# Patient Record
Sex: MALE | Race: BLACK OR AFRICAN AMERICAN | NOT HISPANIC OR LATINO | Employment: UNEMPLOYED | URBAN - METROPOLITAN AREA
[De-identification: names, ages, dates, MRNs, and addresses within clinical notes are randomized per-mention and may not be internally consistent; named-entity substitution may affect disease eponyms.]

---

## 2024-10-19 ENCOUNTER — HOSPITAL ENCOUNTER (INPATIENT)
Facility: HOSPITAL | Age: 24
LOS: 6 days | Discharge: HOME | DRG: 698 | End: 2024-10-25
Attending: EMERGENCY MEDICINE | Admitting: INTERNAL MEDICINE
Payer: MEDICAID

## 2024-10-19 DIAGNOSIS — M32.14 SLE GLOMERULONEPHRITIS SYNDROME, WHO CLASS III: ICD-10-CM

## 2024-10-19 DIAGNOSIS — D64.9 SYMPTOMATIC ANEMIA: ICD-10-CM

## 2024-10-19 DIAGNOSIS — R06.02 SHORTNESS OF BREATH: ICD-10-CM

## 2024-10-19 DIAGNOSIS — N17.9 ACUTE RENAL FAILURE, UNSPECIFIED ACUTE RENAL FAILURE TYPE: ICD-10-CM

## 2024-10-19 DIAGNOSIS — D64.9 ANEMIA, UNSPECIFIED TYPE: Primary | ICD-10-CM

## 2024-10-19 DIAGNOSIS — E87.29 HYPERCHLOREMIC METABOLIC ACIDOSIS: ICD-10-CM

## 2024-10-19 DIAGNOSIS — I31.39 PERICARDIAL EFFUSION: ICD-10-CM

## 2024-10-19 DIAGNOSIS — R60.0 PEDAL EDEMA: ICD-10-CM

## 2024-10-19 DIAGNOSIS — E83.51 HYPOCALCEMIA: ICD-10-CM

## 2024-10-19 DIAGNOSIS — R07.9 CHEST PAIN: ICD-10-CM

## 2024-10-19 DIAGNOSIS — N04.9 NEPHROTIC SYNDROME: ICD-10-CM

## 2024-10-19 PROBLEM — E88.09 HYPOALBUMINEMIA: Status: ACTIVE | Noted: 2024-10-19

## 2024-10-19 PROBLEM — J18.9 PNEUMONIA: Status: ACTIVE | Noted: 2024-10-19

## 2024-10-19 PROBLEM — I10 HYPERTENSION: Status: ACTIVE | Noted: 2024-10-19

## 2024-10-19 PROBLEM — J90 PLEURAL EFFUSION: Status: ACTIVE | Noted: 2024-10-19

## 2024-10-19 LAB
ABO + RH BLD: NORMAL
ALBUMIN SERPL BCP-MCNC: 0.7 G/DL (ref 3.5–5.2)
ALP SERPL-CCNC: 77 U/L (ref 40–150)
ALT SERPL W/O P-5'-P-CCNC: 5 U/L (ref 10–44)
AMPHET+METHAMPHET UR QL: NEGATIVE
ANION GAP SERPL CALC-SCNC: 6 MMOL/L (ref 8–16)
ANISOCYTOSIS BLD QL SMEAR: SLIGHT
APTT PPP: 27.6 SEC (ref 21–32)
AST SERPL-CCNC: 13 U/L (ref 10–40)
BACTERIA #/AREA URNS HPF: ABNORMAL /HPF
BARBITURATES UR QL SCN>200 NG/ML: NEGATIVE
BASOPHILS # BLD AUTO: 0 K/UL (ref 0–0.2)
BASOPHILS NFR BLD: 0 % (ref 0–1.9)
BENZODIAZ UR QL SCN>200 NG/ML: NEGATIVE
BILIRUB SERPL-MCNC: 0.1 MG/DL (ref 0.1–1)
BILIRUB UR QL STRIP: NEGATIVE
BLD GP AB SCN CELLS X3 SERPL QL: NORMAL
BNP SERPL-MCNC: 14 PG/ML (ref 0–99)
BUN SERPL-MCNC: 26 MG/DL (ref 6–20)
BZE UR QL SCN: NEGATIVE
CALCIUM SERPL-MCNC: 6.9 MG/DL (ref 8.7–10.5)
CANNABINOIDS UR QL SCN: NEGATIVE
CHLORIDE SERPL-SCNC: 113 MMOL/L (ref 95–110)
CK SERPL-CCNC: 130 U/L (ref 20–200)
CLARITY UR: CLEAR
CO2 SERPL-SCNC: 17 MMOL/L (ref 23–29)
COLOR UR: YELLOW
CREAT SERPL-MCNC: 2.7 MG/DL (ref 0.5–1.4)
CREAT UR-MCNC: 128.2 MG/DL (ref 23–375)
CREAT UR-MCNC: 128.2 MG/DL (ref 23–375)
CRP SERPL-MCNC: 1.8 MG/L (ref 0–8.2)
DACRYOCYTES BLD QL SMEAR: ABNORMAL
DIFFERENTIAL METHOD BLD: ABNORMAL
EOSINOPHIL # BLD AUTO: 0.1 K/UL (ref 0–0.5)
EOSINOPHIL NFR BLD: 1.2 % (ref 0–8)
ERYTHROCYTE [DISTWIDTH] IN BLOOD BY AUTOMATED COUNT: 14.3 % (ref 11.5–14.5)
ERYTHROCYTE [SEDIMENTATION RATE] IN BLOOD BY WESTERGREN METHOD: 142 MM/HR (ref 0–10)
EST. GFR  (NO RACE VARIABLE): 32 ML/MIN/1.73 M^2
FERRITIN SERPL-MCNC: 1054 NG/ML (ref 20–300)
GLUCOSE SERPL-MCNC: 74 MG/DL (ref 70–110)
GLUCOSE UR QL STRIP: NEGATIVE
HCT VFR BLD AUTO: 12.1 % (ref 40–54)
HCV AB SERPL QL IA: NEGATIVE
HEP C VIRUS HOLD SPECIMEN: NORMAL
HGB BLD-MCNC: 4 G/DL (ref 14–18)
HGB UR QL STRIP: ABNORMAL
HIV 1+2 AB+HIV1 P24 AG SERPL QL IA: NEGATIVE
HYALINE CASTS #/AREA URNS LPF: 30 /LPF
IMM GRANULOCYTES # BLD AUTO: 0.06 K/UL (ref 0–0.04)
IMM GRANULOCYTES NFR BLD AUTO: 1 % (ref 0–0.5)
INR PPP: 0.9 (ref 0.8–1.2)
KETONES UR QL STRIP: NEGATIVE
LDH SERPL L TO P-CCNC: 260 U/L (ref 110–260)
LEUKOCYTE ESTERASE UR QL STRIP: NEGATIVE
LYMPHOCYTES # BLD AUTO: 1.5 K/UL (ref 1–4.8)
LYMPHOCYTES NFR BLD: 25.5 % (ref 18–48)
MCH RBC QN AUTO: 26.8 PG (ref 27–31)
MCHC RBC AUTO-ENTMCNC: 33.1 G/DL (ref 32–36)
MCV RBC AUTO: 81 FL (ref 82–98)
METHADONE UR QL SCN>300 NG/ML: NEGATIVE
MICROSCOPIC COMMENT: ABNORMAL
MONOCYTES # BLD AUTO: 0.4 K/UL (ref 0.3–1)
MONOCYTES NFR BLD: 6.1 % (ref 4–15)
NEUTROPHILS # BLD AUTO: 4 K/UL (ref 1.8–7.7)
NEUTROPHILS NFR BLD: 66.2 % (ref 38–73)
NITRITE UR QL STRIP: NEGATIVE
NRBC BLD-RTO: 0 /100 WBC
OB PNL STL: NEGATIVE
OPIATES UR QL SCN: NEGATIVE
PCP UR QL SCN>25 NG/ML: NEGATIVE
PH UR STRIP: 6 [PH] (ref 5–8)
PLATELET # BLD AUTO: 212 K/UL (ref 150–450)
PLATELET BLD QL SMEAR: ABNORMAL
PMV BLD AUTO: 9 FL (ref 9.2–12.9)
POTASSIUM SERPL-SCNC: 5.2 MMOL/L (ref 3.5–5.1)
PROT SERPL-MCNC: 5.2 G/DL (ref 6–8.4)
PROT UR QL STRIP: ABNORMAL
PROT UR-MCNC: 757 MG/DL (ref 0–15)
PROT/CREAT UR: 5.9 MG/G{CREAT} (ref 0–0.2)
PROTHROMBIN TIME: 10.9 SEC (ref 9–12.5)
RBC # BLD AUTO: 1.49 M/UL (ref 4.6–6.2)
RBC #/AREA URNS HPF: 16 /HPF (ref 0–4)
RETICS/RBC NFR AUTO: 1.6 % (ref 0.4–2)
SODIUM SERPL-SCNC: 136 MMOL/L (ref 136–145)
SP GR UR STRIP: 1.01 (ref 1–1.03)
SPECIMEN OUTDATE: NORMAL
STOMATOCYTES BLD QL SMEAR: PRESENT
T4 FREE SERPL-MCNC: 0.71 NG/DL (ref 0.71–1.51)
TARGETS BLD QL SMEAR: ABNORMAL
TOXICOLOGY INFORMATION: NORMAL
TROPONIN I SERPL DL<=0.01 NG/ML-MCNC: <0.006 NG/ML (ref 0–0.03)
TSH SERPL DL<=0.005 MIU/L-ACNC: 3.1 UIU/ML (ref 0.4–4)
URN SPEC COLLECT METH UR: ABNORMAL
UROBILINOGEN UR STRIP-ACNC: NEGATIVE EU/DL
WBC # BLD AUTO: 6.03 K/UL (ref 3.9–12.7)
WBC #/AREA URNS HPF: 16 /HPF (ref 0–5)

## 2024-10-19 PROCEDURE — 87389 HIV-1 AG W/HIV-1&-2 AB AG IA: CPT | Performed by: EMERGENCY MEDICINE

## 2024-10-19 PROCEDURE — 82550 ASSAY OF CK (CPK): CPT | Performed by: EMERGENCY MEDICINE

## 2024-10-19 PROCEDURE — 83036 HEMOGLOBIN GLYCOSYLATED A1C: CPT | Performed by: EMERGENCY MEDICINE

## 2024-10-19 PROCEDURE — 83615 LACTATE (LD) (LDH) ENZYME: CPT | Performed by: EMERGENCY MEDICINE

## 2024-10-19 PROCEDURE — 93010 ELECTROCARDIOGRAM REPORT: CPT | Mod: ,,, | Performed by: INTERNAL MEDICINE

## 2024-10-19 PROCEDURE — 86038 ANTINUCLEAR ANTIBODIES: CPT | Performed by: EMERGENCY MEDICINE

## 2024-10-19 PROCEDURE — 21400001 HC TELEMETRY ROOM

## 2024-10-19 PROCEDURE — P9016 RBC LEUKOCYTES REDUCED: HCPCS | Performed by: EMERGENCY MEDICINE

## 2024-10-19 PROCEDURE — 85730 THROMBOPLASTIN TIME PARTIAL: CPT | Performed by: EMERGENCY MEDICINE

## 2024-10-19 PROCEDURE — 80307 DRUG TEST PRSMV CHEM ANLYZR: CPT | Performed by: NURSE PRACTITIONER

## 2024-10-19 PROCEDURE — 99223 1ST HOSP IP/OBS HIGH 75: CPT | Mod: ,,, | Performed by: INTERNAL MEDICINE

## 2024-10-19 PROCEDURE — 80074 ACUTE HEPATITIS PANEL: CPT | Performed by: EMERGENCY MEDICINE

## 2024-10-19 PROCEDURE — 86803 HEPATITIS C AB TEST: CPT | Performed by: EMERGENCY MEDICINE

## 2024-10-19 PROCEDURE — 80053 COMPREHEN METABOLIC PANEL: CPT | Performed by: EMERGENCY MEDICINE

## 2024-10-19 PROCEDURE — 93005 ELECTROCARDIOGRAM TRACING: CPT

## 2024-10-19 PROCEDURE — 96375 TX/PRO/DX INJ NEW DRUG ADDON: CPT

## 2024-10-19 PROCEDURE — 87040 BLOOD CULTURE FOR BACTERIA: CPT | Performed by: EMERGENCY MEDICINE

## 2024-10-19 PROCEDURE — 36415 COLL VENOUS BLD VENIPUNCTURE: CPT | Performed by: EMERGENCY MEDICINE

## 2024-10-19 PROCEDURE — 86901 BLOOD TYPING SEROLOGIC RH(D): CPT | Performed by: EMERGENCY MEDICINE

## 2024-10-19 PROCEDURE — 82728 ASSAY OF FERRITIN: CPT | Performed by: EMERGENCY MEDICINE

## 2024-10-19 PROCEDURE — 84466 ASSAY OF TRANSFERRIN: CPT | Performed by: EMERGENCY MEDICINE

## 2024-10-19 PROCEDURE — 84156 ASSAY OF PROTEIN URINE: CPT | Performed by: EMERGENCY MEDICINE

## 2024-10-19 PROCEDURE — 25000003 PHARM REV CODE 250: Performed by: NURSE PRACTITIONER

## 2024-10-19 PROCEDURE — 82607 VITAMIN B-12: CPT | Performed by: EMERGENCY MEDICINE

## 2024-10-19 PROCEDURE — 83540 ASSAY OF IRON: CPT | Performed by: EMERGENCY MEDICINE

## 2024-10-19 PROCEDURE — 96365 THER/PROPH/DIAG IV INF INIT: CPT

## 2024-10-19 PROCEDURE — 86900 BLOOD TYPING SEROLOGIC ABO: CPT | Performed by: EMERGENCY MEDICINE

## 2024-10-19 PROCEDURE — 99285 EMERGENCY DEPT VISIT HI MDM: CPT | Mod: 25

## 2024-10-19 PROCEDURE — 82272 OCCULT BLD FECES 1-3 TESTS: CPT | Performed by: EMERGENCY MEDICINE

## 2024-10-19 PROCEDURE — 85025 COMPLETE CBC W/AUTO DIFF WBC: CPT | Performed by: EMERGENCY MEDICINE

## 2024-10-19 PROCEDURE — 85651 RBC SED RATE NONAUTOMATED: CPT | Performed by: EMERGENCY MEDICINE

## 2024-10-19 PROCEDURE — 93010 ELECTROCARDIOGRAM REPORT: CPT | Mod: 59,,, | Performed by: INTERNAL MEDICINE

## 2024-10-19 PROCEDURE — 25000003 PHARM REV CODE 250: Mod: JZ,JG | Performed by: EMERGENCY MEDICINE

## 2024-10-19 PROCEDURE — 86225 DNA ANTIBODY NATIVE: CPT | Performed by: EMERGENCY MEDICINE

## 2024-10-19 PROCEDURE — 84484 ASSAY OF TROPONIN QUANT: CPT | Performed by: EMERGENCY MEDICINE

## 2024-10-19 PROCEDURE — 85610 PROTHROMBIN TIME: CPT | Performed by: EMERGENCY MEDICINE

## 2024-10-19 PROCEDURE — 86140 C-REACTIVE PROTEIN: CPT | Performed by: EMERGENCY MEDICINE

## 2024-10-19 PROCEDURE — 36430 TRANSFUSION BLD/BLD COMPNT: CPT

## 2024-10-19 PROCEDURE — 84443 ASSAY THYROID STIM HORMONE: CPT | Performed by: EMERGENCY MEDICINE

## 2024-10-19 PROCEDURE — 86920 COMPATIBILITY TEST SPIN: CPT | Performed by: EMERGENCY MEDICINE

## 2024-10-19 PROCEDURE — 84439 ASSAY OF FREE THYROXINE: CPT | Performed by: EMERGENCY MEDICINE

## 2024-10-19 PROCEDURE — 63600175 PHARM REV CODE 636 W HCPCS: Performed by: NURSE PRACTITIONER

## 2024-10-19 PROCEDURE — 63600175 PHARM REV CODE 636 W HCPCS: Performed by: EMERGENCY MEDICINE

## 2024-10-19 PROCEDURE — 86850 RBC ANTIBODY SCREEN: CPT | Performed by: EMERGENCY MEDICINE

## 2024-10-19 PROCEDURE — 81000 URINALYSIS NONAUTO W/SCOPE: CPT | Mod: 59 | Performed by: INTERNAL MEDICINE

## 2024-10-19 PROCEDURE — 85045 AUTOMATED RETICULOCYTE COUNT: CPT | Performed by: EMERGENCY MEDICINE

## 2024-10-19 PROCEDURE — 82746 ASSAY OF FOLIC ACID SERUM: CPT | Performed by: EMERGENCY MEDICINE

## 2024-10-19 PROCEDURE — 86039 ANTINUCLEAR ANTIBODIES (ANA): CPT | Performed by: EMERGENCY MEDICINE

## 2024-10-19 PROCEDURE — 86235 NUCLEAR ANTIGEN ANTIBODY: CPT | Mod: 59 | Performed by: EMERGENCY MEDICINE

## 2024-10-19 PROCEDURE — 83880 ASSAY OF NATRIURETIC PEPTIDE: CPT | Performed by: EMERGENCY MEDICINE

## 2024-10-19 RX ORDER — IBUPROFEN 200 MG
16 TABLET ORAL
Status: DISCONTINUED | OUTPATIENT
Start: 2024-10-19 | End: 2024-10-25 | Stop reason: HOSPADM

## 2024-10-19 RX ORDER — AMOXICILLIN 250 MG
1 CAPSULE ORAL 2 TIMES DAILY
Status: DISCONTINUED | OUTPATIENT
Start: 2024-10-19 | End: 2024-10-25 | Stop reason: HOSPADM

## 2024-10-19 RX ORDER — SODIUM BICARBONATE 650 MG/1
650 TABLET ORAL 3 TIMES DAILY
Status: DISCONTINUED | OUTPATIENT
Start: 2024-10-19 | End: 2024-10-21

## 2024-10-19 RX ORDER — FUROSEMIDE 10 MG/ML
60 INJECTION INTRAMUSCULAR; INTRAVENOUS
Status: COMPLETED | OUTPATIENT
Start: 2024-10-19 | End: 2024-10-19

## 2024-10-19 RX ORDER — POLYETHYLENE GLYCOL 3350 17 G/17G
17 POWDER, FOR SOLUTION ORAL 2 TIMES DAILY PRN
Status: DISCONTINUED | OUTPATIENT
Start: 2024-10-19 | End: 2024-10-25 | Stop reason: HOSPADM

## 2024-10-19 RX ORDER — SODIUM CHLORIDE 0.9 % (FLUSH) 0.9 %
10 SYRINGE (ML) INJECTION EVERY 8 HOURS PRN
Status: DISCONTINUED | OUTPATIENT
Start: 2024-10-19 | End: 2024-10-23

## 2024-10-19 RX ORDER — TALC
6 POWDER (GRAM) TOPICAL NIGHTLY PRN
Status: DISCONTINUED | OUTPATIENT
Start: 2024-10-19 | End: 2024-10-25 | Stop reason: HOSPADM

## 2024-10-19 RX ORDER — ONDANSETRON HYDROCHLORIDE 2 MG/ML
4 INJECTION, SOLUTION INTRAVENOUS EVERY 6 HOURS PRN
Status: DISCONTINUED | OUTPATIENT
Start: 2024-10-19 | End: 2024-10-25 | Stop reason: HOSPADM

## 2024-10-19 RX ORDER — ACETAMINOPHEN 325 MG/1
650 TABLET ORAL EVERY 4 HOURS PRN
Status: DISCONTINUED | OUTPATIENT
Start: 2024-10-19 | End: 2024-10-25 | Stop reason: HOSPADM

## 2024-10-19 RX ORDER — HYDROCODONE BITARTRATE AND ACETAMINOPHEN 500; 5 MG/1; MG/1
TABLET ORAL
Status: DISCONTINUED | OUTPATIENT
Start: 2024-10-19 | End: 2024-10-25 | Stop reason: HOSPADM

## 2024-10-19 RX ORDER — CEFTRIAXONE 1 G/1
1 INJECTION, POWDER, FOR SOLUTION INTRAMUSCULAR; INTRAVENOUS
Status: COMPLETED | OUTPATIENT
Start: 2024-10-19 | End: 2024-10-19

## 2024-10-19 RX ORDER — CEFTRIAXONE 2 G/1
2 INJECTION, POWDER, FOR SOLUTION INTRAMUSCULAR; INTRAVENOUS
Status: DISCONTINUED | OUTPATIENT
Start: 2024-10-20 | End: 2024-10-23

## 2024-10-19 RX ORDER — ALUMINUM HYDROXIDE, MAGNESIUM HYDROXIDE, AND SIMETHICONE 1200; 120; 1200 MG/30ML; MG/30ML; MG/30ML
30 SUSPENSION ORAL 4 TIMES DAILY PRN
Status: DISCONTINUED | OUTPATIENT
Start: 2024-10-19 | End: 2024-10-20

## 2024-10-19 RX ORDER — IBUPROFEN 200 MG
24 TABLET ORAL
Status: DISCONTINUED | OUTPATIENT
Start: 2024-10-19 | End: 2024-10-25 | Stop reason: HOSPADM

## 2024-10-19 RX ORDER — SIMETHICONE 80 MG
1 TABLET,CHEWABLE ORAL 4 TIMES DAILY PRN
Status: DISCONTINUED | OUTPATIENT
Start: 2024-10-19 | End: 2024-10-25 | Stop reason: HOSPADM

## 2024-10-19 RX ORDER — GLUCAGON 1 MG
1 KIT INJECTION
Status: DISCONTINUED | OUTPATIENT
Start: 2024-10-19 | End: 2024-10-25 | Stop reason: HOSPADM

## 2024-10-19 RX ORDER — HYDRALAZINE HYDROCHLORIDE 20 MG/ML
10 INJECTION INTRAMUSCULAR; INTRAVENOUS EVERY 4 HOURS PRN
Status: DISCONTINUED | OUTPATIENT
Start: 2024-10-19 | End: 2024-10-25 | Stop reason: HOSPADM

## 2024-10-19 RX ORDER — PROCHLORPERAZINE EDISYLATE 5 MG/ML
5 INJECTION INTRAMUSCULAR; INTRAVENOUS EVERY 6 HOURS PRN
Status: DISCONTINUED | OUTPATIENT
Start: 2024-10-19 | End: 2024-10-23

## 2024-10-19 RX ORDER — HEPARIN SODIUM 5000 [USP'U]/ML
5000 INJECTION, SOLUTION INTRAVENOUS; SUBCUTANEOUS EVERY 8 HOURS
Status: COMPLETED | OUTPATIENT
Start: 2024-10-19 | End: 2024-10-20

## 2024-10-19 RX ORDER — NALOXONE HCL 0.4 MG/ML
0.02 VIAL (ML) INJECTION
Status: DISCONTINUED | OUTPATIENT
Start: 2024-10-19 | End: 2024-10-25 | Stop reason: HOSPADM

## 2024-10-19 RX ORDER — CALCIUM GLUCONATE 20 MG/ML
1 INJECTION, SOLUTION INTRAVENOUS
Status: COMPLETED | OUTPATIENT
Start: 2024-10-19 | End: 2024-10-19

## 2024-10-19 RX ORDER — FUROSEMIDE 10 MG/ML
40 INJECTION INTRAMUSCULAR; INTRAVENOUS EVERY 12 HOURS
Status: DISCONTINUED | OUTPATIENT
Start: 2024-10-19 | End: 2024-10-23

## 2024-10-19 RX ORDER — IPRATROPIUM BROMIDE AND ALBUTEROL SULFATE 2.5; .5 MG/3ML; MG/3ML
3 SOLUTION RESPIRATORY (INHALATION) EVERY 6 HOURS PRN
Status: DISCONTINUED | OUTPATIENT
Start: 2024-10-19 | End: 2024-10-25 | Stop reason: HOSPADM

## 2024-10-19 RX ORDER — HYDROCODONE BITARTRATE AND ACETAMINOPHEN 5; 325 MG/1; MG/1
1 TABLET ORAL EVERY 6 HOURS PRN
Status: DISCONTINUED | OUTPATIENT
Start: 2024-10-19 | End: 2024-10-25 | Stop reason: HOSPADM

## 2024-10-19 RX ADMIN — NITROGLYCERIN 1 INCH: 20 OINTMENT TOPICAL at 01:10

## 2024-10-19 RX ADMIN — FUROSEMIDE 40 MG: 10 INJECTION, SOLUTION INTRAMUSCULAR; INTRAVENOUS at 09:10

## 2024-10-19 RX ADMIN — CEFTRIAXONE 1 G: 1 INJECTION, POWDER, FOR SOLUTION INTRAMUSCULAR; INTRAVENOUS at 02:10

## 2024-10-19 RX ADMIN — CEFTRIAXONE 1 G: 1 INJECTION, POWDER, FOR SOLUTION INTRAMUSCULAR; INTRAVENOUS at 03:10

## 2024-10-19 RX ADMIN — HEPARIN SODIUM 5000 UNITS: 5000 INJECTION INTRAVENOUS; SUBCUTANEOUS at 09:10

## 2024-10-19 RX ADMIN — SODIUM BICARBONATE 650 MG TABLET 650 MG: at 09:10

## 2024-10-19 RX ADMIN — SODIUM BICARBONATE 650 MG TABLET 650 MG: at 05:10

## 2024-10-19 RX ADMIN — FUROSEMIDE 60 MG: 10 INJECTION, SOLUTION INTRAMUSCULAR; INTRAVENOUS at 01:10

## 2024-10-19 RX ADMIN — CALCIUM GLUCONATE 1 G: 20 INJECTION, SOLUTION INTRAVENOUS at 02:10

## 2024-10-19 RX ADMIN — SENNOSIDES AND DOCUSATE SODIUM 1 TABLET: 50; 8.6 TABLET ORAL at 09:10

## 2024-10-20 PROBLEM — E87.5 HYPERKALEMIA: Status: ACTIVE | Noted: 2024-10-20

## 2024-10-20 PROBLEM — N04.9 NEPHROTIC SYNDROME: Status: ACTIVE | Noted: 2024-10-19

## 2024-10-20 PROBLEM — E83.42 HYPOMAGNESEMIA: Status: ACTIVE | Noted: 2024-10-20

## 2024-10-20 PROBLEM — E87.29 HYPERCHLOREMIC METABOLIC ACIDOSIS: Status: ACTIVE | Noted: 2024-10-20

## 2024-10-20 LAB
ALBUMIN SERPL BCP-MCNC: 0.8 G/DL (ref 3.5–5.2)
ANION GAP SERPL CALC-SCNC: 5 MMOL/L (ref 8–16)
ANISOCYTOSIS BLD QL SMEAR: SLIGHT
ASCENDING AORTA: 2.41 CM
AV INDEX (PROSTH): 0.75
AV MEAN GRADIENT: 4.3 MMHG
AV PEAK GRADIENT: 7.8 MMHG
AV VALVE AREA BY VELOCITY RATIO: 2.5 CM²
AV VALVE AREA: 2.6 CM²
AV VELOCITY RATIO: 0.71
BASOPHILS # BLD AUTO: 0.02 K/UL (ref 0–0.2)
BASOPHILS NFR BLD: 0.5 % (ref 0–1.9)
BLD PROD TYP BPU: NORMAL
BLOOD UNIT EXPIRATION DATE: NORMAL
BLOOD UNIT TYPE CODE: 5100
BLOOD UNIT TYPE: NORMAL
BSA FOR ECHO PROCEDURE: 1.97 M2
BUN SERPL-MCNC: 24 MG/DL (ref 6–20)
BURR CELLS BLD QL SMEAR: ABNORMAL
C3 SERPL-MCNC: 33 MG/DL (ref 50–180)
C4 SERPL-MCNC: 6 MG/DL (ref 11–44)
CALCIUM SERPL-MCNC: 7.1 MG/DL (ref 8.7–10.5)
CHLORIDE SERPL-SCNC: 110 MMOL/L (ref 95–110)
CHOLEST SERPL-MCNC: 218 MG/DL (ref 120–199)
CHOLEST/HDLC SERPL: 7 {RATIO} (ref 2–5)
CO2 SERPL-SCNC: 20 MMOL/L (ref 23–29)
CODING SYSTEM: NORMAL
CREAT SERPL-MCNC: 2.4 MG/DL (ref 0.5–1.4)
CROSSMATCH INTERPRETATION: NORMAL
CV ECHO LV RWT: 0.44 CM
DACRYOCYTES BLD QL SMEAR: ABNORMAL
DIFFERENTIAL METHOD BLD: ABNORMAL
DISPENSE STATUS: NORMAL
DOP CALC AO PEAK VEL: 1.4 M/S
DOP CALC AO VTI: 24.8 CM
DOP CALC LVOT AREA: 3.5 CM2
DOP CALC LVOT DIAMETER: 2.1 CM
DOP CALC LVOT PEAK VEL: 1 M/S
DOP CALC LVOT STROKE VOLUME: 64.4 CM3
DOP CALCLVOT PEAK VEL VTI: 18.6 CM
E WAVE DECELERATION TIME: 185.55 MSEC
E/A RATIO: 1.81
E/E' RATIO: 7.57 M/S
ECHO LV POSTERIOR WALL: 1.1 CM (ref 0.6–1.1)
EJECTION FRACTION: 60 %
EOSINOPHIL # BLD AUTO: 0.1 K/UL (ref 0–0.5)
EOSINOPHIL NFR BLD: 1.4 % (ref 0–8)
ERYTHROCYTE [DISTWIDTH] IN BLOOD BY AUTOMATED COUNT: 14.6 % (ref 11.5–14.5)
EST. GFR  (NO RACE VARIABLE): 37 ML/MIN/1.73 M^2
ESTIMATED AVG GLUCOSE: 68 MG/DL (ref 68–131)
FOLATE SERPL-MCNC: 4.1 NG/ML (ref 4–24)
FRACTIONAL SHORTENING: 28 % (ref 28–44)
GLUCOSE SERPL-MCNC: 62 MG/DL (ref 70–110)
HAV IGM SERPL QL IA: NORMAL
HBA1C MFR BLD: 4 % (ref 4–5.6)
HBV CORE IGM SERPL QL IA: NORMAL
HBV SURFACE AG SERPL QL IA: NORMAL
HCT VFR BLD AUTO: 35.9 % (ref 40–54)
HCV AB SERPL QL IA: NORMAL
HDLC SERPL-MCNC: 31 MG/DL (ref 40–75)
HDLC SERPL: 14.2 % (ref 20–50)
HGB BLD-MCNC: 12 G/DL (ref 14–18)
IMM GRANULOCYTES # BLD AUTO: 0.03 K/UL (ref 0–0.04)
IMM GRANULOCYTES NFR BLD AUTO: 0.7 % (ref 0–0.5)
INTERVENTRICULAR SEPTUM: 0.8 CM (ref 0.6–1.1)
IRON SERPL-MCNC: 42 UG/DL (ref 45–160)
IRON SERPL-MCNC: 43 UG/DL (ref 45–160)
IVC DIAMETER: 1.47 CM
IVRT: 64.7 MSEC
LA MAJOR: 5.91 CM
LA MINOR: 5.89 CM
LA WIDTH: 3.4 CM
LDLC SERPL CALC-MCNC: 148.2 MG/DL (ref 63–159)
LEFT ATRIUM AREA SYSTOLIC (APICAL 2 CHAMBER): 16.29 CM2
LEFT ATRIUM AREA SYSTOLIC (APICAL 4 CHAMBER): 16.07 CM2
LEFT ATRIUM SIZE: 3.89 CM
LEFT ATRIUM VOLUME INDEX MOD: 19.7 ML/M2
LEFT ATRIUM VOLUME INDEX: 34 ML/M2
LEFT ATRIUM VOLUME MOD: 38.44 ML
LEFT ATRIUM VOLUME: 66.33 CM3
LEFT INTERNAL DIMENSION IN SYSTOLE: 3.6 CM (ref 2.1–4)
LEFT VENTRICLE DIASTOLIC VOLUME INDEX: 61.73 ML/M2
LEFT VENTRICLE DIASTOLIC VOLUME: 120.38 ML
LEFT VENTRICLE END SYSTOLIC VOLUME APICAL 2 CHAMBER: 37.76 ML
LEFT VENTRICLE END SYSTOLIC VOLUME APICAL 4 CHAMBER: 37.92 ML
LEFT VENTRICLE MASS INDEX: 87.1 G/M2
LEFT VENTRICLE SYSTOLIC VOLUME INDEX: 28.6 ML/M2
LEFT VENTRICLE SYSTOLIC VOLUME: 55.83 ML
LEFT VENTRICULAR INTERNAL DIMENSION IN DIASTOLE: 5 CM (ref 3.5–6)
LEFT VENTRICULAR MASS: 169.9 G
LV LATERAL E/E' RATIO: 6.69 M/S
LV SEPTAL E/E' RATIO: 8.7 M/S
LVED V (TEICH): 120.38 ML
LVES V (TEICH): 55.83 ML
LVOT MG: 2.22 MMHG
LVOT MV: 0.71 CM/S
LYMPHOCYTES # BLD AUTO: 1.3 K/UL (ref 1–4.8)
LYMPHOCYTES NFR BLD: 28.4 % (ref 18–48)
MAGNESIUM SERPL-MCNC: 1.5 MG/DL (ref 1.6–2.6)
MCH RBC QN AUTO: 28.2 PG (ref 27–31)
MCHC RBC AUTO-ENTMCNC: 33.4 G/DL (ref 32–36)
MCV RBC AUTO: 85 FL (ref 82–98)
MONOCYTES # BLD AUTO: 0.3 K/UL (ref 0.3–1)
MONOCYTES NFR BLD: 5.9 % (ref 4–15)
MV PEAK A VEL: 0.48 M/S
MV PEAK E VEL: 0.87 M/S
NEUTROPHILS # BLD AUTO: 2.8 K/UL (ref 1.8–7.7)
NEUTROPHILS NFR BLD: 63.1 % (ref 38–73)
NONHDLC SERPL-MCNC: 187 MG/DL
NRBC BLD-RTO: 0 /100 WBC
NUM UNITS TRANS PACKED RBC: NORMAL
OHS QRS DURATION: 86 MS
OHS QRS DURATION: 92 MS
OHS QTC CALCULATION: 400 MS
OHS QTC CALCULATION: 408 MS
PHOSPHATE SERPL-MCNC: 4.7 MG/DL (ref 2.7–4.5)
PHOSPHATE SERPL-MCNC: 4.7 MG/DL (ref 2.7–4.5)
PISA MRMAX VEL: 5.44 M/S
PISA TR MAX VEL: 2.27 M/S
PLATELET # BLD AUTO: 195 K/UL (ref 150–450)
PLATELET BLD QL SMEAR: ABNORMAL
PMV BLD AUTO: 9.1 FL (ref 9.2–12.9)
POIKILOCYTOSIS BLD QL SMEAR: SLIGHT
POTASSIUM SERPL-SCNC: 5.4 MMOL/L (ref 3.5–5.1)
PTH-INTACT SERPL-MCNC: 216.1 PG/ML (ref 9–77)
PV MV: 0.66 M/S
PV PEAK GRADIENT: 3 MMHG
PV PEAK VELOCITY: 0.88 M/S
RA MAJOR: 5.18 CM
RA PRESSURE ESTIMATED: 3 MMHG
RA WIDTH: 4.5 CM
RBC # BLD AUTO: 4.25 M/UL (ref 4.6–6.2)
RIGHT VENTRICULAR END-DIASTOLIC DIMENSION: 3.8 CM
RV TB RVSP: 5 MMHG
RV TISSUE DOPPLER FREE WALL SYSTOLIC VELOCITY 1 (APICAL 4 CHAMBER VIEW): 13.49 CM/S
SATURATED IRON: 38 % (ref 20–50)
SATURATED IRON: 43 % (ref 20–50)
SODIUM SERPL-SCNC: 135 MMOL/L (ref 136–145)
STJ: 2.14 CM
TARGETS BLD QL SMEAR: ABNORMAL
TDI LATERAL: 0.13 M/S
TDI SEPTAL: 0.1 M/S
TDI: 0.12 M/S
TOTAL IRON BINDING CAPACITY: 112 UG/DL (ref 250–450)
TOTAL IRON BINDING CAPACITY: 98 UG/DL (ref 250–450)
TR MAX PG: 21 MMHG
TRANSFERRIN SERPL-MCNC: 66 MG/DL (ref 200–375)
TRANSFERRIN SERPL-MCNC: 76 MG/DL (ref 200–375)
TRICUSPID ANNULAR PLANE SYSTOLIC EXCURSION: 1.41 CM
TRIGL SERPL-MCNC: 194 MG/DL (ref 30–150)
TV REST PULMONARY ARTERY PRESSURE: 24 MMHG
URATE SERPL-MCNC: 6.8 MG/DL (ref 3.4–7)
VIT B12 SERPL-MCNC: 311 PG/ML (ref 210–950)
WBC # BLD AUTO: 4.44 K/UL (ref 3.9–12.7)
Z-SCORE OF LEFT VENTRICULAR DIMENSION IN END DIASTOLE: -1.06
Z-SCORE OF LEFT VENTRICULAR DIMENSION IN END SYSTOLE: 0.41

## 2024-10-20 PROCEDURE — 63600175 PHARM REV CODE 636 W HCPCS: Performed by: INTERNAL MEDICINE

## 2024-10-20 PROCEDURE — 84466 ASSAY OF TRANSFERRIN: CPT | Performed by: INTERNAL MEDICINE

## 2024-10-20 PROCEDURE — 83516 IMMUNOASSAY NONANTIBODY: CPT | Performed by: INTERNAL MEDICINE

## 2024-10-20 PROCEDURE — 36415 COLL VENOUS BLD VENIPUNCTURE: CPT | Performed by: INTERNAL MEDICINE

## 2024-10-20 PROCEDURE — 86334 IMMUNOFIX E-PHORESIS SERUM: CPT | Mod: 26,,, | Performed by: PATHOLOGY

## 2024-10-20 PROCEDURE — 80061 LIPID PANEL: CPT | Performed by: INTERNAL MEDICINE

## 2024-10-20 PROCEDURE — 86160 COMPLEMENT ANTIGEN: CPT | Performed by: INTERNAL MEDICINE

## 2024-10-20 PROCEDURE — 63600175 PHARM REV CODE 636 W HCPCS: Performed by: NURSE PRACTITIONER

## 2024-10-20 PROCEDURE — 84165 PROTEIN E-PHORESIS SERUM: CPT | Performed by: INTERNAL MEDICINE

## 2024-10-20 PROCEDURE — 21400001 HC TELEMETRY ROOM

## 2024-10-20 PROCEDURE — 99233 SBSQ HOSP IP/OBS HIGH 50: CPT | Mod: ,,, | Performed by: INTERNAL MEDICINE

## 2024-10-20 PROCEDURE — 83735 ASSAY OF MAGNESIUM: CPT | Performed by: NURSE PRACTITIONER

## 2024-10-20 PROCEDURE — 85025 COMPLETE CBC W/AUTO DIFF WBC: CPT | Performed by: NURSE PRACTITIONER

## 2024-10-20 PROCEDURE — 82306 VITAMIN D 25 HYDROXY: CPT | Performed by: INTERNAL MEDICINE

## 2024-10-20 PROCEDURE — 86160 COMPLEMENT ANTIGEN: CPT | Mod: 59 | Performed by: INTERNAL MEDICINE

## 2024-10-20 PROCEDURE — 84550 ASSAY OF BLOOD/URIC ACID: CPT | Performed by: INTERNAL MEDICINE

## 2024-10-20 PROCEDURE — 83540 ASSAY OF IRON: CPT | Performed by: INTERNAL MEDICINE

## 2024-10-20 PROCEDURE — P9016 RBC LEUKOCYTES REDUCED: HCPCS | Performed by: EMERGENCY MEDICINE

## 2024-10-20 PROCEDURE — 83970 ASSAY OF PARATHORMONE: CPT | Performed by: INTERNAL MEDICINE

## 2024-10-20 PROCEDURE — 84165 PROTEIN E-PHORESIS SERUM: CPT | Mod: 26,,, | Performed by: PATHOLOGY

## 2024-10-20 PROCEDURE — 86036 ANCA SCREEN EACH ANTIBODY: CPT | Performed by: INTERNAL MEDICINE

## 2024-10-20 PROCEDURE — 36430 TRANSFUSION BLD/BLD COMPNT: CPT

## 2024-10-20 PROCEDURE — 25000003 PHARM REV CODE 250: Performed by: INTERNAL MEDICINE

## 2024-10-20 PROCEDURE — 80069 RENAL FUNCTION PANEL: CPT | Performed by: NURSE PRACTITIONER

## 2024-10-20 PROCEDURE — 25000003 PHARM REV CODE 250: Performed by: NURSE PRACTITIONER

## 2024-10-20 PROCEDURE — 86334 IMMUNOFIX E-PHORESIS SERUM: CPT | Performed by: INTERNAL MEDICINE

## 2024-10-20 RX ORDER — LANOLIN ALCOHOL/MO/W.PET/CERES
400 CREAM (GRAM) TOPICAL 2 TIMES DAILY
Status: COMPLETED | OUTPATIENT
Start: 2024-10-20 | End: 2024-10-21

## 2024-10-20 RX ORDER — FAMOTIDINE 20 MG/1
20 TABLET, FILM COATED ORAL DAILY
Status: DISCONTINUED | OUTPATIENT
Start: 2024-10-20 | End: 2024-10-22

## 2024-10-20 RX ADMIN — FAMOTIDINE 20 MG: 20 TABLET ORAL at 03:10

## 2024-10-20 RX ADMIN — Medication 400 MG: at 09:10

## 2024-10-20 RX ADMIN — SODIUM ZIRCONIUM CYCLOSILICATE 10 G: 5 POWDER, FOR SUSPENSION ORAL at 02:10

## 2024-10-20 RX ADMIN — SODIUM BICARBONATE 650 MG TABLET 650 MG: at 09:10

## 2024-10-20 RX ADMIN — SENNOSIDES AND DOCUSATE SODIUM 1 TABLET: 50; 8.6 TABLET ORAL at 08:10

## 2024-10-20 RX ADMIN — HEPARIN SODIUM 5000 UNITS: 5000 INJECTION INTRAVENOUS; SUBCUTANEOUS at 05:10

## 2024-10-20 RX ADMIN — SENNOSIDES AND DOCUSATE SODIUM 1 TABLET: 50; 8.6 TABLET ORAL at 09:10

## 2024-10-20 RX ADMIN — FUROSEMIDE 40 MG: 10 INJECTION, SOLUTION INTRAMUSCULAR; INTRAVENOUS at 09:10

## 2024-10-20 RX ADMIN — HEPARIN SODIUM 5000 UNITS: 5000 INJECTION INTRAVENOUS; SUBCUTANEOUS at 02:10

## 2024-10-20 RX ADMIN — SODIUM BICARBONATE 650 MG TABLET 650 MG: at 02:10

## 2024-10-20 RX ADMIN — CEFTRIAXONE 2 G: 2 INJECTION, POWDER, FOR SOLUTION INTRAMUSCULAR; INTRAVENOUS at 03:10

## 2024-10-20 RX ADMIN — SODIUM BICARBONATE 650 MG TABLET 650 MG: at 08:10

## 2024-10-20 RX ADMIN — HEPARIN SODIUM 5000 UNITS: 5000 INJECTION INTRAVENOUS; SUBCUTANEOUS at 09:10

## 2024-10-21 LAB
25(OH)D3+25(OH)D2 SERPL-MCNC: 6 NG/ML (ref 30–96)
ALBUMIN SERPL BCP-MCNC: 0.8 G/DL (ref 3.5–5.2)
ALBUMIN SERPL ELPH-MCNC: 1.31 G/DL (ref 3.35–5.55)
ALP SERPL-CCNC: 77 U/L (ref 40–150)
ALPHA1 GLOB SERPL ELPH-MCNC: 0.23 G/DL (ref 0.17–0.41)
ALPHA2 GLOB SERPL ELPH-MCNC: 0.87 G/DL (ref 0.43–0.99)
ALT SERPL W/O P-5'-P-CCNC: <5 U/L (ref 10–44)
ANION GAP SERPL CALC-SCNC: 6 MMOL/L (ref 8–16)
ANISOCYTOSIS BLD QL SMEAR: SLIGHT
AST SERPL-CCNC: 13 U/L (ref 10–40)
B-GLOBULIN SERPL ELPH-MCNC: 0.63 G/DL (ref 0.5–1.1)
BACTERIA #/AREA URNS HPF: ABNORMAL /HPF
BASOPHILS # BLD AUTO: 0.02 K/UL (ref 0–0.2)
BASOPHILS NFR BLD: 0.5 % (ref 0–1.9)
BILIRUB SERPL-MCNC: 0.1 MG/DL (ref 0.1–1)
BILIRUB UR QL STRIP: NEGATIVE
BUN SERPL-MCNC: 25 MG/DL (ref 6–20)
CALCIUM SERPL-MCNC: 7.2 MG/DL (ref 8.7–10.5)
CHLORIDE SERPL-SCNC: 112 MMOL/L (ref 95–110)
CLARITY UR: CLEAR
CO2 SERPL-SCNC: 17 MMOL/L (ref 23–29)
COLOR UR: YELLOW
CREAT SERPL-MCNC: 2.5 MG/DL (ref 0.5–1.4)
DACRYOCYTES BLD QL SMEAR: ABNORMAL
DIFFERENTIAL METHOD BLD: ABNORMAL
EOSINOPHIL # BLD AUTO: 0.1 K/UL (ref 0–0.5)
EOSINOPHIL NFR BLD: 1.4 % (ref 0–8)
ERYTHROCYTE [DISTWIDTH] IN BLOOD BY AUTOMATED COUNT: 14.6 % (ref 11.5–14.5)
EST. GFR  (NO RACE VARIABLE): 35 ML/MIN/1.73 M^2
GAMMA GLOB SERPL ELPH-MCNC: 2.15 G/DL (ref 0.67–1.58)
GLUCOSE SERPL-MCNC: 67 MG/DL (ref 70–110)
GLUCOSE UR QL STRIP: NEGATIVE
HCT VFR BLD AUTO: 31.5 % (ref 40–54)
HGB BLD-MCNC: 10.9 G/DL (ref 14–18)
HGB UR QL STRIP: ABNORMAL
HYALINE CASTS #/AREA URNS LPF: 14 /LPF
HYPOCHROMIA BLD QL SMEAR: ABNORMAL
IMM GRANULOCYTES # BLD AUTO: 0.04 K/UL (ref 0–0.04)
IMM GRANULOCYTES NFR BLD AUTO: 0.9 % (ref 0–0.5)
INTERPRETATION SERPL IFE-IMP: NORMAL
KETONES UR QL STRIP: NEGATIVE
LEUKOCYTE ESTERASE UR QL STRIP: NEGATIVE
LYMPHOCYTES # BLD AUTO: 1.1 K/UL (ref 1–4.8)
LYMPHOCYTES NFR BLD: 24.4 % (ref 18–48)
MAGNESIUM SERPL-MCNC: 1.4 MG/DL (ref 1.6–2.6)
MCH RBC QN AUTO: 28.7 PG (ref 27–31)
MCHC RBC AUTO-ENTMCNC: 34.6 G/DL (ref 32–36)
MCV RBC AUTO: 83 FL (ref 82–98)
MICROSCOPIC COMMENT: ABNORMAL
MONOCYTES # BLD AUTO: 0.3 K/UL (ref 0.3–1)
MONOCYTES NFR BLD: 6 % (ref 4–15)
NEUTROPHILS # BLD AUTO: 2.9 K/UL (ref 1.8–7.7)
NEUTROPHILS NFR BLD: 66.8 % (ref 38–73)
NITRITE UR QL STRIP: NEGATIVE
NRBC BLD-RTO: 0 /100 WBC
OVALOCYTES BLD QL SMEAR: ABNORMAL
PH UR STRIP: 7 [PH] (ref 5–8)
PLATELET # BLD AUTO: 165 K/UL (ref 150–450)
PLATELET BLD QL SMEAR: ABNORMAL
PMV BLD AUTO: 9.3 FL (ref 9.2–12.9)
POIKILOCYTOSIS BLD QL SMEAR: SLIGHT
POTASSIUM SERPL-SCNC: 5.4 MMOL/L (ref 3.5–5.1)
PROT SERPL-MCNC: 5 G/DL (ref 6–8.4)
PROT SERPL-MCNC: 5.2 G/DL (ref 6–8.4)
PROT UR QL STRIP: ABNORMAL
RBC # BLD AUTO: 3.8 M/UL (ref 4.6–6.2)
RBC #/AREA URNS HPF: 41 /HPF (ref 0–4)
SODIUM SERPL-SCNC: 135 MMOL/L (ref 136–145)
SP GR UR STRIP: 1.02 (ref 1–1.03)
TARGETS BLD QL SMEAR: ABNORMAL
UNIDENT CRYS URNS QL MICRO: ABNORMAL
URN SPEC COLLECT METH UR: ABNORMAL
UROBILINOGEN UR STRIP-ACNC: NEGATIVE EU/DL
WBC # BLD AUTO: 4.35 K/UL (ref 3.9–12.7)
WBC #/AREA URNS HPF: 12 /HPF (ref 0–5)
WBC CLUMPS URNS QL MICRO: ABNORMAL

## 2024-10-21 PROCEDURE — 85025 COMPLETE CBC W/AUTO DIFF WBC: CPT | Performed by: NURSE PRACTITIONER

## 2024-10-21 PROCEDURE — 63600175 PHARM REV CODE 636 W HCPCS: Performed by: NURSE PRACTITIONER

## 2024-10-21 PROCEDURE — 87086 URINE CULTURE/COLONY COUNT: CPT | Performed by: INTERNAL MEDICINE

## 2024-10-21 PROCEDURE — 99233 SBSQ HOSP IP/OBS HIGH 50: CPT | Mod: ,,, | Performed by: INTERNAL MEDICINE

## 2024-10-21 PROCEDURE — 81000 URINALYSIS NONAUTO W/SCOPE: CPT | Performed by: INTERNAL MEDICINE

## 2024-10-21 PROCEDURE — 25000003 PHARM REV CODE 250: Performed by: INTERNAL MEDICINE

## 2024-10-21 PROCEDURE — 21400001 HC TELEMETRY ROOM

## 2024-10-21 PROCEDURE — 80053 COMPREHEN METABOLIC PANEL: CPT | Performed by: NURSE PRACTITIONER

## 2024-10-21 PROCEDURE — 63600175 PHARM REV CODE 636 W HCPCS: Performed by: RADIOLOGY

## 2024-10-21 PROCEDURE — 25000003 PHARM REV CODE 250: Performed by: NURSE PRACTITIONER

## 2024-10-21 PROCEDURE — 63600175 PHARM REV CODE 636 W HCPCS: Performed by: INTERNAL MEDICINE

## 2024-10-21 PROCEDURE — 83735 ASSAY OF MAGNESIUM: CPT | Performed by: NURSE PRACTITIONER

## 2024-10-21 PROCEDURE — 36415 COLL VENOUS BLD VENIPUNCTURE: CPT | Performed by: NURSE PRACTITIONER

## 2024-10-21 PROCEDURE — 0TB03ZX EXCISION OF RIGHT KIDNEY, PERCUTANEOUS APPROACH, DIAGNOSTIC: ICD-10-PCS | Performed by: RADIOLOGY

## 2024-10-21 RX ORDER — MIDAZOLAM HYDROCHLORIDE 1 MG/ML
INJECTION, SOLUTION INTRAMUSCULAR; INTRAVENOUS CODE/TRAUMA/SEDATION MEDICATION
Status: COMPLETED | OUTPATIENT
Start: 2024-10-21 | End: 2024-10-21

## 2024-10-21 RX ORDER — SODIUM BICARBONATE 650 MG/1
1300 TABLET ORAL 3 TIMES DAILY
Status: DISCONTINUED | OUTPATIENT
Start: 2024-10-21 | End: 2024-10-25 | Stop reason: HOSPADM

## 2024-10-21 RX ORDER — MAGNESIUM SULFATE HEPTAHYDRATE 40 MG/ML
4 INJECTION, SOLUTION INTRAVENOUS ONCE
Status: DISCONTINUED | OUTPATIENT
Start: 2024-10-21 | End: 2024-10-21

## 2024-10-21 RX ORDER — ERGOCALCIFEROL 1.25 MG/1
50000 CAPSULE ORAL
Status: DISCONTINUED | OUTPATIENT
Start: 2024-10-21 | End: 2024-10-25 | Stop reason: HOSPADM

## 2024-10-21 RX ORDER — LIDOCAINE HYDROCHLORIDE 10 MG/ML
INJECTION, SOLUTION INFILTRATION; PERINEURAL CODE/TRAUMA/SEDATION MEDICATION
Status: COMPLETED | OUTPATIENT
Start: 2024-10-21 | End: 2024-10-21

## 2024-10-21 RX ORDER — FENTANYL CITRATE 50 UG/ML
INJECTION, SOLUTION INTRAMUSCULAR; INTRAVENOUS CODE/TRAUMA/SEDATION MEDICATION
Status: COMPLETED | OUTPATIENT
Start: 2024-10-21 | End: 2024-10-21

## 2024-10-21 RX ORDER — MAGNESIUM SULFATE HEPTAHYDRATE 40 MG/ML
2 INJECTION, SOLUTION INTRAVENOUS ONCE
Status: COMPLETED | OUTPATIENT
Start: 2024-10-21 | End: 2024-10-21

## 2024-10-21 RX ADMIN — MAGNESIUM SULFATE HEPTAHYDRATE 2 G: 40 INJECTION, SOLUTION INTRAVENOUS at 08:10

## 2024-10-21 RX ADMIN — FENTANYL CITRATE 25 MCG: 0.05 INJECTION, SOLUTION INTRAMUSCULAR; INTRAVENOUS at 10:10

## 2024-10-21 RX ADMIN — SODIUM ZIRCONIUM CYCLOSILICATE 10 G: 5 POWDER, FOR SUSPENSION ORAL at 04:10

## 2024-10-21 RX ADMIN — FUROSEMIDE 40 MG: 10 INJECTION, SOLUTION INTRAMUSCULAR; INTRAVENOUS at 08:10

## 2024-10-21 RX ADMIN — FAMOTIDINE 20 MG: 20 TABLET ORAL at 08:10

## 2024-10-21 RX ADMIN — SODIUM BICARBONATE 650 MG TABLET 650 MG: at 03:10

## 2024-10-21 RX ADMIN — CEFTRIAXONE 2 G: 2 INJECTION, POWDER, FOR SOLUTION INTRAMUSCULAR; INTRAVENOUS at 01:10

## 2024-10-21 RX ADMIN — SENNOSIDES AND DOCUSATE SODIUM 1 TABLET: 50; 8.6 TABLET ORAL at 09:10

## 2024-10-21 RX ADMIN — SODIUM BICARBONATE 650 MG TABLET 650 MG: at 08:10

## 2024-10-21 RX ADMIN — SODIUM BICARBONATE 650 MG TABLET 1300 MG: at 09:10

## 2024-10-21 RX ADMIN — ERGOCALCIFEROL 50000 UNITS: 1.25 CAPSULE ORAL at 02:10

## 2024-10-21 RX ADMIN — LIDOCAINE HYDROCHLORIDE 5 ML: 10 INJECTION, SOLUTION INFILTRATION; PERINEURAL at 10:10

## 2024-10-21 RX ADMIN — MIDAZOLAM 0.5 MG: 1 INJECTION INTRAMUSCULAR; INTRAVENOUS at 10:10

## 2024-10-21 RX ADMIN — FUROSEMIDE 40 MG: 10 INJECTION, SOLUTION INTRAMUSCULAR; INTRAVENOUS at 09:10

## 2024-10-21 RX ADMIN — Medication 400 MG: at 08:10

## 2024-10-22 LAB
ALBUMIN SERPL BCP-MCNC: 0.8 G/DL (ref 3.5–5.2)
ANA PATTERN 1: NORMAL
ANA SER QL IF: POSITIVE
ANA TITR SER IF: 2561 {TITER}
ANION GAP SERPL CALC-SCNC: 2 MMOL/L (ref 8–16)
ANISOCYTOSIS BLD QL SMEAR: SLIGHT
ANTI SM ANTIBODY: 6.84 RATIO (ref 0–0.99)
ANTI SM/RNP ANTIBODY: 6.21 RATIO (ref 0–0.99)
ANTI-SM INTERPRETATION: POSITIVE
ANTI-SM/RNP INTERPRETATION: POSITIVE
ANTI-SSA ANTIBODY: 5.47 RATIO (ref 0–0.99)
ANTI-SSA INTERPRETATION: POSITIVE
ANTI-SSB ANTIBODY: 0.82 RATIO (ref 0–0.99)
ANTI-SSB INTERPRETATION: NEGATIVE
BACTERIA UR CULT: NO GROWTH
BASOPHILS # BLD AUTO: 0.02 K/UL (ref 0–0.2)
BASOPHILS NFR BLD: 0.4 % (ref 0–1.9)
BM IGG SER-ACNC: <0.2 U
BUN SERPL-MCNC: 27 MG/DL (ref 6–20)
BURR CELLS BLD QL SMEAR: ABNORMAL
CALCIUM SERPL-MCNC: 7.4 MG/DL (ref 8.7–10.5)
CHLORIDE SERPL-SCNC: 112 MMOL/L (ref 95–110)
CO2 SERPL-SCNC: 21 MMOL/L (ref 23–29)
CREAT SERPL-MCNC: 2.4 MG/DL (ref 0.5–1.4)
DACRYOCYTES BLD QL SMEAR: ABNORMAL
DIFFERENTIAL METHOD BLD: ABNORMAL
DSDNA AB SER-ACNC: ABNORMAL [IU]/ML
EOSINOPHIL # BLD AUTO: 0.1 K/UL (ref 0–0.5)
EOSINOPHIL NFR BLD: 1.6 % (ref 0–8)
ERYTHROCYTE [DISTWIDTH] IN BLOOD BY AUTOMATED COUNT: 14.9 % (ref 11.5–14.5)
EST. GFR  (NO RACE VARIABLE): 37 ML/MIN/1.73 M^2
GLUCOSE SERPL-MCNC: 67 MG/DL (ref 70–110)
HAPTOGLOB SERPL-MCNC: 189 MG/DL (ref 30–250)
HCT VFR BLD AUTO: 34.3 % (ref 40–54)
HGB BLD-MCNC: 11.1 G/DL (ref 14–18)
IMM GRANULOCYTES # BLD AUTO: 0.05 K/UL (ref 0–0.04)
IMM GRANULOCYTES NFR BLD AUTO: 1 % (ref 0–0.5)
LYMPHOCYTES # BLD AUTO: 1.1 K/UL (ref 1–4.8)
LYMPHOCYTES NFR BLD: 22.2 % (ref 18–48)
MAGNESIUM SERPL-MCNC: 1.8 MG/DL (ref 1.6–2.6)
MCH RBC QN AUTO: 28 PG (ref 27–31)
MCHC RBC AUTO-ENTMCNC: 32.4 G/DL (ref 32–36)
MCV RBC AUTO: 87 FL (ref 82–98)
MONOCYTES # BLD AUTO: 0.3 K/UL (ref 0.3–1)
MONOCYTES NFR BLD: 6.3 % (ref 4–15)
NEUTROPHILS # BLD AUTO: 3.4 K/UL (ref 1.8–7.7)
NEUTROPHILS NFR BLD: 68.5 % (ref 38–73)
NRBC BLD-RTO: 0 /100 WBC
OVALOCYTES BLD QL SMEAR: ABNORMAL
PHOSPHATE SERPL-MCNC: 4.7 MG/DL (ref 2.7–4.5)
PLATELET # BLD AUTO: 174 K/UL (ref 150–450)
PLATELET BLD QL SMEAR: ABNORMAL
PMV BLD AUTO: 8.9 FL (ref 9.2–12.9)
POIKILOCYTOSIS BLD QL SMEAR: SLIGHT
POTASSIUM SERPL-SCNC: 5.2 MMOL/L (ref 3.5–5.1)
RBC # BLD AUTO: 3.96 M/UL (ref 4.6–6.2)
SODIUM SERPL-SCNC: 135 MMOL/L (ref 136–145)
TARGETS BLD QL SMEAR: ABNORMAL
WBC # BLD AUTO: 4.9 K/UL (ref 3.9–12.7)

## 2024-10-22 PROCEDURE — 21400001 HC TELEMETRY ROOM

## 2024-10-22 PROCEDURE — 85025 COMPLETE CBC W/AUTO DIFF WBC: CPT | Performed by: NURSE PRACTITIONER

## 2024-10-22 PROCEDURE — 63600175 PHARM REV CODE 636 W HCPCS: Performed by: NURSE PRACTITIONER

## 2024-10-22 PROCEDURE — 99233 SBSQ HOSP IP/OBS HIGH 50: CPT | Mod: ,,, | Performed by: INTERNAL MEDICINE

## 2024-10-22 PROCEDURE — 86235 NUCLEAR ANTIGEN ANTIBODY: CPT | Performed by: INTERNAL MEDICINE

## 2024-10-22 PROCEDURE — 83735 ASSAY OF MAGNESIUM: CPT | Performed by: NURSE PRACTITIONER

## 2024-10-22 PROCEDURE — 63600175 PHARM REV CODE 636 W HCPCS: Performed by: INTERNAL MEDICINE

## 2024-10-22 PROCEDURE — 83789 MASS SPECTROMETRY QUAL/QUAN: CPT | Performed by: INTERNAL MEDICINE

## 2024-10-22 PROCEDURE — 25000003 PHARM REV CODE 250: Performed by: INTERNAL MEDICINE

## 2024-10-22 PROCEDURE — 85610 PROTHROMBIN TIME: CPT | Performed by: INTERNAL MEDICINE

## 2024-10-22 PROCEDURE — 85613 RUSSELL VIPER VENOM DILUTED: CPT | Performed by: INTERNAL MEDICINE

## 2024-10-22 PROCEDURE — 83520 IMMUNOASSAY QUANT NOS NONAB: CPT | Performed by: INTERNAL MEDICINE

## 2024-10-22 PROCEDURE — 83020 HEMOGLOBIN ELECTROPHORESIS: CPT | Performed by: INTERNAL MEDICINE

## 2024-10-22 PROCEDURE — 25000003 PHARM REV CODE 250: Performed by: NURSE PRACTITIONER

## 2024-10-22 PROCEDURE — 80069 RENAL FUNCTION PANEL: CPT | Performed by: INTERNAL MEDICINE

## 2024-10-22 PROCEDURE — 36415 COLL VENOUS BLD VENIPUNCTURE: CPT | Performed by: INTERNAL MEDICINE

## 2024-10-22 PROCEDURE — 86146 BETA-2 GLYCOPROTEIN ANTIBODY: CPT | Mod: 59 | Performed by: INTERNAL MEDICINE

## 2024-10-22 PROCEDURE — 83010 ASSAY OF HAPTOGLOBIN QUANT: CPT | Performed by: INTERNAL MEDICINE

## 2024-10-22 PROCEDURE — 86147 CARDIOLIPIN ANTIBODY EA IG: CPT | Mod: 59 | Performed by: INTERNAL MEDICINE

## 2024-10-22 RX ORDER — PANTOPRAZOLE SODIUM 40 MG/1
40 TABLET, DELAYED RELEASE ORAL DAILY
Status: DISCONTINUED | OUTPATIENT
Start: 2024-10-22 | End: 2024-10-23

## 2024-10-22 RX ADMIN — SENNOSIDES AND DOCUSATE SODIUM 1 TABLET: 50; 8.6 TABLET ORAL at 08:10

## 2024-10-22 RX ADMIN — CEFTRIAXONE 2 G: 2 INJECTION, POWDER, FOR SOLUTION INTRAMUSCULAR; INTRAVENOUS at 01:10

## 2024-10-22 RX ADMIN — PANTOPRAZOLE SODIUM 40 MG: 40 TABLET, DELAYED RELEASE ORAL at 01:10

## 2024-10-22 RX ADMIN — SENNOSIDES AND DOCUSATE SODIUM 1 TABLET: 50; 8.6 TABLET ORAL at 09:10

## 2024-10-22 RX ADMIN — METHYLPREDNISOLONE SODIUM SUCCINATE 1000 MG: 1 INJECTION, POWDER, LYOPHILIZED, FOR SOLUTION INTRAMUSCULAR; INTRAVENOUS at 09:10

## 2024-10-22 RX ADMIN — SODIUM BICARBONATE 650 MG TABLET 1300 MG: at 09:10

## 2024-10-22 RX ADMIN — FAMOTIDINE 20 MG: 20 TABLET ORAL at 08:10

## 2024-10-22 RX ADMIN — SODIUM BICARBONATE 650 MG TABLET 1300 MG: at 08:10

## 2024-10-22 RX ADMIN — FUROSEMIDE 40 MG: 10 INJECTION, SOLUTION INTRAMUSCULAR; INTRAVENOUS at 09:10

## 2024-10-22 RX ADMIN — FUROSEMIDE 40 MG: 10 INJECTION, SOLUTION INTRAMUSCULAR; INTRAVENOUS at 08:10

## 2024-10-22 RX ADMIN — SODIUM BICARBONATE 650 MG TABLET 1300 MG: at 01:10

## 2024-10-23 LAB
ALBUMIN SERPL BCP-MCNC: 0.9 G/DL (ref 3.5–5.2)
ANCA AB TITR SER IF: NORMAL TITER
ANCA AB TITR SER IF: NORMAL TITER
ANION GAP SERPL CALC-SCNC: 7 MMOL/L (ref 8–16)
BASOPHILS # BLD AUTO: 0.01 K/UL (ref 0–0.2)
BASOPHILS NFR BLD: 0.1 % (ref 0–1.9)
BUN SERPL-MCNC: 40 MG/DL (ref 6–20)
CALCIUM SERPL-MCNC: 7.4 MG/DL (ref 8.7–10.5)
CHLORIDE SERPL-SCNC: 109 MMOL/L (ref 95–110)
CO2 SERPL-SCNC: 19 MMOL/L (ref 23–29)
CREAT SERPL-MCNC: 3.1 MG/DL (ref 0.5–1.4)
DIFFERENTIAL METHOD BLD: ABNORMAL
EOSINOPHIL # BLD AUTO: 0 K/UL (ref 0–0.5)
EOSINOPHIL NFR BLD: 0 % (ref 0–8)
ERYTHROCYTE [DISTWIDTH] IN BLOOD BY AUTOMATED COUNT: 14.6 % (ref 11.5–14.5)
EST. GFR  (NO RACE VARIABLE): 27 ML/MIN/1.73 M^2
GLUCOSE SERPL-MCNC: 144 MG/DL (ref 70–110)
HCT VFR BLD AUTO: 32.2 % (ref 40–54)
HGB BLD-MCNC: 11.3 G/DL (ref 14–18)
IMM GRANULOCYTES # BLD AUTO: 0.06 K/UL (ref 0–0.04)
IMM GRANULOCYTES NFR BLD AUTO: 0.6 % (ref 0–0.5)
LYMPHOCYTES # BLD AUTO: 1 K/UL (ref 1–4.8)
LYMPHOCYTES NFR BLD: 11.1 % (ref 18–48)
MCH RBC QN AUTO: 29 PG (ref 27–31)
MCHC RBC AUTO-ENTMCNC: 35.1 G/DL (ref 32–36)
MCV RBC AUTO: 83 FL (ref 82–98)
MONOCYTES # BLD AUTO: 0.4 K/UL (ref 0.3–1)
MONOCYTES NFR BLD: 4.4 % (ref 4–15)
NEUTROPHILS # BLD AUTO: 7.8 K/UL (ref 1.8–7.7)
NEUTROPHILS NFR BLD: 83.8 % (ref 38–73)
NRBC BLD-RTO: 0 /100 WBC
P-ANCA TITR SER IF: NORMAL TITER
P-ANCA TITR SER IF: NORMAL TITER
PATHOLOGIST INTERPRETATION IFE: NORMAL
PATHOLOGIST INTERPRETATION SPE: NORMAL
PHOSPHATE SERPL-MCNC: 6 MG/DL (ref 2.7–4.5)
PLATELET # BLD AUTO: 203 K/UL (ref 150–450)
PMV BLD AUTO: 9.6 FL (ref 9.2–12.9)
POTASSIUM SERPL-SCNC: 5.3 MMOL/L (ref 3.5–5.1)
RBC # BLD AUTO: 3.89 M/UL (ref 4.6–6.2)
SODIUM SERPL-SCNC: 135 MMOL/L (ref 136–145)
WBC # BLD AUTO: 9.28 K/UL (ref 3.9–12.7)

## 2024-10-23 PROCEDURE — 25000003 PHARM REV CODE 250: Performed by: NURSE PRACTITIONER

## 2024-10-23 PROCEDURE — 25000003 PHARM REV CODE 250: Performed by: INTERNAL MEDICINE

## 2024-10-23 PROCEDURE — 85025 COMPLETE CBC W/AUTO DIFF WBC: CPT | Performed by: INTERNAL MEDICINE

## 2024-10-23 PROCEDURE — 63600175 PHARM REV CODE 636 W HCPCS: Performed by: INTERNAL MEDICINE

## 2024-10-23 PROCEDURE — 36410 VNPNXR 3YR/> PHY/QHP DX/THER: CPT

## 2024-10-23 PROCEDURE — 21400001 HC TELEMETRY ROOM

## 2024-10-23 PROCEDURE — 80069 RENAL FUNCTION PANEL: CPT | Performed by: INTERNAL MEDICINE

## 2024-10-23 PROCEDURE — C1751 CATH, INF, PER/CENT/MIDLINE: HCPCS

## 2024-10-23 PROCEDURE — 51798 US URINE CAPACITY MEASURE: CPT

## 2024-10-23 PROCEDURE — 63600175 PHARM REV CODE 636 W HCPCS: Performed by: NURSE PRACTITIONER

## 2024-10-23 PROCEDURE — 99233 SBSQ HOSP IP/OBS HIGH 50: CPT | Mod: ,,, | Performed by: INTERNAL MEDICINE

## 2024-10-23 RX ORDER — SODIUM CHLORIDE 0.9 % (FLUSH) 0.9 %
10 SYRINGE (ML) INJECTION EVERY 12 HOURS PRN
Status: DISCONTINUED | OUTPATIENT
Start: 2024-10-23 | End: 2024-10-25 | Stop reason: HOSPADM

## 2024-10-23 RX ORDER — PANTOPRAZOLE SODIUM 40 MG/1
40 TABLET, DELAYED RELEASE ORAL 2 TIMES DAILY
Status: DISCONTINUED | OUTPATIENT
Start: 2024-10-23 | End: 2024-10-25 | Stop reason: HOSPADM

## 2024-10-23 RX ORDER — SUCRALFATE 1 G/10ML
1 SUSPENSION ORAL 2 TIMES DAILY
Status: DISCONTINUED | OUTPATIENT
Start: 2024-10-23 | End: 2024-10-25 | Stop reason: HOSPADM

## 2024-10-23 RX ORDER — HYDROXYCHLOROQUINE SULFATE 200 MG/1
200 TABLET, FILM COATED ORAL DAILY
Status: DISCONTINUED | OUTPATIENT
Start: 2024-10-23 | End: 2024-10-23

## 2024-10-23 RX ORDER — MYCOPHENOLATE MOFETIL 500 MG/1
500 TABLET ORAL 2 TIMES DAILY
Status: DISCONTINUED | OUTPATIENT
Start: 2024-10-23 | End: 2024-10-25 | Stop reason: HOSPADM

## 2024-10-23 RX ADMIN — SODIUM BICARBONATE 650 MG TABLET 1300 MG: at 02:10

## 2024-10-23 RX ADMIN — MYCOPHENOLATE MOFETIL 500 MG: 500 TABLET, FILM COATED ORAL at 08:10

## 2024-10-23 RX ADMIN — PANTOPRAZOLE SODIUM 40 MG: 40 TABLET, DELAYED RELEASE ORAL at 08:10

## 2024-10-23 RX ADMIN — SENNOSIDES AND DOCUSATE SODIUM 1 TABLET: 50; 8.6 TABLET ORAL at 08:10

## 2024-10-23 RX ADMIN — SUCRALFATE 1 G: 1 SUSPENSION ORAL at 08:10

## 2024-10-23 RX ADMIN — SODIUM BICARBONATE 650 MG TABLET 1300 MG: at 08:10

## 2024-10-23 RX ADMIN — FUROSEMIDE 40 MG: 10 INJECTION, SOLUTION INTRAMUSCULAR; INTRAVENOUS at 08:10

## 2024-10-23 RX ADMIN — METHYLPREDNISOLONE SODIUM SUCCINATE 1000 MG: 1 INJECTION, POWDER, LYOPHILIZED, FOR SOLUTION INTRAMUSCULAR; INTRAVENOUS at 04:10

## 2024-10-24 PROBLEM — J18.9 PNEUMONIA: Status: RESOLVED | Noted: 2024-10-19 | Resolved: 2024-10-24

## 2024-10-24 PROBLEM — E87.29 HYPERCHLOREMIC METABOLIC ACIDOSIS: Status: RESOLVED | Noted: 2024-10-20 | Resolved: 2024-10-24

## 2024-10-24 PROBLEM — D64.9 SYMPTOMATIC ANEMIA: Status: RESOLVED | Noted: 2024-10-19 | Resolved: 2024-10-24

## 2024-10-24 PROBLEM — E83.42 HYPOMAGNESEMIA: Status: RESOLVED | Noted: 2024-10-20 | Resolved: 2024-10-24

## 2024-10-24 LAB
ALBUMIN SERPL BCP-MCNC: 1 G/DL (ref 3.5–5.2)
ALP SERPL-CCNC: 75 U/L (ref 40–150)
ALT SERPL W/O P-5'-P-CCNC: 10 U/L (ref 10–44)
ANION GAP SERPL CALC-SCNC: 8 MMOL/L (ref 8–16)
AST SERPL-CCNC: 17 U/L (ref 10–40)
BASOPHILS # BLD AUTO: 0.02 K/UL (ref 0–0.2)
BASOPHILS NFR BLD: 0.1 % (ref 0–1.9)
BILIRUB SERPL-MCNC: 0.1 MG/DL (ref 0.1–1)
BUN SERPL-MCNC: 47 MG/DL (ref 6–20)
CALCIUM SERPL-MCNC: 7.3 MG/DL (ref 8.7–10.5)
CHLORIDE SERPL-SCNC: 112 MMOL/L (ref 95–110)
CO2 SERPL-SCNC: 18 MMOL/L (ref 23–29)
CONFIRM DRVVT STA-STACLOT: NORMAL S
CREAT SERPL-MCNC: 3.2 MG/DL (ref 0.5–1.4)
DIFFERENTIAL METHOD BLD: ABNORMAL
DRVVT SCREEN TO CONFIRM RATIO: NORMAL {RATIO}
EOSINOPHIL # BLD AUTO: 0 K/UL (ref 0–0.5)
EOSINOPHIL NFR BLD: 0 % (ref 0–8)
ERYTHROCYTE [DISTWIDTH] IN BLOOD BY AUTOMATED COUNT: 14.5 % (ref 11.5–14.5)
EST. GFR  (NO RACE VARIABLE): 26 ML/MIN/1.73 M^2
GLUCOSE SERPL-MCNC: 153 MG/DL (ref 70–110)
HCT VFR BLD AUTO: 33.1 % (ref 40–54)
HEPARIN NT PPP QL: NORMAL
HGB BLD-MCNC: 11.5 G/DL (ref 14–18)
IMM GRANULOCYTES # BLD AUTO: 0.17 K/UL (ref 0–0.04)
IMM GRANULOCYTES NFR BLD AUTO: 1.2 % (ref 0–0.5)
LA 3 SCREEN W REFLEX-IMP: NORMAL
LMW HEPARIN IND PLT AB SER QL: NORMAL
LYMPHOCYTES # BLD AUTO: 1.1 K/UL (ref 1–4.8)
LYMPHOCYTES NFR BLD: 7.8 % (ref 18–48)
MCH RBC QN AUTO: 28.6 PG (ref 27–31)
MCHC RBC AUTO-ENTMCNC: 34.7 G/DL (ref 32–36)
MCV RBC AUTO: 82 FL (ref 82–98)
MIXING DRVVT/NORMAL: NORMAL %
MONOCYTES # BLD AUTO: 0.2 K/UL (ref 0.3–1)
MONOCYTES NFR BLD: 1.6 % (ref 4–15)
NEUTRALIZED DRVVT SCREEN RATIO: NORMAL
NEUTROPHILS # BLD AUTO: 12.9 K/UL (ref 1.8–7.7)
NEUTROPHILS NFR BLD: 89.3 % (ref 38–73)
NRBC BLD-RTO: 0 /100 WBC
PLATELET # BLD AUTO: 219 K/UL (ref 150–450)
PMV BLD AUTO: 9.3 FL (ref 9.2–12.9)
POTASSIUM SERPL-SCNC: 5.1 MMOL/L (ref 3.5–5.1)
PROT SERPL-MCNC: 5.5 G/DL (ref 6–8.4)
PROTHROMBIN TIME: 14 S (ref 12–15.5)
RBC # BLD AUTO: 4.02 M/UL (ref 4.6–6.2)
SCREEN APTT/NORMAL: 1.07
SCREEN APTT/NORMAL: NORMAL
SCREEN DRVVT/NORMAL: 0.92 %
SODIUM SERPL-SCNC: 138 MMOL/L (ref 136–145)
THROMBIN TIME: NORMAL S
WBC # BLD AUTO: 14.39 K/UL (ref 3.9–12.7)

## 2024-10-24 PROCEDURE — 80053 COMPREHEN METABOLIC PANEL: CPT | Performed by: INTERNAL MEDICINE

## 2024-10-24 PROCEDURE — 85025 COMPLETE CBC W/AUTO DIFF WBC: CPT | Performed by: INTERNAL MEDICINE

## 2024-10-24 PROCEDURE — 99232 SBSQ HOSP IP/OBS MODERATE 35: CPT | Mod: ,,, | Performed by: INTERNAL MEDICINE

## 2024-10-24 PROCEDURE — 63600175 PHARM REV CODE 636 W HCPCS: Performed by: INTERNAL MEDICINE

## 2024-10-24 PROCEDURE — 25000003 PHARM REV CODE 250: Performed by: INTERNAL MEDICINE

## 2024-10-24 PROCEDURE — 21400001 HC TELEMETRY ROOM

## 2024-10-24 PROCEDURE — 63600175 PHARM REV CODE 636 W HCPCS: Performed by: PHYSICIAN ASSISTANT

## 2024-10-24 PROCEDURE — 36415 COLL VENOUS BLD VENIPUNCTURE: CPT | Performed by: INTERNAL MEDICINE

## 2024-10-24 PROCEDURE — 25000003 PHARM REV CODE 250: Performed by: NURSE PRACTITIONER

## 2024-10-24 RX ORDER — HEPARIN SODIUM 5000 [USP'U]/ML
5000 INJECTION, SOLUTION INTRAVENOUS; SUBCUTANEOUS EVERY 8 HOURS
Status: DISCONTINUED | OUTPATIENT
Start: 2024-10-24 | End: 2024-10-25 | Stop reason: HOSPADM

## 2024-10-24 RX ORDER — FUROSEMIDE 40 MG/1
40 TABLET ORAL DAILY
Qty: 30 TABLET | Refills: 0 | Status: SHIPPED | OUTPATIENT
Start: 2024-10-24 | End: 2024-11-24

## 2024-10-24 RX ORDER — PREDNISONE 20 MG/1
60 TABLET ORAL DAILY
Qty: 90 TABLET | Refills: 0 | Status: SHIPPED | OUTPATIENT
Start: 2024-10-24 | End: 2024-11-24

## 2024-10-24 RX ORDER — MYCOPHENOLATE MOFETIL 500 MG/1
500 TABLET ORAL 2 TIMES DAILY
Qty: 60 TABLET | Refills: 0 | Status: SHIPPED | OUTPATIENT
Start: 2024-10-24 | End: 2024-11-24

## 2024-10-24 RX ORDER — PANTOPRAZOLE SODIUM 40 MG/1
40 TABLET, DELAYED RELEASE ORAL DAILY
Qty: 30 TABLET | Refills: 0 | Status: SHIPPED | OUTPATIENT
Start: 2024-10-24 | End: 2024-11-24

## 2024-10-24 RX ORDER — SODIUM BICARBONATE 650 MG/1
1300 TABLET ORAL 3 TIMES DAILY
Qty: 180 TABLET | Refills: 0 | Status: SHIPPED | OUTPATIENT
Start: 2024-10-24 | End: 2024-11-24

## 2024-10-24 RX ORDER — HYDROXYCHLOROQUINE SULFATE 200 MG/1
200 TABLET, FILM COATED ORAL DAILY
Status: DISCONTINUED | OUTPATIENT
Start: 2024-10-24 | End: 2024-10-25 | Stop reason: HOSPADM

## 2024-10-24 RX ORDER — HYDROXYCHLOROQUINE SULFATE 200 MG/1
200 TABLET, FILM COATED ORAL DAILY
Qty: 30 TABLET | Refills: 0 | Status: SHIPPED | OUTPATIENT
Start: 2024-10-25 | End: 2024-11-24

## 2024-10-24 RX ORDER — ERGOCALCIFEROL 1.25 MG/1
50000 CAPSULE ORAL
Qty: 12 CAPSULE | Refills: 0 | Status: SHIPPED | OUTPATIENT
Start: 2024-10-28 | End: 2025-01-14

## 2024-10-24 RX ADMIN — MYCOPHENOLATE MOFETIL 500 MG: 500 TABLET, FILM COATED ORAL at 08:10

## 2024-10-24 RX ADMIN — SENNOSIDES AND DOCUSATE SODIUM 1 TABLET: 50; 8.6 TABLET ORAL at 09:10

## 2024-10-24 RX ADMIN — SUCRALFATE 1 G: 1 SUSPENSION ORAL at 08:10

## 2024-10-24 RX ADMIN — SUCRALFATE 1 G: 1 SUSPENSION ORAL at 09:10

## 2024-10-24 RX ADMIN — SODIUM BICARBONATE 650 MG TABLET 1300 MG: at 09:10

## 2024-10-24 RX ADMIN — PANTOPRAZOLE SODIUM 40 MG: 40 TABLET, DELAYED RELEASE ORAL at 09:10

## 2024-10-24 RX ADMIN — HYDROXYCHLOROQUINE SULFATE 200 MG: 200 TABLET, FILM COATED ORAL at 04:10

## 2024-10-24 RX ADMIN — SENNOSIDES AND DOCUSATE SODIUM 1 TABLET: 50; 8.6 TABLET ORAL at 08:10

## 2024-10-24 RX ADMIN — SODIUM BICARBONATE 650 MG TABLET 1300 MG: at 04:10

## 2024-10-24 RX ADMIN — HEPARIN SODIUM 5000 UNITS: 5000 INJECTION INTRAVENOUS; SUBCUTANEOUS at 04:10

## 2024-10-24 RX ADMIN — METHYLPREDNISOLONE SODIUM SUCCINATE 1000 MG: 1 INJECTION, POWDER, LYOPHILIZED, FOR SOLUTION INTRAMUSCULAR; INTRAVENOUS at 05:10

## 2024-10-24 RX ADMIN — MYCOPHENOLATE MOFETIL 500 MG: 500 TABLET, FILM COATED ORAL at 09:10

## 2024-10-24 RX ADMIN — SODIUM BICARBONATE 650 MG TABLET 1300 MG: at 08:10

## 2024-10-24 RX ADMIN — PANTOPRAZOLE SODIUM 40 MG: 40 TABLET, DELAYED RELEASE ORAL at 08:10

## 2024-10-24 RX ADMIN — HEPARIN SODIUM 5000 UNITS: 5000 INJECTION INTRAVENOUS; SUBCUTANEOUS at 09:10

## 2024-10-25 VITALS
HEIGHT: 69 IN | DIASTOLIC BLOOD PRESSURE: 97 MMHG | BODY MASS INDEX: 28.44 KG/M2 | TEMPERATURE: 98 F | RESPIRATION RATE: 16 BRPM | WEIGHT: 192 LBS | OXYGEN SATURATION: 98 % | HEART RATE: 59 BPM | SYSTOLIC BLOOD PRESSURE: 165 MMHG

## 2024-10-25 LAB
B2 GLYCOPROT1 IGA SER QL: 3.8 U/ML
B2 GLYCOPROT1 IGG SER QL: 4.1 U/ML
B2 GLYCOPROT1 IGM SER QL: <2.4 U/ML
BACTERIA BLD CULT: NORMAL
CARDIOLIPIN IGG SER IA-ACNC: <9.4 GPL (ref 0–14.99)
CARDIOLIPIN IGM SER IA-ACNC: <9.4 MPL (ref 0–12.49)
ENA SCL70 AB SER-ACNC: 0.7 U/ML
HB ELECTROPHORESIS INTERP CANCEL: ABNORMAL
HB ELECTROPHORESIS INTERPRETATION: ABNORMAL
HB ELECTROPHORESIS SUMMARY INTERPRETATION: NORMAL
HEMOGLOBIN VARIANT BY MASS SPECTROMETRY: NORMAL
HGB A MFR BLD ELPH: 84.8 % (ref 95.8–98)
HGB A2 MFR BLD: 2.8 % (ref 2–3.3)
HGB A2+XXX MFR BLD ELPH: ABNORMAL %
HGB F MFR BLD: 0 % (ref 0–0.9)
HGB XXX MFR BLD ELPH: ABNORMAL %
HPLC HB VARIANT: ABNORMAL
PROVIDER SIGNING NAME: NORMAL

## 2024-10-25 PROCEDURE — 25000003 PHARM REV CODE 250: Performed by: INTERNAL MEDICINE

## 2024-10-25 PROCEDURE — 63600175 PHARM REV CODE 636 W HCPCS: Performed by: PHYSICIAN ASSISTANT

## 2024-10-25 PROCEDURE — 25000003 PHARM REV CODE 250: Performed by: NURSE PRACTITIONER

## 2024-10-25 PROCEDURE — 94761 N-INVAS EAR/PLS OXIMETRY MLT: CPT

## 2024-10-25 PROCEDURE — 63600175 PHARM REV CODE 636 W HCPCS: Performed by: NURSE PRACTITIONER

## 2024-10-25 PROCEDURE — 63600175 PHARM REV CODE 636 W HCPCS: Performed by: INTERNAL MEDICINE

## 2024-10-25 RX ADMIN — HYDRALAZINE HYDROCHLORIDE 10 MG: 20 INJECTION INTRAMUSCULAR; INTRAVENOUS at 01:10

## 2024-10-25 RX ADMIN — SODIUM BICARBONATE 650 MG TABLET 1300 MG: at 09:10

## 2024-10-25 RX ADMIN — PANTOPRAZOLE SODIUM 40 MG: 40 TABLET, DELAYED RELEASE ORAL at 09:10

## 2024-10-25 RX ADMIN — HEPARIN SODIUM 5000 UNITS: 5000 INJECTION INTRAVENOUS; SUBCUTANEOUS at 03:10

## 2024-10-25 RX ADMIN — MYCOPHENOLATE MOFETIL 500 MG: 500 TABLET, FILM COATED ORAL at 09:10

## 2024-10-25 RX ADMIN — SENNOSIDES AND DOCUSATE SODIUM 1 TABLET: 50; 8.6 TABLET ORAL at 09:10

## 2024-10-25 RX ADMIN — SODIUM BICARBONATE 650 MG TABLET 1300 MG: at 03:10

## 2024-10-25 RX ADMIN — SUCRALFATE 1 G: 1 SUSPENSION ORAL at 09:10

## 2024-10-25 RX ADMIN — HYDROXYCHLOROQUINE SULFATE 200 MG: 200 TABLET, FILM COATED ORAL at 09:10

## 2024-10-25 RX ADMIN — HEPARIN SODIUM 5000 UNITS: 5000 INJECTION INTRAVENOUS; SUBCUTANEOUS at 05:10

## 2024-10-29 LAB — RNAP III AB SER-ACNC: <20 UNITS

## 2024-10-30 LAB
FINAL PATHOLOGIC DIAGNOSIS: NORMAL
Lab: NORMAL

## 2024-11-13 ENCOUNTER — PATIENT MESSAGE (OUTPATIENT)
Dept: RESEARCH | Facility: HOSPITAL | Age: 24
End: 2024-11-13
Payer: MEDICAID

## 2024-12-10 ENCOUNTER — TELEPHONE (OUTPATIENT)
Dept: RHEUMATOLOGY | Facility: CLINIC | Age: 24
End: 2024-12-10
Payer: MEDICAID

## 2024-12-10 NOTE — TELEPHONE ENCOUNTER
----- Message from Med Assistant Stevenson sent at 12/10/2024 10:11 AM CST -----  Contact: Raymundo ([facility])    ----- Message -----  From: Brooke Elder  Sent: 12/10/2024   9:12 AM CST  To: Mannie Burnett Staff    Mrs. Fonseca from [facility] was calling to see if they received a referral that was sent over. A good call back number is [phone]    Thanks

## 2024-12-10 NOTE — TELEPHONE ENCOUNTER
Spoke with nurse . We have received referral . We are not accepting any new Medicaid patient at this time . Please call [facility] or [facility] .

## 2025-06-12 ENCOUNTER — HOSPITAL ENCOUNTER (INPATIENT)
Facility: HOSPITAL | Age: 25
LOS: 5 days | Discharge: ANOTHER HOSPITAL | DRG: 683 | End: 2025-06-17
Attending: EMERGENCY MEDICINE | Admitting: HOSPITALIST
Payer: MEDICAID

## 2025-06-12 DIAGNOSIS — N18.9 ACUTE RENAL FAILURE SUPERIMPOSED ON CHRONIC KIDNEY DISEASE, UNSPECIFIED ACUTE RENAL FAILURE TYPE, UNSPECIFIED CKD STAGE: ICD-10-CM

## 2025-06-12 DIAGNOSIS — R07.9 CHEST PAIN: ICD-10-CM

## 2025-06-12 DIAGNOSIS — R06.02 SHORTNESS OF BREATH: ICD-10-CM

## 2025-06-12 DIAGNOSIS — N17.9 ACUTE RENAL FAILURE SUPERIMPOSED ON CHRONIC KIDNEY DISEASE, UNSPECIFIED ACUTE RENAL FAILURE TYPE, UNSPECIFIED CKD STAGE: ICD-10-CM

## 2025-06-12 DIAGNOSIS — D50.9 IRON DEFICIENCY ANEMIA, UNSPECIFIED IRON DEFICIENCY ANEMIA TYPE: Primary | ICD-10-CM

## 2025-06-12 LAB
ABSOLUTE EOSINOPHIL (OHS): 0.02 K/UL
ABSOLUTE MONOCYTE (OHS): 0.53 K/UL (ref 0.3–1)
ABSOLUTE NEUTROPHIL COUNT (OHS): 4.51 K/UL (ref 1.8–7.7)
ALBUMIN SERPL BCP-MCNC: 1.1 G/DL (ref 3.5–5.2)
ALP SERPL-CCNC: 78 UNIT/L (ref 40–150)
ALT SERPL W/O P-5'-P-CCNC: 6 UNIT/L (ref 10–44)
ANION GAP (OHS): 9 MMOL/L (ref 8–16)
AST SERPL-CCNC: 21 UNIT/L (ref 11–45)
BASOPHILS # BLD AUTO: 0 K/UL
BASOPHILS NFR BLD AUTO: 0 %
BILIRUB SERPL-MCNC: 0.6 MG/DL (ref 0.1–1)
BNP SERPL-MCNC: 11 PG/ML (ref 0–99)
BUN SERPL-MCNC: 62 MG/DL (ref 6–20)
CALCIUM SERPL-MCNC: 7.4 MG/DL (ref 8.7–10.5)
CHLORIDE SERPL-SCNC: 113 MMOL/L (ref 95–110)
CO2 SERPL-SCNC: 14 MMOL/L (ref 23–29)
CREAT SERPL-MCNC: 4.4 MG/DL (ref 0.5–1.4)
ERYTHROCYTE [DISTWIDTH] IN BLOOD BY AUTOMATED COUNT: 15.9 % (ref 11.5–14.5)
GFR SERPLBLD CREATININE-BSD FMLA CKD-EPI: 18 ML/MIN/1.73/M2
GLUCOSE SERPL-MCNC: 82 MG/DL (ref 70–110)
HCT VFR BLD AUTO: 15.8 % (ref 40–54)
HGB BLD-MCNC: 5.2 GM/DL (ref 14–18)
IMM GRANULOCYTES # BLD AUTO: 0.04 K/UL (ref 0–0.04)
IMM GRANULOCYTES NFR BLD AUTO: 0.6 % (ref 0–0.5)
LYMPHOCYTES # BLD AUTO: 1.29 K/UL (ref 1–4.8)
MCH RBC QN AUTO: 27.1 PG (ref 27–31)
MCHC RBC AUTO-ENTMCNC: 32.9 G/DL (ref 32–36)
MCV RBC AUTO: 82 FL (ref 82–98)
NUCLEATED RBC (/100WBC) (OHS): 0 /100 WBC
PLATELET # BLD AUTO: 209 K/UL (ref 150–450)
PMV BLD AUTO: 9.6 FL (ref 9.2–12.9)
POTASSIUM SERPL-SCNC: 5.7 MMOL/L (ref 3.5–5.1)
PROT SERPL-MCNC: 5.2 GM/DL (ref 6–8.4)
RBC # BLD AUTO: 1.92 M/UL (ref 4.6–6.2)
RELATIVE EOSINOPHIL (OHS): 0.3 %
RELATIVE LYMPHOCYTE (OHS): 20.2 % (ref 18–48)
RELATIVE MONOCYTE (OHS): 8.3 % (ref 4–15)
RELATIVE NEUTROPHIL (OHS): 70.6 % (ref 38–73)
SODIUM SERPL-SCNC: 136 MMOL/L (ref 136–145)
TROPONIN I SERPL DL<=0.01 NG/ML-MCNC: 0.01 NG/ML
WBC # BLD AUTO: 6.39 K/UL (ref 3.9–12.7)

## 2025-06-12 PROCEDURE — 86850 RBC ANTIBODY SCREEN: CPT | Performed by: EMERGENCY MEDICINE

## 2025-06-12 PROCEDURE — 80053 COMPREHEN METABOLIC PANEL: CPT | Performed by: EMERGENCY MEDICINE

## 2025-06-12 PROCEDURE — 93010 ELECTROCARDIOGRAM REPORT: CPT | Mod: ,,, | Performed by: INTERNAL MEDICINE

## 2025-06-12 PROCEDURE — 99285 EMERGENCY DEPT VISIT HI MDM: CPT | Mod: 25

## 2025-06-12 PROCEDURE — 86901 BLOOD TYPING SEROLOGIC RH(D): CPT | Performed by: EMERGENCY MEDICINE

## 2025-06-12 PROCEDURE — 11000001 HC ACUTE MED/SURG PRIVATE ROOM

## 2025-06-12 PROCEDURE — 93005 ELECTROCARDIOGRAM TRACING: CPT

## 2025-06-12 PROCEDURE — 84484 ASSAY OF TROPONIN QUANT: CPT

## 2025-06-12 PROCEDURE — 83880 ASSAY OF NATRIURETIC PEPTIDE: CPT

## 2025-06-12 PROCEDURE — 85025 COMPLETE CBC W/AUTO DIFF WBC: CPT | Performed by: EMERGENCY MEDICINE

## 2025-06-12 RX ORDER — ONDANSETRON 4 MG/1
4 TABLET, FILM COATED ORAL 2 TIMES DAILY PRN
COMMUNITY
Start: 2025-06-12

## 2025-06-12 RX ORDER — PREDNISONE 5 MG/1
15 TABLET ORAL DAILY
Status: ON HOLD | COMMUNITY
Start: 2024-11-22 | End: 2025-06-20 | Stop reason: HOSPADM

## 2025-06-12 RX ORDER — LISINOPRIL 5 MG/1
5 TABLET ORAL DAILY
COMMUNITY
Start: 2025-05-12 | End: 2026-05-12

## 2025-06-12 RX ORDER — HYDROXYCHLOROQUINE SULFATE 200 MG/1
300 TABLET, FILM COATED ORAL DAILY
Status: ON HOLD | COMMUNITY
Start: 2024-11-22 | End: 2025-06-20 | Stop reason: HOSPADM

## 2025-06-12 RX ORDER — ERGOCALCIFEROL 1.25 MG/1
50000 CAPSULE ORAL
COMMUNITY
Start: 2024-11-22

## 2025-06-12 RX ORDER — DICLOFENAC SODIUM 10 MG/G
2 GEL TOPICAL 4 TIMES DAILY PRN
COMMUNITY
Start: 2025-06-12

## 2025-06-12 RX ORDER — FERROUS SULFATE 325(65) MG
325 TABLET ORAL DAILY
COMMUNITY
Start: 2024-11-30

## 2025-06-12 RX ORDER — HYDROCODONE BITARTRATE AND ACETAMINOPHEN 500; 5 MG/1; MG/1
TABLET ORAL
Status: DISCONTINUED | OUTPATIENT
Start: 2025-06-12 | End: 2025-06-17 | Stop reason: HOSPADM

## 2025-06-12 RX ORDER — CALCIUM CARBONATE 200(500)MG
500 TABLET,CHEWABLE ORAL
Status: ON HOLD | COMMUNITY
Start: 2024-12-06 | End: 2025-06-19

## 2025-06-13 PROBLEM — N18.9 ANEMIA DUE TO CHRONIC KIDNEY DISEASE: Status: ACTIVE | Noted: 2025-06-13

## 2025-06-13 PROBLEM — M32.14 LUPUS NEPHRITIS: Status: ACTIVE | Noted: 2025-06-13

## 2025-06-13 PROBLEM — M32.14 LUPUS NEPHRITIS: Chronic | Status: ACTIVE | Noted: 2025-06-13

## 2025-06-13 PROBLEM — N17.9 ACUTE KIDNEY INJURY SUPERIMPOSED ON CHRONIC KIDNEY DISEASE: Status: ACTIVE | Noted: 2025-06-13

## 2025-06-13 PROBLEM — N18.9 ACUTE KIDNEY INJURY SUPERIMPOSED ON CHRONIC KIDNEY DISEASE: Status: ACTIVE | Noted: 2025-06-13

## 2025-06-13 PROBLEM — I10 HYPERTENSION: Chronic | Status: ACTIVE | Noted: 2024-10-19

## 2025-06-13 PROBLEM — D63.1 ANEMIA DUE TO CHRONIC KIDNEY DISEASE: Chronic | Status: ACTIVE | Noted: 2025-06-13

## 2025-06-13 PROBLEM — D63.1 ANEMIA DUE TO CHRONIC KIDNEY DISEASE: Status: ACTIVE | Noted: 2025-06-13

## 2025-06-13 PROBLEM — N18.9 ANEMIA DUE TO CHRONIC KIDNEY DISEASE: Chronic | Status: ACTIVE | Noted: 2025-06-13

## 2025-06-13 LAB
ABO + RH BLD: NORMAL
ABO + RH BLD: NORMAL
ABSOLUTE EOSINOPHIL (OHS): 0.04 K/UL
ABSOLUTE MONOCYTE (OHS): 0.38 K/UL (ref 0.3–1)
ABSOLUTE NEUTROPHIL COUNT (OHS): 2.81 K/UL (ref 1.8–7.7)
ABSOLUTE NEUTROPHIL MANUAL (OHS): 4.1 K/UL
ALBUMIN SERPL BCP-MCNC: 0.9 G/DL (ref 3.5–5.2)
ALP SERPL-CCNC: 60 UNIT/L (ref 40–150)
ALT SERPL W/O P-5'-P-CCNC: 7 UNIT/L (ref 10–44)
ANION GAP (OHS): 5 MMOL/L (ref 8–16)
ANION GAP (OHS): 5 MMOL/L (ref 8–16)
ANION GAP (OHS): 6 MMOL/L (ref 8–16)
ANION GAP (OHS): 7 MMOL/L (ref 8–16)
AST SERPL-CCNC: 19 UNIT/L (ref 11–45)
BACTERIA #/AREA URNS AUTO: ABNORMAL /HPF
BASOPHILS # BLD AUTO: 0 K/UL
BASOPHILS NFR BLD AUTO: 0 %
BILIRUB SERPL-MCNC: 0.7 MG/DL (ref 0.1–1)
BILIRUB UR QL STRIP.AUTO: NEGATIVE
BLD GP AB SCN CELLS X3 SERPL QL: NORMAL
BLD PROD TYP BPU: NORMAL
BLD PROD TYP BPU: NORMAL
BLOOD UNIT EXPIRATION DATE: NORMAL
BLOOD UNIT EXPIRATION DATE: NORMAL
BLOOD UNIT TYPE CODE: 5100
BLOOD UNIT TYPE CODE: 5100
BUN SERPL-MCNC: 62 MG/DL (ref 6–20)
BUN SERPL-MCNC: 63 MG/DL (ref 6–20)
BUN SERPL-MCNC: 63 MG/DL (ref 6–20)
BUN SERPL-MCNC: 65 MG/DL (ref 6–20)
CALCIUM SERPL-MCNC: 7 MG/DL (ref 8.7–10.5)
CALCIUM SERPL-MCNC: 7.1 MG/DL (ref 8.7–10.5)
CALCIUM SERPL-MCNC: 7.4 MG/DL (ref 8.7–10.5)
CALCIUM SERPL-MCNC: 7.6 MG/DL (ref 8.7–10.5)
CHLORIDE SERPL-SCNC: 111 MMOL/L (ref 95–110)
CHLORIDE SERPL-SCNC: 112 MMOL/L (ref 95–110)
CHLORIDE SERPL-SCNC: 112 MMOL/L (ref 95–110)
CHLORIDE SERPL-SCNC: 115 MMOL/L (ref 95–110)
CK SERPL-CCNC: 170 U/L (ref 20–200)
CLARITY UR: ABNORMAL
CO2 SERPL-SCNC: 15 MMOL/L (ref 23–29)
CO2 SERPL-SCNC: 16 MMOL/L (ref 23–29)
CO2 SERPL-SCNC: 17 MMOL/L (ref 23–29)
CO2 SERPL-SCNC: 18 MMOL/L (ref 23–29)
COLOR UR AUTO: YELLOW
CREAT SERPL-MCNC: 4 MG/DL (ref 0.5–1.4)
CREAT SERPL-MCNC: 4.1 MG/DL (ref 0.5–1.4)
CREAT SERPL-MCNC: 4.2 MG/DL (ref 0.5–1.4)
CREAT SERPL-MCNC: 4.3 MG/DL (ref 0.5–1.4)
CREAT UR-MCNC: 266.1 MG/DL (ref 23–375)
CROSSMATCH INTERPRETATION: NORMAL
CROSSMATCH INTERPRETATION: NORMAL
DISPENSE STATUS: NORMAL
DISPENSE STATUS: NORMAL
ERYTHROCYTE [DISTWIDTH] IN BLOOD BY AUTOMATED COUNT: 14.3 % (ref 11.5–14.5)
ERYTHROCYTE [DISTWIDTH] IN BLOOD BY AUTOMATED COUNT: 15.2 % (ref 11.5–14.5)
GFR SERPLBLD CREATININE-BSD FMLA CKD-EPI: 18 ML/MIN/1.73/M2
GFR SERPLBLD CREATININE-BSD FMLA CKD-EPI: 19 ML/MIN/1.73/M2
GFR SERPLBLD CREATININE-BSD FMLA CKD-EPI: 19 ML/MIN/1.73/M2
GFR SERPLBLD CREATININE-BSD FMLA CKD-EPI: 20 ML/MIN/1.73/M2
GLUCOSE SERPL-MCNC: 103 MG/DL (ref 70–110)
GLUCOSE SERPL-MCNC: 72 MG/DL (ref 70–110)
GLUCOSE SERPL-MCNC: 77 MG/DL (ref 70–110)
GLUCOSE SERPL-MCNC: 83 MG/DL (ref 70–110)
GLUCOSE UR QL STRIP: NEGATIVE
HCT VFR BLD AUTO: 15 % (ref 40–54)
HCT VFR BLD AUTO: 25.1 % (ref 40–54)
HGB BLD-MCNC: 5.1 GM/DL (ref 14–18)
HGB BLD-MCNC: 8.5 GM/DL (ref 14–18)
HGB UR QL STRIP: ABNORMAL
HOLD SPECIMEN: NORMAL
HYALINE CASTS UR QL AUTO: 72 /LPF (ref 0–1)
IMM GRANULOCYTES # BLD AUTO: 0.03 K/UL (ref 0–0.04)
IMM GRANULOCYTES NFR BLD AUTO: 0.7 % (ref 0–0.5)
KETONES UR QL STRIP: NEGATIVE
LEUKOCYTE ESTERASE UR QL STRIP: NEGATIVE
LYMPHOCYTES # BLD AUTO: 0.92 K/UL (ref 1–4.8)
LYMPHOCYTES NFR BLD MANUAL: 23 % (ref 18–48)
MCH RBC QN AUTO: 28.1 PG (ref 27–31)
MCH RBC QN AUTO: 28.3 PG (ref 27–31)
MCHC RBC AUTO-ENTMCNC: 33.9 G/DL (ref 32–36)
MCHC RBC AUTO-ENTMCNC: 34 G/DL (ref 32–36)
MCV RBC AUTO: 83 FL (ref 82–98)
MCV RBC AUTO: 83 FL (ref 82–98)
MICROSCOPIC COMMENT: ABNORMAL
MONOCYTES NFR BLD MANUAL: 10 % (ref 4–15)
NEUTROPHILS NFR BLD MANUAL: 67 % (ref 38–73)
NITRITE UR QL STRIP: NEGATIVE
NUCLEATED RBC (/100WBC) (OHS): 0 /100 WBC
NUCLEATED RBC (/100WBC) (OHS): 0 /100 WBC
PH UR STRIP: 7 [PH]
PLATELET # BLD AUTO: 143 K/UL (ref 150–450)
PLATELET # BLD AUTO: 175 K/UL (ref 150–450)
PMV BLD AUTO: 9.6 FL (ref 9.2–12.9)
PMV BLD AUTO: 9.8 FL (ref 9.2–12.9)
POCT GLUCOSE: 101 MG/DL (ref 70–110)
POCT GLUCOSE: 106 MG/DL (ref 70–110)
POCT GLUCOSE: 106 MG/DL (ref 70–110)
POCT GLUCOSE: 107 MG/DL (ref 70–110)
POCT GLUCOSE: 206 MG/DL (ref 70–110)
POCT GLUCOSE: 235 MG/DL (ref 70–110)
POCT GLUCOSE: 84 MG/DL (ref 70–110)
POTASSIUM SERPL-SCNC: 5.9 MMOL/L (ref 3.5–5.1)
POTASSIUM SERPL-SCNC: 6 MMOL/L (ref 3.5–5.1)
POTASSIUM SERPL-SCNC: 6.1 MMOL/L (ref 3.5–5.1)
POTASSIUM SERPL-SCNC: 6.1 MMOL/L (ref 3.5–5.1)
PROT SERPL-MCNC: 4.1 GM/DL (ref 6–8.4)
PROT UR QL STRIP: ABNORMAL
RBC # BLD AUTO: 1.8 M/UL (ref 4.6–6.2)
RBC # BLD AUTO: 3.03 M/UL (ref 4.6–6.2)
RBC #/AREA URNS AUTO: 16 /HPF (ref 0–4)
RELATIVE EOSINOPHIL (OHS): 1 %
RELATIVE LYMPHOCYTE (OHS): 22 % (ref 18–48)
RELATIVE MONOCYTE (OHS): 9.1 % (ref 4–15)
RELATIVE NEUTROPHIL (OHS): 67.2 % (ref 38–73)
RH BLD: NORMAL
SODIUM SERPL-SCNC: 134 MMOL/L (ref 136–145)
SODIUM SERPL-SCNC: 135 MMOL/L (ref 136–145)
SODIUM UR-SCNC: <20 MMOL/L (ref 20–250)
SP GR UR STRIP: 1.03
SPECIMEN OUTDATE: NORMAL
SQUAMOUS #/AREA URNS AUTO: 1 /HPF
UNIT NUMBER: NORMAL
UNIT NUMBER: NORMAL
UROBILINOGEN UR STRIP-ACNC: NEGATIVE EU/DL
WBC # BLD AUTO: 4.18 K/UL (ref 3.9–12.7)
WBC # BLD AUTO: 6.14 K/UL (ref 3.9–12.7)
WBC #/AREA URNS AUTO: 6 /HPF (ref 0–5)
WBC CLUMPS UR QL AUTO: ABNORMAL

## 2025-06-13 PROCEDURE — P9016 RBC LEUKOCYTES REDUCED: HCPCS | Performed by: EMERGENCY MEDICINE

## 2025-06-13 PROCEDURE — 80053 COMPREHEN METABOLIC PANEL: CPT | Performed by: HOSPITALIST

## 2025-06-13 PROCEDURE — 25000003 PHARM REV CODE 250: Performed by: HOSPITALIST

## 2025-06-13 PROCEDURE — 36415 COLL VENOUS BLD VENIPUNCTURE: CPT | Performed by: NURSE PRACTITIONER

## 2025-06-13 PROCEDURE — 36430 TRANSFUSION BLD/BLD COMPNT: CPT

## 2025-06-13 PROCEDURE — 82570 ASSAY OF URINE CREATININE: CPT | Performed by: NURSE PRACTITIONER

## 2025-06-13 PROCEDURE — 25000003 PHARM REV CODE 250: Performed by: NURSE PRACTITIONER

## 2025-06-13 PROCEDURE — 63600175 PHARM REV CODE 636 W HCPCS: Performed by: STUDENT IN AN ORGANIZED HEALTH CARE EDUCATION/TRAINING PROGRAM

## 2025-06-13 PROCEDURE — 25000003 PHARM REV CODE 250: Performed by: INTERNAL MEDICINE

## 2025-06-13 PROCEDURE — 86920 COMPATIBILITY TEST SPIN: CPT | Performed by: EMERGENCY MEDICINE

## 2025-06-13 PROCEDURE — 21400001 HC TELEMETRY ROOM

## 2025-06-13 PROCEDURE — 99223 1ST HOSP IP/OBS HIGH 75: CPT | Mod: ,,, | Performed by: NURSE PRACTITIONER

## 2025-06-13 PROCEDURE — 84300 ASSAY OF URINE SODIUM: CPT | Performed by: NURSE PRACTITIONER

## 2025-06-13 PROCEDURE — 82310 ASSAY OF CALCIUM: CPT | Performed by: STUDENT IN AN ORGANIZED HEALTH CARE EDUCATION/TRAINING PROGRAM

## 2025-06-13 PROCEDURE — 85025 COMPLETE CBC W/AUTO DIFF WBC: CPT | Performed by: STUDENT IN AN ORGANIZED HEALTH CARE EDUCATION/TRAINING PROGRAM

## 2025-06-13 PROCEDURE — 63600175 PHARM REV CODE 636 W HCPCS: Performed by: HOSPITALIST

## 2025-06-13 PROCEDURE — 63600175 PHARM REV CODE 636 W HCPCS: Performed by: INTERNAL MEDICINE

## 2025-06-13 PROCEDURE — 30233N1 TRANSFUSION OF NONAUTOLOGOUS RED BLOOD CELLS INTO PERIPHERAL VEIN, PERCUTANEOUS APPROACH: ICD-10-PCS | Performed by: STUDENT IN AN ORGANIZED HEALTH CARE EDUCATION/TRAINING PROGRAM

## 2025-06-13 PROCEDURE — 25000003 PHARM REV CODE 250: Performed by: STUDENT IN AN ORGANIZED HEALTH CARE EDUCATION/TRAINING PROGRAM

## 2025-06-13 PROCEDURE — 36415 COLL VENOUS BLD VENIPUNCTURE: CPT | Performed by: STUDENT IN AN ORGANIZED HEALTH CARE EDUCATION/TRAINING PROGRAM

## 2025-06-13 PROCEDURE — 82550 ASSAY OF CK (CPK): CPT | Performed by: NURSE PRACTITIONER

## 2025-06-13 PROCEDURE — 85025 COMPLETE CBC W/AUTO DIFF WBC: CPT | Performed by: HOSPITALIST

## 2025-06-13 PROCEDURE — 81003 URINALYSIS AUTO W/O SCOPE: CPT | Performed by: NURSE PRACTITIONER

## 2025-06-13 RX ORDER — AMOXICILLIN 250 MG
1 CAPSULE ORAL 2 TIMES DAILY PRN
Status: DISCONTINUED | OUTPATIENT
Start: 2025-06-13 | End: 2025-06-17 | Stop reason: HOSPADM

## 2025-06-13 RX ORDER — ALUMINUM HYDROXIDE, MAGNESIUM HYDROXIDE, AND SIMETHICONE 1200; 120; 1200 MG/30ML; MG/30ML; MG/30ML
30 SUSPENSION ORAL 4 TIMES DAILY PRN
Status: DISCONTINUED | OUTPATIENT
Start: 2025-06-13 | End: 2025-06-15

## 2025-06-13 RX ORDER — HYDROCODONE BITARTRATE AND ACETAMINOPHEN 5; 325 MG/1; MG/1
1 TABLET ORAL EVERY 6 HOURS PRN
Refills: 0 | Status: DISCONTINUED | OUTPATIENT
Start: 2025-06-13 | End: 2025-06-17 | Stop reason: HOSPADM

## 2025-06-13 RX ORDER — MORPHINE SULFATE 4 MG/ML
2 INJECTION, SOLUTION INTRAMUSCULAR; INTRAVENOUS EVERY 4 HOURS PRN
Refills: 0 | Status: DISCONTINUED | OUTPATIENT
Start: 2025-06-13 | End: 2025-06-17 | Stop reason: HOSPADM

## 2025-06-13 RX ORDER — ONDANSETRON HYDROCHLORIDE 2 MG/ML
4 INJECTION, SOLUTION INTRAVENOUS EVERY 8 HOURS PRN
Status: DISCONTINUED | OUTPATIENT
Start: 2025-06-13 | End: 2025-06-17 | Stop reason: HOSPADM

## 2025-06-13 RX ORDER — IBUPROFEN 200 MG
24 TABLET ORAL
Status: DISCONTINUED | OUTPATIENT
Start: 2025-06-13 | End: 2025-06-17 | Stop reason: HOSPADM

## 2025-06-13 RX ORDER — ACETAMINOPHEN 650 MG/1
650 SUPPOSITORY RECTAL EVERY 6 HOURS PRN
Status: DISCONTINUED | OUTPATIENT
Start: 2025-06-13 | End: 2025-06-17 | Stop reason: HOSPADM

## 2025-06-13 RX ORDER — SODIUM BICARBONATE 650 MG/1
1300 TABLET ORAL 3 TIMES DAILY
Status: DISCONTINUED | OUTPATIENT
Start: 2025-06-13 | End: 2025-06-17 | Stop reason: HOSPADM

## 2025-06-13 RX ORDER — PANTOPRAZOLE SODIUM 40 MG/1
40 TABLET, DELAYED RELEASE ORAL DAILY
Status: DISCONTINUED | OUTPATIENT
Start: 2025-06-13 | End: 2025-06-17 | Stop reason: HOSPADM

## 2025-06-13 RX ORDER — CALCIUM CARBONATE 200(500)MG
500 TABLET,CHEWABLE ORAL DAILY
Status: DISCONTINUED | OUTPATIENT
Start: 2025-06-13 | End: 2025-06-17 | Stop reason: HOSPADM

## 2025-06-13 RX ORDER — ACETAMINOPHEN 325 MG/1
650 TABLET ORAL EVERY 8 HOURS PRN
Status: DISCONTINUED | OUTPATIENT
Start: 2025-06-13 | End: 2025-06-17 | Stop reason: HOSPADM

## 2025-06-13 RX ORDER — IPRATROPIUM BROMIDE AND ALBUTEROL SULFATE 2.5; .5 MG/3ML; MG/3ML
3 SOLUTION RESPIRATORY (INHALATION) EVERY 6 HOURS PRN
Status: DISCONTINUED | OUTPATIENT
Start: 2025-06-13 | End: 2025-06-17 | Stop reason: HOSPADM

## 2025-06-13 RX ORDER — PROMETHAZINE HYDROCHLORIDE 25 MG/1
25 TABLET ORAL EVERY 6 HOURS PRN
Status: DISCONTINUED | OUTPATIENT
Start: 2025-06-13 | End: 2025-06-17 | Stop reason: HOSPADM

## 2025-06-13 RX ORDER — MYCOPHENOLATE MOFETIL 500 MG/1
1500 TABLET ORAL 2 TIMES DAILY
Status: DISCONTINUED | OUTPATIENT
Start: 2025-06-13 | End: 2025-06-17 | Stop reason: HOSPADM

## 2025-06-13 RX ORDER — TALC
6 POWDER (GRAM) TOPICAL NIGHTLY PRN
Status: DISCONTINUED | OUTPATIENT
Start: 2025-06-13 | End: 2025-06-17 | Stop reason: HOSPADM

## 2025-06-13 RX ORDER — GLUCAGON 1 MG
1 KIT INJECTION
Status: DISCONTINUED | OUTPATIENT
Start: 2025-06-13 | End: 2025-06-17 | Stop reason: HOSPADM

## 2025-06-13 RX ORDER — IBUPROFEN 200 MG
16 TABLET ORAL
Status: DISCONTINUED | OUTPATIENT
Start: 2025-06-13 | End: 2025-06-17 | Stop reason: HOSPADM

## 2025-06-13 RX ORDER — HYDROXYCHLOROQUINE SULFATE 200 MG/1
400 TABLET, FILM COATED ORAL DAILY
Status: DISCONTINUED | OUTPATIENT
Start: 2025-06-13 | End: 2025-06-17 | Stop reason: HOSPADM

## 2025-06-13 RX ORDER — FUROSEMIDE 10 MG/ML
60 INJECTION INTRAMUSCULAR; INTRAVENOUS ONCE
Status: COMPLETED | OUTPATIENT
Start: 2025-06-13 | End: 2025-06-13

## 2025-06-13 RX ORDER — SODIUM CHLORIDE 0.9 % (FLUSH) 0.9 %
10 SYRINGE (ML) INJECTION EVERY 12 HOURS PRN
Status: DISCONTINUED | OUTPATIENT
Start: 2025-06-13 | End: 2025-06-17 | Stop reason: HOSPADM

## 2025-06-13 RX ORDER — SODIUM CHLORIDE 9 MG/ML
INJECTION, SOLUTION INTRAVENOUS CONTINUOUS
Status: ACTIVE | OUTPATIENT
Start: 2025-06-13 | End: 2025-06-13

## 2025-06-13 RX ORDER — NALOXONE HCL 0.4 MG/ML
0.02 VIAL (ML) INJECTION
Status: DISCONTINUED | OUTPATIENT
Start: 2025-06-13 | End: 2025-06-17 | Stop reason: HOSPADM

## 2025-06-13 RX ORDER — LANOLIN ALCOHOL/MO/W.PET/CERES
1 CREAM (GRAM) TOPICAL DAILY
Status: DISCONTINUED | OUTPATIENT
Start: 2025-06-13 | End: 2025-06-17 | Stop reason: HOSPADM

## 2025-06-13 RX ADMIN — CALCIUM GLUCONATE 1 G: 98 INJECTION, SOLUTION INTRAVENOUS at 11:06

## 2025-06-13 RX ADMIN — FERROUS SULFATE TAB 325 MG (65 MG ELEMENTAL FE) 1 EACH: 325 (65 FE) TAB at 07:06

## 2025-06-13 RX ADMIN — FUROSEMIDE 60 MG: 10 INJECTION, SOLUTION INTRAMUSCULAR; INTRAVENOUS at 08:06

## 2025-06-13 RX ADMIN — SODIUM ZIRCONIUM CYCLOSILICATE 10 G: 5 POWDER, FOR SUSPENSION ORAL at 04:06

## 2025-06-13 RX ADMIN — HYDROXYCHLOROQUINE SULFATE 400 MG: 200 TABLET, FILM COATED ORAL at 07:06

## 2025-06-13 RX ADMIN — SODIUM BICARBONATE 1300 MG: 650 TABLET ORAL at 04:06

## 2025-06-13 RX ADMIN — SODIUM BICARBONATE 1300 MG: 650 TABLET ORAL at 08:06

## 2025-06-13 RX ADMIN — SODIUM ZIRCONIUM CYCLOSILICATE 10 G: 5 POWDER, FOR SUSPENSION ORAL at 08:06

## 2025-06-13 RX ADMIN — PANTOPRAZOLE SODIUM 40 MG: 40 TABLET, DELAYED RELEASE ORAL at 07:06

## 2025-06-13 RX ADMIN — HUMAN INSULIN 7.12 UNITS: 100 INJECTION, SOLUTION SUBCUTANEOUS at 11:06

## 2025-06-13 RX ADMIN — SODIUM BICARBONATE 1300 MG: 650 TABLET ORAL at 12:06

## 2025-06-13 RX ADMIN — DEXTROSE MONOHYDRATE 50 G: 25 INJECTION, SOLUTION INTRAVENOUS at 12:06

## 2025-06-13 RX ADMIN — DEXTROSE MONOHYDRATE 50 G: 25 INJECTION, SOLUTION INTRAVENOUS at 11:06

## 2025-06-13 RX ADMIN — MYCOPHENOLATE MOFETIL 1500 MG: 500 TABLET, FILM COATED ORAL at 08:06

## 2025-06-13 RX ADMIN — MYCOPHENOLATE MOFETIL 1500 MG: 500 TABLET, FILM COATED ORAL at 10:06

## 2025-06-13 RX ADMIN — SODIUM ZIRCONIUM CYCLOSILICATE 10 G: 5 POWDER, FOR SUSPENSION ORAL at 07:06

## 2025-06-13 RX ADMIN — PREDNISONE 15 MG: 5 TABLET ORAL at 07:06

## 2025-06-13 RX ADMIN — HUMAN INSULIN 7.12 UNITS: 100 INJECTION, SOLUTION SUBCUTANEOUS at 12:06

## 2025-06-13 RX ADMIN — SODIUM ZIRCONIUM CYCLOSILICATE 10 G: 5 POWDER, FOR SUSPENSION ORAL at 11:06

## 2025-06-13 RX ADMIN — SODIUM CHLORIDE: 9 INJECTION, SOLUTION INTRAVENOUS at 04:06

## 2025-06-13 RX ADMIN — CALCIUM GLUCONATE 1 G: 98 INJECTION, SOLUTION INTRAVENOUS at 12:06

## 2025-06-13 RX ADMIN — CALCIUM CARBONATE (ANTACID) CHEW TAB 500 MG 500 MG: 500 CHEW TAB at 07:06

## 2025-06-13 NOTE — ASSESSMENT & PLAN NOTE
Hyperkalemia is likely due to AMBER.The patients most recent potassium results are listed below.  Recent Labs     06/12/25  1610 06/12/25  2107   K 5.7* 5.7*     Plan  -Monitor for arrhythmias with EKG and/or continuous telemetry.   -Treat the hyperkalemia with fluids and hyperkalemia as needed  -Monitor potassium: Every 12 hours  -The patient's hyperkalemia is currently undergoing medical management

## 2025-06-13 NOTE — ASSESSMENT & PLAN NOTE
Patient recently diagnosed with lupus nephritis back in October 2024.  Has been followed both by Nephrology and Rheumatology outpatient last seen by Dr. Giron from Nephrology on 5/12/25 and Dr. Correia from Rheumatology yesterday with Assessment/Plan noted below.      Dr. 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 He was on MMF, plaquenil and prednisone and recently, started on voclosporin by rheum, but he had stopped all medications for about 6 weeks or so. He is back on everything now for about a week. He feels ok- no evidence of systemic lupus symptoms but UA still with proteinuria and some microscopic hematuria. Also his creatinine is a bit up since last time but still 1.38 mg/dL  Plan:  - DsDNA now negative. Complements normal.   - uric acid is elevated to 8.6  - cont cellcept 1500 mg PO BID, plaquenil 200 mg daily and prednisone 15 mg daily for now  - started on voclosporin with rheumatology in march but has only been on it for about a week  - will see rheum next month- if proteinuria/renal function stable, can consider tapering prednisone to 10 mg daily  - has PCP locally- should follow up in [place] with them too  - will start lisinopril 5 mg daily to help with proteinuria.   - he appears to be progressinly nicely towards remission but I discussed with him the importance of staying on his regimen to completion in order to get him into remission, otherwise likelihood of flare up is high  - no edema noted- stopped furosemide    Dr. Correia from Rheumatology 6/12/2025    Lupus Nephritis Class III/V  Timeline as above in regards to patient's lupus nephritis diagnosis.  Unfortunately, patient had been off therapy for 6 weeks due to nausea/vomiting that he initially thought secondary to his medications. Notes that he will intermittently have nausea even off the medications and was encouraged to restart therapy with Neurology.  Protein/Cr ratio significant with 10g of proteinuria and lower extremity edema. I have counseled and admonished the patient at stopping his medications and informed him to please continue them regardless and inform me if there is any issues.   Plan:  -Check labs to r/o medication side effects CBC, CMP, CRP and Sed Rate.  -JOSEPH, C3/C4 and UA UPCr.  -C/w PDN 15 mg every day, HCQ 400mg every day, MMF 1500mg BID and full dose Voclosporin.  -Discussed adherence with meds and will rx Zofran   -PPI, Ca/ Vit D   -If UPCr still in nephrotic range will start Eliquis  -start Zofran as needed and PPI daily  -referral to GI for nausea and vomiting as this was present before starting therapy and could be secondary to obstruction     Therapeutic drug monitoring   Long-term Plaquenil use  Long-term systemic steroid use  Plan:  -discussed continuing vitamin-D supplementation  -goal to continue to taper steroids as possible, continue PPI  -f/u with Ophthalmology for eye monitoring

## 2025-06-13 NOTE — SUBJECTIVE & OBJECTIVE
Past Medical History:   Diagnosis Date    Marijuana use     Systemic lupus erythematosus, organ or system involvement unspecified        Past Surgical History:   Procedure Laterality Date    NO PAST SURGERIES         Review of patient's allergies indicates:   Allergen Reactions    Sulfa (sulfonamide antibiotics)     Sulfamethoxazole-trimethoprim Other (See Comments)       No current facility-administered medications on file prior to encounter.     Current Outpatient Medications on File Prior to Encounter   Medication Sig    diclofenac sodium (VOLTAREN) 1 % Gel Apply 2 g topically 4 (four) times daily as needed.    ergocalciferol (ERGOCALCIFEROL) 50,000 unit Cap Take 50,000 Units by mouth every 7 days.    ferrous sulfate (FEOSOL) 325 mg (65 mg iron) Tab tablet Take 1 tablet every day by oral route for 90 days.    furosemide (LASIX) 40 MG tablet Take 1 tablet (40 mg total) by mouth once daily.    hydroxychloroquine (PLAQUENIL) 200 mg tablet Take 200 mg by mouth. for 90 days    lisinopriL (PRINIVIL,ZESTRIL) 5 MG tablet Take 5 mg by mouth.    mycophenolate (CELLCEPT) 500 mg Tab Take 1 tablet (500 mg total) by mouth 2 (two) times daily.    ondansetron (ZOFRAN) 4 MG tablet Take 4 mg by mouth.    pantoprazole (PROTONIX) 40 MG tablet Take 1 tablet (40 mg total) by mouth once daily.    predniSONE (DELTASONE) 20 MG tablet Take 1 tablet every day by oral route for 90 days.    voclosporin 7.9 mg Cap Take 23.7 mg by mouth.    calcium carbonate (TUMS) 200 mg calcium (500 mg) chewable tablet Take 500 mg by mouth.    sodium bicarbonate 650 MG tablet Take 2 tablets (1,300 mg total) by mouth 3 (three) times daily.     Family History       Problem Relation (Age of Onset)    Diabetes Brother    Heart failure Brother    Hypertension Father    No Known Problems Mother    Sickle cell trait Sister          Tobacco Use    Smoking status: Former     Types: Cigarettes    Smokeless tobacco: Former   Vaping Use    Vaping status: Never Used    Substance and Sexual Activity    Alcohol use: Not Currently    Drug use: Yes     Types: Marijuana    Sexual activity: Not on file     Review of Systems   All other systems reviewed and are negative.    Objective:     Vital Signs (Most Recent):  Temp: 98.5 °F (36.9 °C) (06/12/25 1957)  Pulse: 84 (06/12/25 2347)  Resp: 18 (06/12/25 1957)  BP: 125/70 (06/12/25 2347)  SpO2: 100 % (06/12/25 2347) Vital Signs (24h Range):  Temp:  [98.5 °F (36.9 °C)] 98.5 °F (36.9 °C)  Pulse:  [77-95] 84  Resp:  [18] 18  SpO2:  [100 %] 100 %  BP: (114-135)/(56-72) 125/70     Weight: 71.2 kg (157 lb)  Body mass index is 23.18 kg/m².     Physical Exam  Vitals reviewed.   Constitutional:       General: He is not in acute distress.     Appearance: Normal appearance. He is normal weight. He is not ill-appearing, toxic-appearing or diaphoretic.   HENT:      Head: Normocephalic and atraumatic.      Right Ear: External ear normal.      Left Ear: External ear normal.      Nose: Nose normal. No congestion or rhinorrhea.      Mouth/Throat:      Mouth: Mucous membranes are moist.      Pharynx: Oropharynx is clear. No oropharyngeal exudate or posterior oropharyngeal erythema.   Eyes:      General: No scleral icterus.     Extraocular Movements: Extraocular movements intact.      Conjunctiva/sclera: Conjunctivae normal.      Pupils: Pupils are equal, round, and reactive to light.   Neck:      Vascular: No carotid bruit.   Cardiovascular:      Rate and Rhythm: Normal rate and regular rhythm.      Pulses: Normal pulses.      Heart sounds: Normal heart sounds. No murmur heard.     No friction rub. No gallop.   Pulmonary:      Effort: Pulmonary effort is normal. No respiratory distress.      Breath sounds: Normal breath sounds. No stridor. No wheezing, rhonchi or rales.   Chest:      Chest wall: No tenderness.   Abdominal:      General: Abdomen is flat. Bowel sounds are normal. There is no distension.      Palpations: Abdomen is soft. There is no mass.       Tenderness: There is no abdominal tenderness. There is no right CVA tenderness, left CVA tenderness, guarding or rebound.      Hernia: No hernia is present.   Musculoskeletal:         General: Swelling present. No tenderness, deformity or signs of injury. Normal range of motion.      Cervical back: Normal range of motion and neck supple. No rigidity or tenderness.      Right lower leg: Edema present.      Left lower leg: Edema present.   Lymphadenopathy:      Cervical: No cervical adenopathy.   Skin:     General: Skin is warm and dry.      Capillary Refill: Capillary refill takes less than 2 seconds.      Coloration: Skin is not jaundiced or pale.      Findings: No bruising, erythema, lesion or rash.   Neurological:      General: No focal deficit present.      Mental Status: He is alert and oriented to person, place, and time. Mental status is at baseline.      Cranial Nerves: No cranial nerve deficit.      Sensory: No sensory deficit.      Motor: No weakness.      Coordination: Coordination normal.   Psychiatric:         Mood and Affect: Mood normal.         Behavior: Behavior normal.         Thought Content: Thought content normal.         Judgment: Judgment normal.              CRANIAL NERVES     CN III, IV, VI   Pupils are equal, round, and reactive to light.       Significant Labs: All pertinent labs within the past 24 hours have been reviewed.    Significant Imaging: I have reviewed all pertinent imaging results/findings within the past 24 hours.    LABS:  Recent Results (from the past 24 hours)   PHOSPHORUS    Collection Time: 06/12/25  4:10 PM   Result Value Ref Range    Phosphorus Level 6.1 (H) 2.5 - 4.7 mg/dL   CBC WITH DIFFERENTIAL    Collection Time: 06/12/25  4:10 PM   Result Value Ref Range    WBC 7.4 4.5 - 11.0 103/uL    RBC 1.99 (L) 4.50 - 5.90 106/uL    Hemoglobin 5.4 (LL) 13.5 - 17.5 gm/dL    Hematocrit 16.7 (L) 40.0 - 51.0 %    MCV 83.8 80.0 - 100.0 fL    MCH 27.3 26.0 - 34.0 pg    MCHC 32.6  31.0 - 37.0 g/dL    RDW 15.7 (H) 11.5 - 14.5 %    Platelet Count 229 130 - 400 103/uL    MPV 7.7 7.4 - 10.4 fL   C-REACTIVE PROTEIN    Collection Time: 06/12/25  4:10 PM   Result Value Ref Range    C-Reactive Protein 0.7 <0.9 mg/dL   COMPREHENSIVE METABOLIC PANEL    Collection Time: 06/12/25  4:10 PM   Result Value Ref Range    Sodium 138 135 - 146 mmol/L    Potassium 5.7 (H) 3.6 - 5.2 mmol/L    Chloride 113 (H) 96 - 110 mmol/L    Carbon Dioxide 17 (L) 24 - 32 mmol/L    Glucose 82 65 - 105 mg/dL    Calcium 7.4 (L) 8.4 - 10.3 mg/dL    BUN 60.0 (H) 7.0 - 25.0 mg/dL    Creatinine 4.27 (H) 0.70 - 1.40 mg/dL    Protein Total 5.0 (L) 6.0 - 8.0 g/dL    Albumin 1.8 (L) 3.4 - 5.0 g/dL    AST 18 <45 U/L    ALT 8 <46 U/L    Alkaline Phosphatase 70 20 - 120 U/L    Total Bilirubin 0.5 <1.3 mg/dL    eGFR 19 (L) >=90 mL/min/1.73m2    Anion Gap 8 8 - 16   Comprehensive metabolic panel    Collection Time: 06/12/25  9:07 PM   Result Value Ref Range    Sodium 136 136 - 145 mmol/L    Potassium 5.7 (H) 3.5 - 5.1 mmol/L    Chloride 113 (H) 95 - 110 mmol/L    CO2 14 (L) 23 - 29 mmol/L    Glucose 82 70 - 110 mg/dL    BUN 62 (H) 6 - 20 mg/dL    Creatinine 4.4 (H) 0.5 - 1.4 mg/dL    Calcium 7.4 (L) 8.7 - 10.5 mg/dL    Protein Total 5.2 (L) 6.0 - 8.4 gm/dL    Albumin 1.1 (L) 3.5 - 5.2 g/dL    Bilirubin Total 0.6 0.1 - 1.0 mg/dL    ALP 78 40 - 150 unit/L    AST 21 11 - 45 unit/L    ALT 6 (L) 10 - 44 unit/L    Anion Gap 9 8 - 16 mmol/L    eGFR 18 (L) >60 mL/min/1.73/m2   CBC with Differential    Collection Time: 06/12/25  9:07 PM   Result Value Ref Range    WBC 6.39 3.90 - 12.70 K/uL    RBC 1.92 (L) 4.60 - 6.20 M/uL    HGB 5.2 (LL) 14.0 - 18.0 gm/dL    HCT 15.8 (LL) 40.0 - 54.0 %    MCV 82 82 - 98 fL    MCH 27.1 27.0 - 31.0 pg    MCHC 32.9 32.0 - 36.0 g/dL    RDW 15.9 (H) 11.5 - 14.5 %    Platelet Count 209 150 - 450 K/uL    MPV 9.6 9.2 - 12.9 fL    Nucleated RBC 0 <=0 /100 WBC    Neut % 70.6 38 - 73 %    Lymph % 20.2 18 - 48 %    Mono % 8.3 4  - 15 %    Eos % 0.3 <=8 %    Basophil % 0.0 <=1.9 %    Imm Grans % 0.6 (H) 0.0 - 0.5 %    Neut # 4.51 1.8 - 7.7 K/uL    Lymph # 1.29 1 - 4.8 K/uL    Mono # 0.53 0.3 - 1 K/uL    Eos # 0.02 <=0.5 K/uL    Baso # 0.00 <=0.2 K/uL    Imm Grans # 0.04 0.00 - 0.04 K/uL   Troponin I    Collection Time: 06/12/25  9:07 PM   Result Value Ref Range    Troponin-I 0.012 <=0.026 ng/mL   Brain natriuretic peptide    Collection Time: 06/12/25  9:07 PM   Result Value Ref Range    BNP 11 0 - 99 pg/mL       RADIOLOGY  X-Ray Chest AP Portable  Result Date: 6/12/2025  EXAM:  XR CHEST AP PORTABLE CLINICAL HISTORY:  [R06.02]-Shortness of breath. TECHNIQUE: AP chest x-ray one view performed FINDINGS: The cardiomediastinal silhouette is within normal limits for AP technique. The lungs appear clear of active disease. No acute infiltrates identified. No pneumothorax or pleural effusion identified.      No acute findings. Finalized on: 6/12/2025 9:10 PM By:  Santo Sanchez MD [facility]# [id number]      2025-06-12 21:12:[id number]     [facility]      EKG    MICROBIOLOGY    MDM

## 2025-06-13 NOTE — FIRST PROVIDER EVALUATION
"Medical screening examination initiated.  I have conducted a focused provider triage encounter, findings are as follows:    Brief history of present illness:  26 yo male with PMHx significant of SLE presenting to the ED with complaints for low H&H. Associated sx include SOB and chest pain. Seen by rheumatologist today; hemoglobin 5.4. Set to ED for transfusion. Denies fever, chills, melena, BRRPR, lightheadedness and syncope.     Vitals:    06/12/25 1957   BP: 126/72   BP Location: Right arm   Pulse: 95   Resp: 18   Temp: 98.5 °F (36.9 °C)   TempSrc: Oral   SpO2: 100%   Weight: 71.2 kg (157 lb)   Height: 5' 9" (1.753 m)       Pertinent physical exam:  VSS. NAD.     Brief workup plan:  Preliminary workup initiated; this workup will be continued and followed by the physician or advanced practice provider that is assigned to the patient when roomed.  "

## 2025-06-13 NOTE — ED PROVIDER NOTES
SCRIBE #1 NOTE: I, Negro Schaeffer, am scribing for, and in the presence of, René Lewis Jr., MD. I have scribed the entire note.       History     Chief Complaint   Patient presents with    Abnormal Lab     Seen by rheumatology today, told abnormal hgb of 5, sent for transfusion.      Review of patient's allergies indicates:   Allergen Reactions    Sulfa (sulfonamide antibiotics)     Sulfamethoxazole-trimethoprim Other (See Comments)         History of Present Illness     HPI    6/12/2025, 9:14 PM  History obtained from the patient      History of Present Illness: Maikel Deleon is a 25 y.o. male patient with a PMHx of lupus who presents to the Emergency Department for evaluation of low hgb (5.4) from his lab draw earlier today. He reports he saw his Rheumatologist today for labs and was advised to come to the ED for transfusion. Patient denies any blood in his stool, hematemesis, N/V, CP, SOB, any blood thinner use, and all other sxs at this time. He notes he was started on iron supplements. No further complaints or concerns at this time.       Arrival mode: Personal vehicle    PCP: No, Primary Doctor        Past Medical History:  Past Medical History:   Diagnosis Date    Marijuana use     Systemic lupus erythematosus, organ or system involvement unspecified        Past Surgical History:  Past Surgical History:   Procedure Laterality Date    NO PAST SURGERIES           Family History:  Family History   Problem Relation Name Age of Onset    No Known Problems Mother      Hypertension Father      Sickle cell trait Sister      Heart failure Brother      Diabetes Brother         Social History:  Social History     Tobacco Use    Smoking status: Former     Types: Cigarettes    Smokeless tobacco: Former   Vaping Use    Vaping status: Never Used   Substance and Sexual Activity    Alcohol use: Not Currently    Drug use: Yes     Types: Marijuana    Sexual activity: Not on file        Review of Systems     Review of  Systems   Constitutional:  Negative for fever.   HENT:  Negative for sore throat.    Respiratory:  Negative for shortness of breath.    Cardiovascular:  Negative for chest pain.   Gastrointestinal:  Negative for nausea.   Genitourinary:  Negative for dysuria.   Musculoskeletal:  Negative for back pain.   Skin:  Negative for rash.   Neurological:  Negative for weakness.   Hematological:  Does not bruise/bleed easily.   All other systems reviewed and are negative.       Physical Exam     Initial Vitals [06/12/25 1957]   BP Pulse Resp Temp SpO2   126/72 95 18 98.5 °F (36.9 °C) 100 %      MAP       --          Physical Exam  Nursing Notes and Vital Signs Reviewed.  Constitutional: Patient is in no acute distress. Well-developed and well-nourished.  Head: Atraumatic. Normocephalic.  Eyes:  EOM intact.  No scleral icterus.  ENT: Mucous membranes are moist.  Nares clear   Neck:  Full ROM. No JVD.  Cardiovascular: Regular rate. Regular rhythm No murmurs, rubs, or gallops. Distal pulses are 2+ and symmetric  Pulmonary/Chest: No respiratory distress. Clear to auscultation bilaterally. No wheezing or rales.  Equal chest wall rise bilaterally  Abdominal: Soft and non-distended.  There is no tenderness.  No rebound, guarding, or rigidity. Good bowel sounds.  Rectal exam shows normal tone.  Brown stool.  Genitourinary: No CVA tenderness.  No suprapubic tenderness  Musculoskeletal: Moves all extremities. No obvious deformities.  5 x 5 strength in all extremities   Skin: Warm and dry.  Neurological:  Alert, awake, and appropriate.  Normal speech.  No acute focal neurological deficits are appreciated.  Two through 12 intact bilaterally.  Psychiatric: Normal affect. Good eye contact. Appropriate in content.     ED Course   Critical Care    Date/Time: 6/13/2025 12:09 AM    Performed by: René Lewis Jr., MD  Authorized by: René Lewis Jr., MD  Direct patient critical care time: 30 minutes  Additional history critical care  "time: 10 minutes  Ordering / reviewing critical care time: 5 minutes  Documentation critical care time: 5 minutes  Consulting other physicians critical care time: 5 minutes  Total critical care time (exclusive of procedural time) : 55 minutes  Critical care time was exclusive of separately billable procedures and treating other patients and teaching time.  Critical care was necessary to treat or prevent imminent or life-threatening deterioration of the following conditions: symptomatic anemia requiring transfusion.  Critical care was time spent personally by me on the following activities: blood draw for specimens, development of treatment plan with patient or surrogate, interpretation of cardiac output measurements, evaluation of patient's response to treatment, examination of patient, obtaining history from patient or surrogate, ordering and performing treatments and interventions, ordering and review of radiographic studies, ordering and review of laboratory studies, pulse oximetry, re-evaluation of patient's condition, review of old charts and discussions with consultants.        ED Vital Signs:  Vitals:    06/12/25 1957 06/12/25 2122 06/12/25 2132 06/12/25 2147   BP: 126/72 135/65 132/68 133/71   Pulse: 95 84 80 77   Resp: 18      Temp: 98.5 °F (36.9 °C)      TempSrc: Oral      SpO2: 100% 100% 100% 100%   Weight: 71.2 kg (157 lb)      Height: 5' 9" (1.753 m)       06/12/25 2202 06/12/25 2217 06/12/25 2232 06/12/25 2247   BP: (!) 118/56 125/65 116/63 116/60   Pulse: 83 80 80 80   Resp:       Temp:       TempSrc:       SpO2: 100% 100% 100% 100%   Weight:       Height:        06/12/25 2302 06/12/25 2317 06/12/25 2333 06/12/25 2347   BP: (!) 116/56 (!) 114/57 (!) 117/59 125/70   Pulse: 79 80 82 84   Resp:       Temp:       TempSrc:       SpO2: 100% 100% 100% 100%   Weight:       Height:        06/13/25 0007   BP: 129/70   Pulse: 91   Resp:    Temp:    TempSrc:    SpO2: 100%   Weight:    Height:        Abnormal Lab " Results:  Labs Reviewed   COMPREHENSIVE METABOLIC PANEL - Abnormal       Result Value    Sodium 136      Potassium 5.7 (*)     Chloride 113 (*)     CO2 14 (*)     Glucose 82      BUN 62 (*)     Creatinine 4.4 (*)     Calcium 7.4 (*)     Protein Total 5.2 (*)     Albumin 1.1 (*)     Bilirubin Total 0.6      ALP 78      AST 21      ALT 6 (*)     Anion Gap 9      eGFR 18 (*)    CBC WITH DIFFERENTIAL - Abnormal    WBC 6.39      RBC 1.92 (*)     HGB 5.2 (*)     HCT 15.8 (*)     MCV 82      MCH 27.1      MCHC 32.9      RDW 15.9 (*)     Platelet Count 209      MPV 9.6      Nucleated RBC 0      Neut % 70.6      Lymph % 20.2      Mono % 8.3      Eos % 0.3      Basophil % 0.0      Imm Grans % 0.6 (*)     Neut # 4.51      Lymph # 1.29      Mono # 0.53      Eos # 0.02      Baso # 0.00      Imm Grans # 0.04     TROPONIN I - Normal    Troponin-I 0.012     B-TYPE NATRIURETIC PEPTIDE - Normal    BNP 11     CBC W/ AUTO DIFFERENTIAL    Narrative:     The following orders were created for panel order CBC auto differential.  Procedure                               Abnormality         Status                     ---------                               -----------         ------                     CBC with Differential[[id number]]       Abnormal            Final result                 Please view results for these tests on the individual orders.   TYPE & SCREEN   PREPARE RBC SOFT        All Lab Results:  Results for orders placed or performed during the hospital encounter of 06/12/25   Comprehensive metabolic panel    Collection Time: 06/12/25  9:07 PM   Result Value Ref Range    Sodium 136 136 - 145 mmol/L    Potassium 5.7 (H) 3.5 - 5.1 mmol/L    Chloride 113 (H) 95 - 110 mmol/L    CO2 14 (L) 23 - 29 mmol/L    Glucose 82 70 - 110 mg/dL    BUN 62 (H) 6 - 20 mg/dL    Creatinine 4.4 (H) 0.5 - 1.4 mg/dL    Calcium 7.4 (L) 8.7 - 10.5 mg/dL    Protein Total 5.2 (L) 6.0 - 8.4 gm/dL    Albumin 1.1 (L) 3.5 - 5.2 g/dL    Bilirubin Total 0.6 0.1 -  1.0 mg/dL    ALP 78 40 - 150 unit/L    AST 21 11 - 45 unit/L    ALT 6 (L) 10 - 44 unit/L    Anion Gap 9 8 - 16 mmol/L    eGFR 18 (L) >60 mL/min/1.73/m2   CBC with Differential    Collection Time: 06/12/25  9:07 PM   Result Value Ref Range    WBC 6.39 3.90 - 12.70 K/uL    RBC 1.92 (L) 4.60 - 6.20 M/uL    HGB 5.2 (LL) 14.0 - 18.0 gm/dL    HCT 15.8 (LL) 40.0 - 54.0 %    MCV 82 82 - 98 fL    MCH 27.1 27.0 - 31.0 pg    MCHC 32.9 32.0 - 36.0 g/dL    RDW 15.9 (H) 11.5 - 14.5 %    Platelet Count 209 150 - 450 K/uL    MPV 9.6 9.2 - 12.9 fL    Nucleated RBC 0 <=0 /100 WBC    Neut % 70.6 38 - 73 %    Lymph % 20.2 18 - 48 %    Mono % 8.3 4 - 15 %    Eos % 0.3 <=8 %    Basophil % 0.0 <=1.9 %    Imm Grans % 0.6 (H) 0.0 - 0.5 %    Neut # 4.51 1.8 - 7.7 K/uL    Lymph # 1.29 1 - 4.8 K/uL    Mono # 0.53 0.3 - 1 K/uL    Eos # 0.02 <=0.5 K/uL    Baso # 0.00 <=0.2 K/uL    Imm Grans # 0.04 0.00 - 0.04 K/uL   Troponin I    Collection Time: 06/12/25  9:07 PM   Result Value Ref Range    Troponin-I 0.012 <=0.026 ng/mL   Brain natriuretic peptide    Collection Time: 06/12/25  9:07 PM   Result Value Ref Range    BNP 11 0 - 99 pg/mL         Imaging Results:  Imaging Results              X-Ray Chest AP Portable (Final result)  Result time 06/12/25 21:10:04      Final result by Santo Sanchez III, MD (06/12/25 21:10:04)                   Impression:     No acute findings.    Finalized on: 6/12/2025 9:10 PM By:  Santo Sanchez MD  [facility]# [id number]      2025-06-12 21:12:[id number]     [facility]               Narrative:      EXAM:  XR CHEST AP PORTABLE    CLINICAL HISTORY:  [R06.02]-Shortness of breath.    TECHNIQUE: AP chest x-ray one view performed    FINDINGS: The cardiomediastinal silhouette is within normal limits for AP technique. The lungs appear clear of active disease. No acute infiltrates identified. No pneumothorax or pleural effusion identified.                                         The EKG was ordered, reviewed, and independently interpreted  by the ED provider.  Interpretation time: 20:53  Rate: 88 BPM  Rhythm: normal sinus rhythm  Interpretation: No acute ST changes. No STEMI.    The Emergency Provider reviewed the vital signs and test results, which are outlined above.     ED Discussion            Medical Decision Making  Differential diagnosis: Anemia, symptomatic anemia, iron-deficiency anemia, GI bleeding    The patient is evaluated with the history physical examination along with chart review.  That has referred for a hemoglobin of 5.2.  The patient has denies any blood in his stool.  The patient has was found to have significantly worsened creatinine at 4.4 as well.  Blood has been ordered that has received fluids is being admitted to Hospital Medicine further evaluation and treatment    Amount and/or Complexity of Data Reviewed  Independent Historian: parent  External Data Reviewed: labs and notes.  Labs: ordered. Decision-making details documented in ED Course.     Details: The patient is hemoglobin in his 5.2.  MCV is 82.  Patient has a BUN is 62 and creatinine of 4.4.  Troponin BNP are normal.  Radiology: ordered. Decision-making details documented in ED Course.     Details: Chest x-ray shows no acute findings  ECG/medicine tests: ordered and independent interpretation performed. Decision-making details documented in ED Course.     Details: No STEMI  Discussion of management or test interpretation with external provider(s):     12:09 AM: Discussed case with Joni Vo MD ([facility]). Dr. Vo agrees with current care and management of pt and accepts admission.   Admitting Service: [facility]  Admitting Physician: Dr. Vo  Admit to: Inpatient med tele    12:09 AM: Re-evaluated pt. I have discussed test results, shared treatment plan, and the need for admission with patient and family at bedside. Pt and family express understanding at this time and agree with all information. All questions answered. Pt and family  have no further questions or concerns at this time. Pt is ready for admit.    Risk  OTC drugs.  Prescription drug management.  Drug therapy requiring intensive monitoring for toxicity.  Decision regarding hospitalization.    Critical Care  Total time providing critical care: 55 minutes                 ED Medication(s):  Medications   0.9%  NaCl infusion (for blood administration) (has no administration in time range)       New Prescriptions    No medications on file        Follow-up Information       PCP.                                 Scribe Attestation:   Scribe #1: I performed the above scribed service and the documentation accurately describes the services I performed. I attest to the accuracy of the note.     Attending:   Physician Attestation Statement for Scribe #1: I, René Lewis Jr., MD, personally performed the services described in this documentation, as scribed by Negro Schaeffer, in my presence, and it is both accurate and complete.           Clinical Impression       ICD-10-CM ICD-9-CM   1. Iron deficiency anemia, unspecified iron deficiency anemia type  D50.9 280.9   2. Shortness of breath  R06.02 786.05   3. Acute renal failure superimposed on chronic kidney disease, unspecified acute renal failure type, unspecified CKD stage  N17.9 584.9    N18.9 585.9       Disposition:   Disposition: Admitted  Condition: Stable       René Lewis Jr., MD  06/13/25 0016

## 2025-06-13 NOTE — DISCHARGE INSTRUCTIONS
Continue all your home iron pills.  Follow up with her doctor in 1-2 days for re-evaluation.  If you experience any rectal bleeding or blood in his stool return immediately for re-evaluation

## 2025-06-13 NOTE — ASSESSMENT & PLAN NOTE
Chronic, controlled.  Latest blood pressure and vitals reviewed-   Temp:  [98.3 °F (36.8 °C)-98.6 °F (37 °C)]   Pulse:  [77-95]   Resp:  [18]   BP: (107-137)/(52-73)   SpO2:  [100 %] .   Home meds for hypertension were reviewed and noted below.   Hypertension Medications              furosemide (LASIX) 40 MG tablet Take 1 tablet (40 mg total) by mouth once daily.    lisinopriL (PRINIVIL,ZESTRIL) 5 MG tablet Take 5 mg by mouth.     While in the hospital, will manage blood pressure as follows; Continue home antihypertensive regimen    Will utilize p.r.n. blood pressure medication only if patient's blood pressure greater than  180/110 and he develops symptoms such as worsening chest pain or shortness of breath.

## 2025-06-13 NOTE — ASSESSMENT & PLAN NOTE
Baseline creatinine is 1.38. Most recent creatinine and eGFR are listed below.  Recent Labs     06/12/25  1610 06/12/25  2107   CREATININE 4.27* 4.4*   EGFRNORACEVR 19* 18*     Plan  -AMBER is currently undergoing medical management  -Avoid nephrotoxins and renally dose meds for GFR listed above  -Monitor urine output, serial BMP, and adjust therapy as needed  -f/u nephrology

## 2025-06-13 NOTE — H&P
[facility] - Emergency Dept.  [facility] Medicine  History & Physical    Patient Name: Maikel Deleon  MRN: [id number]  Patient Class: IP- Inpatient  Admission Date: 6/12/2025  Attending Physician: René Lewis Jr., MD   Primary Care Provider: Elaina Primary Doctor         Patient information was obtained from patient, past medical records, and ER records.     Subjective:     Principal Problem:Acute kidney injury superimposed on chronic kidney disease    Chief Complaint:   Chief Complaint   Patient presents with    Abnormal Lab     Seen by rheumatology today, told abnormal hgb of 5, sent for transfusion.         HPI: Maikel Deleon is a 25 y.o. male with a PMH  has a past medical history of Marijuana use and Systemic lupus erythematosus, organ or system involvement unspecified. who presented to the ED directed by his rheumatologist after outpatient labs revealed significant anemia and AMBER.  Patient with recently diagnosed with lupus nephritis back in October 2024 in his been noncompliant with home medications.  Patient was last seen by Dr. Giron from Nephrology on 5/12/25 and was reported to be progressing nicely towards remission and stressed importance of adherence and Dr. Correia from Rheumatology yesterday who documented patient was more adherent to his medications however, patient reported to me he has not taken any of his medications since June 5 secondary to potential side affects.  Patient main complaint includes increased tiredness/fatigue as well as lower extremity swelling but reported all other review of systems negative except as noted above including negative epistaxis, hemoptysis, hematemesis, hematuria, melena, or hematochezia.  Initial workup in the ED revealed patient to be afebrile without leukocytosis, hemodynamically stable, H/H 5.2/15.8, potassium 5.7, BUN 60, creatinine/GFR 4.4/18, calcium 7.4, phosphorus 6.1, and chest x-ray negative for acute findings.  Patient admitted to  Hospital Medicine inpatient for continued medical management.  Nephrology consulted and awaiting further evaluation/recommendations.    PCP: No, Primary Doctor      Past Medical History:   Diagnosis Date    Marijuana use     Systemic lupus erythematosus, organ or system involvement unspecified        Past Surgical History:   Procedure Laterality Date    NO PAST SURGERIES         Review of patient's allergies indicates:   Allergen Reactions    Sulfa (sulfonamide antibiotics)     Sulfamethoxazole-trimethoprim Other (See Comments)       No current facility-administered medications on file prior to encounter.     Current Outpatient Medications on File Prior to Encounter   Medication Sig    diclofenac sodium (VOLTAREN) 1 % Gel Apply 2 g topically 4 (four) times daily as needed.    ergocalciferol (ERGOCALCIFEROL) 50,000 unit Cap Take 50,000 Units by mouth every 7 days.    ferrous sulfate (FEOSOL) 325 mg (65 mg iron) Tab tablet Take 1 tablet every day by oral route for 90 days.    furosemide (LASIX) 40 MG tablet Take 1 tablet (40 mg total) by mouth once daily.    hydroxychloroquine (PLAQUENIL) 200 mg tablet Take 200 mg by mouth. for 90 days    lisinopriL (PRINIVIL,ZESTRIL) 5 MG tablet Take 5 mg by mouth.    mycophenolate (CELLCEPT) 500 mg Tab Take 1 tablet (500 mg total) by mouth 2 (two) times daily.    ondansetron (ZOFRAN) 4 MG tablet Take 4 mg by mouth.    pantoprazole (PROTONIX) 40 MG tablet Take 1 tablet (40 mg total) by mouth once daily.    predniSONE (DELTASONE) 20 MG tablet Take 1 tablet every day by oral route for 90 days.    voclosporin 7.9 mg Cap Take 23.7 mg by mouth.    calcium carbonate (TUMS) 200 mg calcium (500 mg) chewable tablet Take 500 mg by mouth.    sodium bicarbonate 650 MG tablet Take 2 tablets (1,300 mg total) by mouth 3 (three) times daily.     Family History       Problem Relation (Age of Onset)    Diabetes Brother    Heart failure Brother    Hypertension Father    No Known Problems Mother     Sickle cell trait Sister          Tobacco Use    Smoking status: Former     Types: Cigarettes    Smokeless tobacco: Former   Vaping Use    Vaping status: Never Used   Substance and Sexual Activity    Alcohol use: Not Currently    Drug use: Yes     Types: Marijuana    Sexual activity: Not on file     Review of Systems   All other systems reviewed and are negative.    Objective:     Vital Signs (Most Recent):  Temp: 98.5 °F (36.9 °C) (06/12/25 1957)  Pulse: 84 (06/12/25 2347)  Resp: 18 (06/12/25 1957)  BP: 125/70 (06/12/25 2347)  SpO2: 100 % (06/12/25 2347) Vital Signs (24h Range):  Temp:  [98.5 °F (36.9 °C)] 98.5 °F (36.9 °C)  Pulse:  [77-95] 84  Resp:  [18] 18  SpO2:  [100 %] 100 %  BP: (114-135)/(56-72) 125/70     Weight: 71.2 kg (157 lb)  Body mass index is 23.18 kg/m².     Physical Exam  Vitals reviewed.   Constitutional:       General: He is not in acute distress.     Appearance: Normal appearance. He is normal weight. He is not ill-appearing, toxic-appearing or diaphoretic.   HENT:      Head: Normocephalic and atraumatic.      Right Ear: External ear normal.      Left Ear: External ear normal.      Nose: Nose normal. No congestion or rhinorrhea.      Mouth/Throat:      Mouth: Mucous membranes are moist.      Pharynx: Oropharynx is clear. No oropharyngeal exudate or posterior oropharyngeal erythema.   Eyes:      General: No scleral icterus.     Extraocular Movements: Extraocular movements intact.      Conjunctiva/sclera: Conjunctivae normal.      Pupils: Pupils are equal, round, and reactive to light.   Neck:      Vascular: No carotid bruit.   Cardiovascular:      Rate and Rhythm: Normal rate and regular rhythm.      Pulses: Normal pulses.      Heart sounds: Normal heart sounds. No murmur heard.     No friction rub. No gallop.   Pulmonary:      Effort: Pulmonary effort is normal. No respiratory distress.      Breath sounds: Normal breath sounds. No stridor. No wheezing, rhonchi or rales.   Chest:      Chest  wall: No tenderness.   Abdominal:      General: Abdomen is flat. Bowel sounds are normal. There is no distension.      Palpations: Abdomen is soft. There is no mass.      Tenderness: There is no abdominal tenderness. There is no right CVA tenderness, left CVA tenderness, guarding or rebound.      Hernia: No hernia is present.   Musculoskeletal:         General: Swelling present. No tenderness, deformity or signs of injury. Normal range of motion.      Cervical back: Normal range of motion and neck supple. No rigidity or tenderness.      Right lower leg: Edema present.      Left lower leg: Edema present.   Lymphadenopathy:      Cervical: No cervical adenopathy.   Skin:     General: Skin is warm and dry.      Capillary Refill: Capillary refill takes less than 2 seconds.      Coloration: Skin is not jaundiced or pale.      Findings: No bruising, erythema, lesion or rash.   Neurological:      General: No focal deficit present.      Mental Status: He is alert and oriented to person, place, and time. Mental status is at baseline.      Cranial Nerves: No cranial nerve deficit.      Sensory: No sensory deficit.      Motor: No weakness.      Coordination: Coordination normal.   Psychiatric:         Mood and Affect: Mood normal.         Behavior: Behavior normal.         Thought Content: Thought content normal.         Judgment: Judgment normal.              CRANIAL NERVES     CN III, IV, VI   Pupils are equal, round, and reactive to light.       Significant Labs: All pertinent labs within the past 24 hours have been reviewed.    Significant Imaging: I have reviewed all pertinent imaging results/findings within the past 24 hours.    LABS:  Recent Results (from the past 24 hours)   PHOSPHORUS    Collection Time: 06/12/25  4:10 PM   Result Value Ref Range    Phosphorus Level 6.1 (H) 2.5 - 4.7 mg/dL   CBC WITH DIFFERENTIAL    Collection Time: 06/12/25  4:10 PM   Result Value Ref Range    WBC 7.4 4.5 - 11.0 103/uL    RBC 1.99  (L) 4.50 - 5.90 106/uL    Hemoglobin 5.4 (LL) 13.5 - 17.5 gm/dL    Hematocrit 16.7 (L) 40.0 - 51.0 %    MCV 83.8 80.0 - 100.0 fL    MCH 27.3 26.0 - 34.0 pg    MCHC 32.6 31.0 - 37.0 g/dL    RDW 15.7 (H) 11.5 - 14.5 %    Platelet Count 229 130 - 400 103/uL    MPV 7.7 7.4 - 10.4 fL   C-REACTIVE PROTEIN    Collection Time: 06/12/25  4:10 PM   Result Value Ref Range    C-Reactive Protein 0.7 <0.9 mg/dL   COMPREHENSIVE METABOLIC PANEL    Collection Time: 06/12/25  4:10 PM   Result Value Ref Range    Sodium 138 135 - 146 mmol/L    Potassium 5.7 (H) 3.6 - 5.2 mmol/L    Chloride 113 (H) 96 - 110 mmol/L    Carbon Dioxide 17 (L) 24 - 32 mmol/L    Glucose 82 65 - 105 mg/dL    Calcium 7.4 (L) 8.4 - 10.3 mg/dL    BUN 60.0 (H) 7.0 - 25.0 mg/dL    Creatinine 4.27 (H) 0.70 - 1.40 mg/dL    Protein Total 5.0 (L) 6.0 - 8.0 g/dL    Albumin 1.8 (L) 3.4 - 5.0 g/dL    AST 18 <45 U/L    ALT 8 <46 U/L    Alkaline Phosphatase 70 20 - 120 U/L    Total Bilirubin 0.5 <1.3 mg/dL    eGFR 19 (L) >=90 mL/min/1.73m2    Anion Gap 8 8 - 16   Comprehensive metabolic panel    Collection Time: 06/12/25  9:07 PM   Result Value Ref Range    Sodium 136 136 - 145 mmol/L    Potassium 5.7 (H) 3.5 - 5.1 mmol/L    Chloride 113 (H) 95 - 110 mmol/L    CO2 14 (L) 23 - 29 mmol/L    Glucose 82 70 - 110 mg/dL    BUN 62 (H) 6 - 20 mg/dL    Creatinine 4.4 (H) 0.5 - 1.4 mg/dL    Calcium 7.4 (L) 8.7 - 10.5 mg/dL    Protein Total 5.2 (L) 6.0 - 8.4 gm/dL    Albumin 1.1 (L) 3.5 - 5.2 g/dL    Bilirubin Total 0.6 0.1 - 1.0 mg/dL    ALP 78 40 - 150 unit/L    AST 21 11 - 45 unit/L    ALT 6 (L) 10 - 44 unit/L    Anion Gap 9 8 - 16 mmol/L    eGFR 18 (L) >60 mL/min/1.73/m2   CBC with Differential    Collection Time: 06/12/25  9:07 PM   Result Value Ref Range    WBC 6.39 3.90 - 12.70 K/uL    RBC 1.92 (L) 4.60 - 6.20 M/uL    HGB 5.2 (LL) 14.0 - 18.0 gm/dL    HCT 15.8 (LL) 40.0 - 54.0 %    MCV 82 82 - 98 fL    MCH 27.1 27.0 - 31.0 pg    MCHC 32.9 32.0 - 36.0 g/dL    RDW 15.9 (H) 11.5  - 14.5 %    Platelet Count 209 150 - 450 K/uL    MPV 9.6 9.2 - 12.9 fL    Nucleated RBC 0 <=0 /100 WBC    Neut % 70.6 38 - 73 %    Lymph % 20.2 18 - 48 %    Mono % 8.3 4 - 15 %    Eos % 0.3 <=8 %    Basophil % 0.0 <=1.9 %    Imm Grans % 0.6 (H) 0.0 - 0.5 %    Neut # 4.51 1.8 - 7.7 K/uL    Lymph # 1.29 1 - 4.8 K/uL    Mono # 0.53 0.3 - 1 K/uL    Eos # 0.02 <=0.5 K/uL    Baso # 0.00 <=0.2 K/uL    Imm Grans # 0.04 0.00 - 0.04 K/uL   Troponin I    Collection Time: 06/12/25  9:07 PM   Result Value Ref Range    Troponin-I 0.012 <=0.026 ng/mL   Brain natriuretic peptide    Collection Time: 06/12/25  9:07 PM   Result Value Ref Range    BNP 11 0 - 99 pg/mL       RADIOLOGY  X-Ray Chest AP Portable  Result Date: 6/12/2025  EXAM:  XR CHEST AP PORTABLE CLINICAL HISTORY:  [R06.02]-Shortness of breath. TECHNIQUE: AP chest x-ray one view performed FINDINGS: The cardiomediastinal silhouette is within normal limits for AP technique. The lungs appear clear of active disease. No acute infiltrates identified. No pneumothorax or pleural effusion identified.      No acute findings. Finalized on: 6/12/2025 9:10 PM By:  Santo Sanchez MD [facility]# [id number]      2025-06-12 21:12:[id number]     [facility]      EKG    MICROBIOLOGY    MDM    Assessment/Plan:     Assessment & Plan  Acute kidney injury superimposed on chronic kidney disease  Baseline creatinine is 1.38. Most recent creatinine and eGFR are listed below.  Recent Labs     06/12/25  1610 06/12/25  2107   CREATININE 4.27* 4.4*   EGFRNORACEVR 19* 18*     Plan  -AMBER is currently undergoing medical management  -Avoid nephrotoxins and renally dose meds for GFR listed above  -Monitor urine output, serial BMP, and adjust therapy as needed  -f/u nephrology     Hyperkalemia  Hyperkalemia is likely due to AMBER.The patients most recent potassium results are listed below.  Recent Labs     06/12/25  1610 06/12/25  2107   K 5.7* 5.7*     Plan  -Monitor for arrhythmias with EKG and/or continuous telemetry.    -Treat the hyperkalemia with fluids and hyperkalemia as needed  -Monitor potassium: Every 12 hours  -The patient's hyperkalemia is currently undergoing medical management    Lupus nephritis  Patient recently diagnosed with lupus nephritis back in October 2024.  Has been followed both by Nephrology and Rheumatology outpatient last seen by Dr. Giron from Nephrology on 5/12/25 and Dr. Correia from Rheumatology yesterday with Assessment/Plan noted below.      Dr. 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 He was on MMF, plaquenil and prednisone and recently, started on voclosporin by rheum, but he had stopped all medications for about 6 weeks or so. He is back on everything now for about a week. He feels ok- no evidence of systemic lupus symptoms but UA still with proteinuria and some microscopic hematuria. Also his creatinine is a bit up since last time but still 1.38 mg/dL  Plan:  - DsDNA now negative. Complements normal.   - uric acid is elevated to 8.6  - cont cellcept 1500 mg PO BID, plaquenil 200 mg daily and prednisone 15 mg daily for now  - started on voclosporin with rheumatology in march but has only been on it for about a week  - will see rheum next month- if proteinuria/renal function stable, can consider tapering prednisone to 10 mg daily  - has PCP locally- should follow up in [place] with them too  - will start lisinopril 5 mg daily to help with proteinuria.   - he appears to be progressinly nicely towards remission but I discussed with him the importance of staying on his regimen to completion in order to get him into remission, otherwise likelihood of flare up is high  - no edema noted- stopped furosemide    Dr. Correia from Rheumatology 6/12/2025    Lupus Nephritis Class III/V  Timeline as above in regards to patient's lupus nephritis diagnosis.  Unfortunately, patient had been off therapy for 6 weeks due to  nausea/vomiting that he initially thought secondary to his medications. Notes that he will intermittently have nausea even off the medications and was encouraged to restart therapy with Neurology. Protein/Cr ratio significant with 10g of proteinuria and lower extremity edema. I have counseled and admonished the patient at stopping his medications and informed him to please continue them regardless and inform me if there is any issues.   Plan:  -Check labs to r/o medication side effects CBC, CMP, CRP and Sed Rate.  -JOSEPH, C3/C4 and UA UPCr.  -C/w PDN 15 mg every day, HCQ 400mg every day, MMF 1500mg BID and full dose Voclosporin.  -Discussed adherence with meds and will rx Zofran   -PPI, Ca/ Vit D   -If UPCr still in nephrotic range will start Eliquis  -start Zofran as needed and PPI daily  -referral to GI for nausea and vomiting as this was present before starting therapy and could be secondary to obstruction     Therapeutic drug monitoring   Long-term Plaquenil use  Long-term systemic steroid use  Plan:  -discussed continuing vitamin-D supplementation  -goal to continue to taper steroids as possible, continue PPI  -f/u with Ophthalmology for eye monitoring    Anemia due to chronic kidney disease  Anemia is likely due to chronic disease due to lupus nephritis. Most recent hemoglobin and hematocrit are listed below.  Recent Labs     06/12/25  1610 06/12/25  2107   HGB 5.4* 5.2*   HCT 16.7* 15.8*     Plan  -Monitor serial CBC: Every 12 hours  -Transfuse PRBC if patient becomes hemodynamically unstable, symptomatic or H/H drops below 7/21.  -Patient has not received any PRBC transfusions to date however, pending 2u pRBCs upon admission  -Patient's anemia is currently undergoing medical management  -f/u post-transfusion labs     Hypertension  Chronic, controlled.  Latest blood pressure and vitals reviewed-   Temp:  [98.3 °F (36.8 °C)-98.6 °F (37 °C)]   Pulse:  [77-95]   Resp:  [18]   BP: (107-137)/(52-73)   SpO2:  [100 %]  .   Home meds for hypertension were reviewed and noted below.   Hypertension Medications              furosemide (LASIX) 40 MG tablet Take 1 tablet (40 mg total) by mouth once daily.    lisinopriL (PRINIVIL,ZESTRIL) 5 MG tablet Take 5 mg by mouth.     While in the hospital, will manage blood pressure as follows; Continue home antihypertensive regimen    Will utilize p.r.n. blood pressure medication only if patient's blood pressure greater than  180/110 and he develops symptoms such as worsening chest pain or shortness of breath.      VTE Risk Mitigation (From admission, onward)           Ordered     IP VTE HIGH RISK PATIENT  Once         06/13/25 0018     Place sequential compression device  Until discontinued         06/13/25 0018     Reason for No Pharmacological VTE Prophylaxis  Once        Question:  Reasons:  Answer:  Physician Provided (leave comment)  Comment:  pending blood transfusion    06/13/25 0018                  //Core Measures   -DVT proph: SCDs, withholding anticoagulation in setting of anemia and pending blood transfusion, f/u with nephrology regarding starting Eliquis  -Code status: Full    -Surrogate: none provided       Components of this note were documented using a voice recognition system and are subject to errors not corrected at the time the document was proof read. Please contact the author for any clarifications.        Joni Vo MD  Department of Hospital Medicine  [facility] - Emergency Dept.

## 2025-06-13 NOTE — ASSESSMENT & PLAN NOTE
Anemia is likely due to chronic disease due to lupus nephritis. Most recent hemoglobin and hematocrit are listed below.  Recent Labs     06/12/25  1610 06/12/25  2107   HGB 5.4* 5.2*   HCT 16.7* 15.8*     Plan  -Monitor serial CBC: Every 12 hours  -Transfuse PRBC if patient becomes hemodynamically unstable, symptomatic or H/H drops below 7/21.  -Patient has not received any PRBC transfusions to date however, pending 2u pRBCs upon admission  -Patient's anemia is currently undergoing medical management  -f/u post-transfusion labs

## 2025-06-13 NOTE — CONSULTS
Nephrology Consultation  Consulting Physician:  Dr Vo   Reason for consultation:  AMBER in the setting of severe anemia with a history of lupus nephritis  Informants:  Patient, medical record     History of Present Illness     The patient is a 25 y.o. male with a hx of biopsy-proven lupus nephritis diagnosed October 2024 followed with [facility] nephrology Dr Giron in [place].  Patient has persistent proteinuria with an initial PCR of 5.9 g.  Treated with MMF, Plaquenil, prednisone. Voclosporin added per rheumatology.  Last seen per [facility] 05/12/2025 with a creatinine of 1.3 and a hemoglobin of 7.9.  At that visit he appeared to be close to remission per Nephrology notes.    Patient admitted to [facility] 6/12 with outpatient labs drawn per rheumatology which revealed significant anemia and worsening renal function.  Patient reportedly had stopped taking his medications since 06/05 due to fear of side effects.  Admit labs revealed a hemoglobin of 5.2, potassium 5.7, CO2 14, BUN/creatinine 60/4.2.  Denies NSAIDs. CXR negative.  Patient admitted per hospital medicine.  Renal consulted for AMBER on CKD.    PMHx:    Past Medical History:   Diagnosis Date    Marijuana use     Systemic lupus erythematosus, organ or system involvement unspecified        SocHx:    Social History[1]    FamHx:    Family History   Problem Relation Name Age of Onset    No Known Problems Mother      Hypertension Father      Sickle cell trait Sister      Heart failure Brother      Diabetes Brother         ROS:    CONSTITUTIONAL: negative for - chills, fever, or night sweats  CARDIOVASCULAR: negative for - chest pain, palpitations, tachycardia, or dyspnea on exertion  RESPIRATORY: negative for - cough, hemoptysis, shortness of breath, tachypnea, or wheezing  GASTROINTESTINAL: negative for - nausea, vomiting, constipation, diarrhea, abdominal pain, or change in bowel habits  GENITOURINARY:  negative for - incontinence, nocturia, dysuria,  "or sexual dysfunction   MUSCULOSKELETAL: negative for - joint stiffness, joint swelling, joint pain, or muscle pain   SKIN:  negative for jaundice, pruritus, rash, or lacerations  NEUROLOGIC: +weakness  ENDOCRINE: negative for - polydipsia, polyuria, galactorrhea, gynecomastia, hot flashes, or temperature intolerance  HEME/LYMPH: negative for - bleeding, hemorrhage, bruising, jaundice, or pallor     Health Status   Allergies:    is allergic to sulfa (sulfonamide antibiotics) and sulfamethoxazole-trimethoprim.    Current medications:   Current Medications[2]     Physical Examination   VS/Measurements   /66   Pulse 78   Temp 98.5 °F (36.9 °C) (Oral)   Resp 13   Ht 5' 9" (1.753 m)   Wt 71.2 kg (157 lb)   SpO2 100%   BMI 23.18 kg/m²     Physical Exam  Cardiovascular:      Rate and Rhythm: Normal rate and regular rhythm.      Pulses: Normal pulses.      Heart sounds: Normal heart sounds.   Pulmonary:      Effort: Pulmonary effort is normal.      Breath sounds: Normal breath sounds.   Abdominal:      General: Abdomen is flat. Bowel sounds are normal.      Palpations: Abdomen is soft.   Musculoskeletal:      Cervical back: Neck supple.      Right lower leg: Edema present.      Left lower leg: Edema present.   Skin:     General: Skin is warm and dry.   Neurological:      Mental Status: He is alert and oriented to person, place, and time.   Psychiatric:         Mood and Affect: Mood normal.         Behavior: Behavior normal.            Review / Management   Laboratory Results   Today's Lab Results :    Recent Results (from the past 24 hours)   PHOSPHORUS    Collection Time: 06/12/25  4:10 PM   Result Value Ref Range    Phosphorus Level 6.1 (H) 2.5 - 4.7 mg/dL   CBC WITH DIFFERENTIAL    Collection Time: 06/12/25  4:10 PM   Result Value Ref Range    WBC 7.4 4.5 - 11.0 103/uL    RBC 1.99 (L) 4.50 - 5.90 106/uL    Hemoglobin 5.4 (LL) 13.5 - 17.5 gm/dL    Hematocrit 16.7 (L) 40.0 - 51.0 %    MCV 83.8 80.0 - 100.0 " fL    MCH 27.3 26.0 - 34.0 pg    MCHC 32.6 31.0 - 37.0 g/dL    RDW 15.7 (H) 11.5 - 14.5 %    Platelet Count 229 130 - 400 103/uL    MPV 7.7 7.4 - 10.4 fL   C-REACTIVE PROTEIN    Collection Time: 06/12/25  4:10 PM   Result Value Ref Range    C-Reactive Protein 0.7 <0.9 mg/dL   COMPREHENSIVE METABOLIC PANEL    Collection Time: 06/12/25  4:10 PM   Result Value Ref Range    Sodium 138 135 - 146 mmol/L    Potassium 5.7 (H) 3.6 - 5.2 mmol/L    Chloride 113 (H) 96 - 110 mmol/L    Carbon Dioxide 17 (L) 24 - 32 mmol/L    Glucose 82 65 - 105 mg/dL    Calcium 7.4 (L) 8.4 - 10.3 mg/dL    BUN 60.0 (H) 7.0 - 25.0 mg/dL    Creatinine 4.27 (H) 0.70 - 1.40 mg/dL    Protein Total 5.0 (L) 6.0 - 8.0 g/dL    Albumin 1.8 (L) 3.4 - 5.0 g/dL    AST 18 <45 U/L    ALT 8 <46 U/L    Alkaline Phosphatase 70 20 - 120 U/L    Total Bilirubin 0.5 <1.3 mg/dL    eGFR 19 (L) >=90 mL/min/1.73m2    Anion Gap 8 8 - 16   Comprehensive metabolic panel    Collection Time: 06/12/25  9:07 PM   Result Value Ref Range    Sodium 136 136 - 145 mmol/L    Potassium 5.7 (H) 3.5 - 5.1 mmol/L    Chloride 113 (H) 95 - 110 mmol/L    CO2 14 (L) 23 - 29 mmol/L    Glucose 82 70 - 110 mg/dL    BUN 62 (H) 6 - 20 mg/dL    Creatinine 4.4 (H) 0.5 - 1.4 mg/dL    Calcium 7.4 (L) 8.7 - 10.5 mg/dL    Protein Total 5.2 (L) 6.0 - 8.4 gm/dL    Albumin 1.1 (L) 3.5 - 5.2 g/dL    Bilirubin Total 0.6 0.1 - 1.0 mg/dL    ALP 78 40 - 150 unit/L    AST 21 11 - 45 unit/L    ALT 6 (L) 10 - 44 unit/L    Anion Gap 9 8 - 16 mmol/L    eGFR 18 (L) >60 mL/min/1.73/m2   Type & Screen    Collection Time: 06/12/25  9:07 PM   Result Value Ref Range    Specimen Outdate 06/15/2025 23:59     Group & Rh O POS    CBC with Differential    Collection Time: 06/12/25  9:07 PM   Result Value Ref Range    WBC 6.39 3.90 - 12.70 K/uL    RBC 1.92 (L) 4.60 - 6.20 M/uL    HGB 5.2 (LL) 14.0 - 18.0 gm/dL    HCT 15.8 (LL) 40.0 - 54.0 %    MCV 82 82 - 98 fL    MCH 27.1 27.0 - 31.0 pg    MCHC 32.9 32.0 - 36.0 g/dL    RDW  15.9 (H) 11.5 - 14.5 %    Platelet Count 209 150 - 450 K/uL    MPV 9.6 9.2 - 12.9 fL    Nucleated RBC 0 <=0 /100 WBC    Neut % 70.6 38 - 73 %    Lymph % 20.2 18 - 48 %    Mono % 8.3 4 - 15 %    Eos % 0.3 <=8 %    Basophil % 0.0 <=1.9 %    Imm Grans % 0.6 (H) 0.0 - 0.5 %    Neut # 4.51 1.8 - 7.7 K/uL    Lymph # 1.29 1 - 4.8 K/uL    Mono # 0.53 0.3 - 1 K/uL    Eos # 0.02 <=0.5 K/uL    Baso # 0.00 <=0.2 K/uL    Imm Grans # 0.04 0.00 - 0.04 K/uL   Troponin I    Collection Time: 06/12/25  9:07 PM   Result Value Ref Range    Troponin-I 0.012 <=0.026 ng/mL   Brain natriuretic peptide    Collection Time: 06/12/25  9:07 PM   Result Value Ref Range    BNP 11 0 - 99 pg/mL   Prepare RBC 2 Units; anemia    Collection Time: 06/12/25  9:07 PM   Result Value Ref Range    UNIT NUMBER [id number]     UNIT ABO/RH O POS     DISPENSE STATUS Issued     Unit Expiration [id number]     Product Code X2103Q75     Unit Blood Type Code 5100     CROSSMATCH INTERPRETATION Compatible     UNIT NUMBER [id number]     UNIT ABO/RH O POS     DISPENSE STATUS Issued     Unit Expiration [id number]     Product Code A3305W35     Unit Blood Type Code 5100     CROSSMATCH INTERPRETATION Compatible    Indirect Antiglobulin Test    Collection Time: 06/12/25  9:07 PM   Result Value Ref Range    Antibody Screen NEG         Impression and Plan   Diagnosis     AMBER on CKD.  Baseline creatinine 1.3-1.4.  Likely IVVD in the setting of severe anemia.  Admit creatinine 4.4 improving to 4.3 with PRBCs and fluids.  Patient back on lupus meds. Denies NSAIDs.  Check UA, urine electrolytes, fractional excretion of sodium, CPK.    Anemia.  Due to severe anemia with possible bleed, PRBC transfusions.      Hyperkalemia.  Persistent.  We will start t.i.d. Lokelma and follow labs.    Hypocalcemia.  Albumin 0.9.  Corrected calcium 9.4.    Metabolic acidosis.  P.o. bicarb supplements.    Lupus nephritis.  On MMF, Plaquenil, prednisone, Voclosporin    Hypertension.  Good  control off meds.    Thrombocytopenia.  Mild.  Monitor.       .     Inpatient consult to Nephrology  Consult performed by: Giovanni Mcguire NP  Consult ordered by: Joni Vo MD             [1]   Social History  Socioeconomic History    Marital status: Single   Tobacco Use    Smoking status: Former     Types: Cigarettes    Smokeless tobacco: Former   Vaping Use    Vaping status: Never Used   Substance and Sexual Activity    Alcohol use: Not Currently    Drug use: Yes     Types: Marijuana     Social Drivers of Health     Financial Resource Strain: Low Risk  (3/20/2025)    Received from [facility]    Overall Financial Resource Strain (CARDIA)     Difficulty of Paying Living Expenses: Not hard at all   Food Insecurity: No Food Insecurity (3/20/2025)    Received from [facility]    Hunger Vital Sign     Worried About Running Out of Food in the Last Year: Never true     Ran Out of Food in the Last Year: Never true   Transportation Needs: No Transportation Needs (3/20/2025)    Received from [facility]    PRAPARE - Transportation     Lack of Transportation (Medical): No     Lack of Transportation (Non-Medical): No   Physical Activity: Inactive (3/20/2025)    Received from [facility]    Exercise Vital Sign     Days of Exercise per Week: 0 days     Minutes of Exercise per Session: 30 min   Stress: No Stress Concern Present (3/20/2025)    Received from [facility]    [employer] [place] [employer] - Occupational Stress Questionnaire     Feeling of Stress : Not at all   Housing Stability: Unknown (3/20/2025)    Received from [facility]    Housing Stability Vital Sign     Unable to Pay for Housing in the Last Year: No   [2]   Current Facility-Administered Medications:     0.9%  NaCl infusion (for blood administration), , Intravenous, Q24H PRN, René Lewis Jr., MD    acetaminophen suppository 650 mg, 650 mg, Rectal, Q6H PRN, Joni Vo MD    acetaminophen tablet 650 mg, 650 mg, Oral,  Q8H PRN, Joni Vo MD    albuterol-ipratropium 2.5 mg-0.5 mg/3 mL nebulizer solution 3 mL, 3 mL, Nebulization, Q6H PRN, Joni Vo MD    aluminum-magnesium hydroxide-simethicone 200-200-20 mg/5 mL suspension 30 mL, 30 mL, Oral, QID PRN, Joni Vo MD    calcium carbonate 200 mg calcium (500 mg) chewable tablet 500 mg, 500 mg, Oral, Daily, Joni Vo MD    dextrose 50% injection 12.5 g, 12.5 g, Intravenous, PRN, Joni Vo MD    dextrose 50% injection 25 g, 25 g, Intravenous, PRN, Joni Vo MD    ferrous sulfate tablet 1 each, 1 tablet, Oral, Daily, Joni Vo MD    glucagon (human recombinant) injection 1 mg, 1 mg, Intramuscular, PRN, Joni Vo MD    glucose chewable tablet 16 g, 16 g, Oral, PRN, Joni Vo MD    glucose chewable tablet 24 g, 24 g, Oral, PRN, Joni Vo MD    HYDROcodone-acetaminophen 5-325 mg per tablet 1 tablet, 1 tablet, Oral, Q6H PRN, Joni Vo MD    hydroxychloroquine tablet 400 mg, 400 mg, Oral, Daily, Joni Vo MD    melatonin tablet 6 mg, 6 mg, Oral, Nightly PRN, Joni Vo MD    morphine injection 2 mg, 2 mg, Intravenous, Q4H PRN, Joni Vo MD    mycophenolate tablet 1,500 mg, 1,500 mg, Oral, BID, Joni Vo MD    naloxone 0.4 mg/mL injection 0.02 mg, 0.02 mg, Intravenous, PRN, Joni Vo MD    ondansetron injection 4 mg, 4 mg, Intravenous, Q8H PRN, Joni Vo MD    pantoprazole EC tablet 40 mg, 40 mg, Oral, Daily, Joni Vo MD    predniSONE tablet 15 mg, 15 mg, Oral, Daily, Joni Vo MD    promethazine tablet 25 mg, 25 mg, Oral, Q6H PRN, Joni Vo MD    senna-docusate 8.6-50 mg per tablet 1 tablet, 1 tablet, Oral, BID PRN, Joni Vo MD    sodium bicarbonate tablet 1,300 mg, 1,300 mg, Oral, TID, Joni Vo MD    sodium chloride 0.9% flush 10 mL, 10 mL, Intravenous,  Q12H PRN, Joni Vo MD    voclosporin Cap 23.7 mg, 23.7 mg, Oral, Daily, Joni Vo MD    Current Outpatient Medications:     diclofenac sodium (VOLTAREN) 1 % Gel, Apply 2 g topically 4 (four) times daily as needed., Disp: , Rfl:     ergocalciferol (ERGOCALCIFEROL) 50,000 unit Cap, Take 50,000 Units by mouth every 7 days., Disp: , Rfl:     ferrous sulfate (FEOSOL) 325 mg (65 mg iron) Tab tablet, Take 1 tablet every day by oral route for 90 days., Disp: , Rfl:     furosemide (LASIX) 40 MG tablet, Take 1 tablet (40 mg total) by mouth once daily., Disp: 30 tablet, Rfl: 0    hydroxychloroquine (PLAQUENIL) 200 mg tablet, Take 200 mg by mouth. for 90 days, Disp: , Rfl:     lisinopriL (PRINIVIL,ZESTRIL) 5 MG tablet, Take 5 mg by mouth., Disp: , Rfl:     mycophenolate (CELLCEPT) 500 mg Tab, Take 1 tablet (500 mg total) by mouth 2 (two) times daily., Disp: 60 tablet, Rfl: 0    ondansetron (ZOFRAN) 4 MG tablet, Take 4 mg by mouth., Disp: , Rfl:     pantoprazole (PROTONIX) 40 MG tablet, Take 1 tablet (40 mg total) by mouth once daily., Disp: 30 tablet, Rfl: 0    predniSONE (DELTASONE) 20 MG tablet, Take 1 tablet every day by oral route for 90 days., Disp: , Rfl:     voclosporin 7.9 mg Cap, Take 23.7 mg by mouth., Disp: , Rfl:     calcium carbonate (TUMS) 200 mg calcium (500 mg) chewable tablet, Take 500 mg by mouth., Disp: , Rfl:     sodium bicarbonate 650 MG tablet, Take 2 tablets (1,300 mg total) by mouth 3 (three) times daily., Disp: 180 tablet, Rfl: 0

## 2025-06-13 NOTE — HPI
Maikel Deleon is a 25 y.o. male with a PMH  has a past medical history of Marijuana use and Systemic lupus erythematosus, organ or system involvement unspecified. who presented to the ED directed by his rheumatologist after outpatient labs revealed significant anemia and AMBER.  Patient with recently diagnosed with lupus nephritis back in October 2024 in his been noncompliant with home medications.  Patient was last seen by Dr. Giron from Nephrology on 5/12/25 and was reported to be progressing nicely towards remission and stressed importance of adherence and Dr. Correia from Rheumatology yesterday who documented patient was more adherent to his medications however, patient reported to me he has not taken any of his medications since June 5 secondary to potential side affects.  Patient main complaint includes increased tiredness/fatigue as well as lower extremity swelling but reported all other review of systems negative except as noted above including negative epistaxis, hemoptysis, hematemesis, hematuria, melena, or hematochezia.  Initial workup in the ED revealed patient to be afebrile without leukocytosis, hemodynamically stable, H/H 5.2/15.8, potassium 5.7, BUN 60, creatinine/GFR 4.4/18, calcium 7.4, phosphorus 6.1, and chest x-ray negative for acute findings.  Patient admitted to Hospital Medicine inpatient for continued medical management.  Nephrology consulted and awaiting further evaluation/recommendations.    PCP: No, Primary Doctor

## 2025-06-14 LAB
ABSOLUTE EOSINOPHIL (OHS): 0.01 K/UL
ABSOLUTE MONOCYTE (OHS): 0.63 K/UL (ref 0.3–1)
ABSOLUTE NEUTROPHIL COUNT (OHS): 5.35 K/UL (ref 1.8–7.7)
ALBUMIN SERPL BCP-MCNC: 0.9 G/DL (ref 3.5–5.2)
ALP SERPL-CCNC: 67 UNIT/L (ref 40–150)
ALT SERPL W/O P-5'-P-CCNC: 8 UNIT/L (ref 10–44)
ANION GAP (OHS): 6 MMOL/L (ref 8–16)
ANION GAP (OHS): 7 MMOL/L (ref 8–16)
ANION GAP (OHS): 8 MMOL/L (ref 8–16)
ANION GAP (OHS): 8 MMOL/L (ref 8–16)
AST SERPL-CCNC: 15 UNIT/L (ref 11–45)
BASOPHILS # BLD AUTO: 0 K/UL
BASOPHILS NFR BLD AUTO: 0 %
BILIRUB SERPL-MCNC: 1.3 MG/DL (ref 0.1–1)
BUN SERPL-MCNC: 67 MG/DL (ref 6–20)
BUN SERPL-MCNC: 68 MG/DL (ref 6–20)
BUN SERPL-MCNC: 69 MG/DL (ref 6–20)
BUN SERPL-MCNC: 70 MG/DL (ref 6–20)
CALCIUM SERPL-MCNC: 7.1 MG/DL (ref 8.7–10.5)
CALCIUM SERPL-MCNC: 7.2 MG/DL (ref 8.7–10.5)
CHLORIDE SERPL-SCNC: 109 MMOL/L (ref 95–110)
CHLORIDE SERPL-SCNC: 111 MMOL/L (ref 95–110)
CHLORIDE SERPL-SCNC: 111 MMOL/L (ref 95–110)
CHLORIDE SERPL-SCNC: 112 MMOL/L (ref 95–110)
CO2 SERPL-SCNC: 16 MMOL/L (ref 23–29)
CO2 SERPL-SCNC: 16 MMOL/L (ref 23–29)
CO2 SERPL-SCNC: 17 MMOL/L (ref 23–29)
CO2 SERPL-SCNC: 18 MMOL/L (ref 23–29)
CREAT SERPL-MCNC: 3.9 MG/DL (ref 0.5–1.4)
CREAT SERPL-MCNC: 4 MG/DL (ref 0.5–1.4)
CREAT SERPL-MCNC: 4.2 MG/DL (ref 0.5–1.4)
CREAT SERPL-MCNC: 4.3 MG/DL (ref 0.5–1.4)
ERYTHROCYTE [DISTWIDTH] IN BLOOD BY AUTOMATED COUNT: 15.3 % (ref 11.5–14.5)
GFR SERPLBLD CREATININE-BSD FMLA CKD-EPI: 18 ML/MIN/1.73/M2
GFR SERPLBLD CREATININE-BSD FMLA CKD-EPI: 19 ML/MIN/1.73/M2
GFR SERPLBLD CREATININE-BSD FMLA CKD-EPI: 20 ML/MIN/1.73/M2
GFR SERPLBLD CREATININE-BSD FMLA CKD-EPI: 21 ML/MIN/1.73/M2
GLUCOSE SERPL-MCNC: 125 MG/DL (ref 70–110)
GLUCOSE SERPL-MCNC: 73 MG/DL (ref 70–110)
GLUCOSE SERPL-MCNC: 81 MG/DL (ref 70–110)
GLUCOSE SERPL-MCNC: 83 MG/DL (ref 70–110)
HCT VFR BLD AUTO: 20.9 % (ref 40–54)
HGB BLD-MCNC: 7.2 GM/DL (ref 14–18)
IMM GRANULOCYTES # BLD AUTO: 0.05 K/UL (ref 0–0.04)
IMM GRANULOCYTES NFR BLD AUTO: 0.7 % (ref 0–0.5)
LYMPHOCYTES # BLD AUTO: 0.93 K/UL (ref 1–4.8)
MCH RBC QN AUTO: 28.6 PG (ref 27–31)
MCHC RBC AUTO-ENTMCNC: 34.4 G/DL (ref 32–36)
MCV RBC AUTO: 83 FL (ref 82–98)
NUCLEATED RBC (/100WBC) (OHS): 0 /100 WBC
OHS QRS DURATION: 88 MS
OHS QTC CALCULATION: 404 MS
PHOSPHATE SERPL-MCNC: 6.6 MG/DL (ref 2.7–4.5)
PLATELET # BLD AUTO: 155 K/UL (ref 150–450)
PMV BLD AUTO: 9.7 FL (ref 9.2–12.9)
POCT GLUCOSE: 100 MG/DL (ref 70–110)
POCT GLUCOSE: 106 MG/DL (ref 70–110)
POCT GLUCOSE: 112 MG/DL (ref 70–110)
POCT GLUCOSE: 84 MG/DL (ref 70–110)
POCT GLUCOSE: 92 MG/DL (ref 70–110)
POTASSIUM SERPL-SCNC: 4.7 MMOL/L (ref 3.5–5.1)
POTASSIUM SERPL-SCNC: 5 MMOL/L (ref 3.5–5.1)
POTASSIUM SERPL-SCNC: 5.2 MMOL/L (ref 3.5–5.1)
POTASSIUM SERPL-SCNC: 5.2 MMOL/L (ref 3.5–5.1)
PROT SERPL-MCNC: 4.2 GM/DL (ref 6–8.4)
RBC # BLD AUTO: 2.52 M/UL (ref 4.6–6.2)
RELATIVE EOSINOPHIL (OHS): 0.1 %
RELATIVE LYMPHOCYTE (OHS): 13.3 % (ref 18–48)
RELATIVE MONOCYTE (OHS): 9 % (ref 4–15)
RELATIVE NEUTROPHIL (OHS): 76.9 % (ref 38–73)
SODIUM SERPL-SCNC: 133 MMOL/L (ref 136–145)
SODIUM SERPL-SCNC: 135 MMOL/L (ref 136–145)
SODIUM SERPL-SCNC: 135 MMOL/L (ref 136–145)
SODIUM SERPL-SCNC: 136 MMOL/L (ref 136–145)
WBC # BLD AUTO: 6.97 K/UL (ref 3.9–12.7)

## 2025-06-14 PROCEDURE — 85025 COMPLETE CBC W/AUTO DIFF WBC: CPT | Performed by: HOSPITALIST

## 2025-06-14 PROCEDURE — 36415 COLL VENOUS BLD VENIPUNCTURE: CPT | Performed by: STUDENT IN AN ORGANIZED HEALTH CARE EDUCATION/TRAINING PROGRAM

## 2025-06-14 PROCEDURE — 25000003 PHARM REV CODE 250: Performed by: HOSPITALIST

## 2025-06-14 PROCEDURE — 25000003 PHARM REV CODE 250: Performed by: NURSE PRACTITIONER

## 2025-06-14 PROCEDURE — 80048 BASIC METABOLIC PNL TOTAL CA: CPT | Performed by: STUDENT IN AN ORGANIZED HEALTH CARE EDUCATION/TRAINING PROGRAM

## 2025-06-14 PROCEDURE — 25000003 PHARM REV CODE 250: Performed by: EMERGENCY MEDICINE

## 2025-06-14 PROCEDURE — 21400001 HC TELEMETRY ROOM

## 2025-06-14 PROCEDURE — 84100 ASSAY OF PHOSPHORUS: CPT | Performed by: INTERNAL MEDICINE

## 2025-06-14 PROCEDURE — 51798 US URINE CAPACITY MEASURE: CPT

## 2025-06-14 PROCEDURE — 82310 ASSAY OF CALCIUM: CPT | Performed by: STUDENT IN AN ORGANIZED HEALTH CARE EDUCATION/TRAINING PROGRAM

## 2025-06-14 PROCEDURE — 63600175 PHARM REV CODE 636 W HCPCS: Performed by: HOSPITALIST

## 2025-06-14 PROCEDURE — 99232 SBSQ HOSP IP/OBS MODERATE 35: CPT | Mod: ,,, | Performed by: NURSE PRACTITIONER

## 2025-06-14 RX ORDER — SODIUM CHLORIDE 9 MG/ML
INJECTION, SOLUTION INTRAVENOUS CONTINUOUS
Status: ACTIVE | OUTPATIENT
Start: 2025-06-14 | End: 2025-06-14

## 2025-06-14 RX ADMIN — MYCOPHENOLATE MOFETIL 1500 MG: 500 TABLET, FILM COATED ORAL at 09:06

## 2025-06-14 RX ADMIN — HYDROXYCHLOROQUINE SULFATE 400 MG: 200 TABLET, FILM COATED ORAL at 09:06

## 2025-06-14 RX ADMIN — CALCIUM CARBONATE (ANTACID) CHEW TAB 500 MG 500 MG: 500 CHEW TAB at 09:06

## 2025-06-14 RX ADMIN — SODIUM ZIRCONIUM CYCLOSILICATE 10 G: 5 POWDER, FOR SUSPENSION ORAL at 09:06

## 2025-06-14 RX ADMIN — PREDNISONE 15 MG: 5 TABLET ORAL at 09:06

## 2025-06-14 RX ADMIN — SODIUM CHLORIDE: 9 INJECTION, SOLUTION INTRAVENOUS at 11:06

## 2025-06-14 RX ADMIN — SODIUM CHLORIDE: 9 INJECTION, SOLUTION INTRAVENOUS at 09:06

## 2025-06-14 RX ADMIN — SODIUM BICARBONATE 1300 MG: 650 TABLET ORAL at 03:06

## 2025-06-14 RX ADMIN — SODIUM BICARBONATE 1300 MG: 650 TABLET ORAL at 09:06

## 2025-06-14 RX ADMIN — PANTOPRAZOLE SODIUM 40 MG: 40 TABLET, DELAYED RELEASE ORAL at 09:06

## 2025-06-14 RX ADMIN — SODIUM ZIRCONIUM CYCLOSILICATE 10 G: 5 POWDER, FOR SUSPENSION ORAL at 03:06

## 2025-06-14 RX ADMIN — FERROUS SULFATE TAB 325 MG (65 MG ELEMENTAL FE) 1 EACH: 325 (65 FE) TAB at 09:06

## 2025-06-14 NOTE — ASSESSMENT & PLAN NOTE
Hyperkalemia is likely due to AMBER.The patients most recent potassium results are listed below.  Recent Labs     06/13/25  1159 06/13/25  1601 06/13/25 1958   K 6.0* 5.9* 6.1*     Plan  -Monitor for arrhythmias with EKG and/or continuous telemetry.   -Treat the hyperkalemia with fluids and hyperkalemia as needed  -Monitor potassium: Every 12 hours  -The patient's hyperkalemia is currently undergoing medical management

## 2025-06-14 NOTE — SUBJECTIVE & OBJECTIVE
Interval History: No acute events overnight, afebrile, hemodynamically stable. Underwent 2u pRbc transfusion with improvement in anemia.  Nephrology consulted for AMBER concerns, evaluation noting possibly related to intravascular volume depletion due to low urine sodium versus anemia.  Started on Lokelma, IV fluids and furosemide trial for hyperkalemia.    Objective:     Vital Signs (Most Recent):  Temp: 98.3 °F (36.8 °C) (06/13/25 1912)  Pulse: 75 (71) (06/13/25 2100)  Resp: 18 (06/13/25 1912)  BP: 124/78 (06/13/25 1912)  SpO2: 100 % (06/13/25 1912) Vital Signs (24h Range):  Temp:  [97.7 °F (36.5 °C)-98.6 °F (37 °C)] 98.3 °F (36.8 °C)  Pulse:  [75-93] 75  Resp:  [10-25] 18  SpO2:  [100 %] 100 %  BP: (102-137)/(52-93) 124/78     Weight: 71.2 kg (157 lb)  Body mass index is 23.18 kg/m².    Intake/Output Summary (Last 24 hours) at 6/13/2025 2239  Last data filed at 6/13/2025 1852  Gross per 24 hour   Intake 1658.75 ml   Output 240 ml   Net 1418.75 ml         Physical Exam  Vitals and nursing note reviewed.   Constitutional:       General: He is not in acute distress.     Appearance: He is not ill-appearing, toxic-appearing or diaphoretic.   HENT:      Head: Normocephalic and atraumatic.      Mouth/Throat:      Mouth: Mucous membranes are moist.   Eyes:      General: No scleral icterus.        Right eye: No discharge.         Left eye: No discharge.   Cardiovascular:      Rate and Rhythm: Normal rate and regular rhythm.      Heart sounds: Normal heart sounds.   Pulmonary:      Effort: Pulmonary effort is normal. No respiratory distress.      Breath sounds: No wheezing, rhonchi or rales.   Abdominal:      General: Bowel sounds are normal. There is no distension.      Tenderness: There is no abdominal tenderness. There is no guarding or rebound.   Musculoskeletal:         General: Swelling present.      Cervical back: No rigidity.      Right lower leg: Edema present.      Left lower leg: Edema present.   Skin:      General: Skin is warm and dry.      Coloration: Skin is not jaundiced.   Neurological:      Mental Status: He is alert and oriented to person, place, and time. Mental status is at baseline.   Psychiatric:         Mood and Affect: Mood normal.         Behavior: Behavior normal.             Significant Labs: All pertinent labs within the past 24 hours have been reviewed.   Recent Labs   Lab 06/13/25  1159 06/13/25  1601 06/13/25  1958   * 134* 135*   K 6.0* 5.9* 6.1*   * 111* 112*   CO2 16* 17* 18*   BUN 62* 63* 65*   CREATININE 4.0* 4.1* 4.2*   GLU 83 77 103   ANIONGAP 7* 6* 5*     Recent Labs   Lab 06/12/25  1610 06/12/25  2107 06/13/25  0600   AST 18 21 19   ALT 8 6* 7*   ALKPHOS  --  78 60   BILITOT 0.5 0.6 0.7   ALBUMIN 1.8* 1.1* 0.9*     POCT Glucose:   Recent Labs   Lab 06/13/25  1444 06/13/25  1639 06/13/25  2136   POCTGLUCOSE 106 107 106    Recent Labs   Lab 06/12/25  2107 06/13/25  0600 06/13/25  1159   WBC 6.39 4.18 6.14   HGB 5.2* 5.1* 8.5*   HCT 15.8* 15.0* 25.1*    143* 175   GRAN  --   --  4.1                     Significant Imaging:  I have reviewed all pertinent imaging results/findings within the past 24 hours.   X-Ray Chest AP Portable   Final Result    No acute findings.      Finalized on: 6/12/2025 9:10 PM By:  Santo Sanchez MD   [facility]# [id number]      2025-06-12 21:12:11.879     [facility]          Inpatient Medications:    Scheduled Meds:   calcium carbonate  500 mg Oral Daily    calcium gluconate IVPB  1 g Intravenous Once    dextrose 50%  50 g Intravenous Once    And    insulin regular  0.1 Units/kg Intravenous Once    ferrous sulfate  1 tablet Oral Daily    hydroxychloroquine  400 mg Oral Daily    mycophenolate  1,500 mg Oral BID    pantoprazole  40 mg Oral Daily    predniSONE  15 mg Oral Daily    sodium bicarbonate  1,300 mg Oral TID    sodium zirconium cyclosilicate  10 g Oral TID    voclosporin  23.7 mg Oral Daily       PRN Meds:  Current Facility-Administered Medications:      0.9%  NaCl infusion (for blood administration), , Intravenous, Q24H PRN    acetaminophen, 650 mg, Rectal, Q6H PRN    acetaminophen, 650 mg, Oral, Q8H PRN    albuterol-ipratropium, 3 mL, Nebulization, Q6H PRN    aluminum-magnesium hydroxide-simethicone, 30 mL, Oral, QID PRN    calcium gluconate IVPB, 1 g, Intravenous, Once **AND** calcium gluconate IVPB, 1 g, Intravenous, Q10 Min PRN    dextrose 50%, 12.5 g, Intravenous, PRN    dextrose 50%, 25 g, Intravenous, PRN    [COMPLETED] dextrose 50%, 50 g, Intravenous, Once **AND** dextrose 50%, 25 g, Intravenous, PRN **AND** [COMPLETED] insulin regular, 0.1 Units/kg, Intravenous, Once    dextrose 50%, 50 g, Intravenous, Once **AND** dextrose 50%, 25 g, Intravenous, PRN **AND** insulin regular, 0.1 Units/kg, Intravenous, Once    glucagon (human recombinant), 1 mg, Intramuscular, PRN    glucose, 16 g, Oral, PRN    glucose, 24 g, Oral, PRN    HYDROcodone-acetaminophen, 1 tablet, Oral, Q6H PRN    melatonin, 6 mg, Oral, Nightly PRN    morphine, 2 mg, Intravenous, Q4H PRN    naloxone, 0.02 mg, Intravenous, PRN    ondansetron, 4 mg, Intravenous, Q8H PRN    promethazine, 25 mg, Oral, Q6H PRN    senna-docusate, 1 tablet, Oral, BID PRN    sodium chloride 0.9%, 10 mL, Intravenous, Q12H PRN

## 2025-06-14 NOTE — PLAN OF CARE
Problem: Adult Inpatient Plan of Care  Goal: Plan of Care Review  Outcome: Progressing  Flowsheets (Taken 6/14/2025 1758)  Plan of Care Reviewed With: (mother)   patient   parent   other (see comments)  Goal: Absence of Hospital-Acquired Illness or Injury  Intervention: Identify and Manage Fall Risk  Flowsheets (Taken 6/14/2025 1758)  Safety Promotion/Fall Prevention:   assistive device/personal item within reach   Fall Risk signage in place   family expresses understanding of fall risk and prevention   Fall Risk reviewed with patient/family   family to remain at bedside   lighting adjusted   medications reviewed   side rails raised x 2   patient expresses understanding of fall risk and prevention  Intervention: Prevent Skin Injury  Flowsheets (Taken 6/14/2025 1758)  Body Position: position changed independently  Skin Protection: incontinence pads utilized  Device Skin Pressure Protection: absorbent pad utilized/changed  Intervention: Prevent and Manage VTE (Venous Thromboembolism) Risk  Flowsheets (Taken 6/14/2025 1758)  VTE Prevention/Management:   remove, assess skin, and reapply sequential compression device   ROM (active) performed   intravenous hydration  Intervention: Prevent Infection  Flowsheets (Taken 6/14/2025 1758)  Infection Prevention: hand hygiene promoted  Goal: Optimal Comfort and Wellbeing  Outcome: Progressing  Intervention: Monitor Pain and Promote Comfort  Flowsheets (Taken 6/14/2025 1758)  Pain Management Interventions:   quiet environment facilitated   relaxation techniques promoted  Intervention: Provide Person-Centered Care  Flowsheets (Taken 6/14/2025 1758)  Trust Relationship/Rapport:   care explained   reassurance provided   choices provided   emotional support provided   empathic listening provided   questions answered   questions encouraged   thoughts/feelings acknowledged     Problem: Acute Kidney Injury/Impairment  Goal: Fluid and Electrolyte Balance  Outcome:  Progressing  Intervention: Monitor and Manage Fluid and Electrolyte Balance  Flowsheets (Taken 6/14/2025 1758)  Fluid/Electrolyte Management:   intravenous fluid replacement initiated   fluids provided  Goal: Improved Oral Intake  Outcome: Progressing  Intervention: Promote and Optimize Oral Intake  Flowsheets (Taken 6/14/2025 1758)  Oral Nutrition Promotion: rest periods promoted  Nutrition Interventions: (low sodium, renal diet) other (see comments)  Goal: Effective Renal Function  Outcome: Ongoing  Intervention: Monitor and Support Renal Function  Flowsheets (Taken 6/14/2025 1758)  Medication Review/Management: medications reviewed     Problem: Skin Injury Risk Increased  Goal: Skin Health and Integrity  Outcome: Progressing  Intervention: Promote and Optimize Oral Intake  Flowsheets (Taken 6/14/2025 1758)  Oral Nutrition Promotion: rest periods promoted  Nutrition Interventions: (low sodium, renal diet) other (see comments)

## 2025-06-14 NOTE — PROGRESS NOTES
[facility]  Progress Note    Patient Name: Maikel Deleon  MRN: [id number]  Patient Class: IP- Inpatient   Admission Date: 6/12/2025  Length of Stay: 1 days  Attending Physician: Christopher Campbell DO  Primary Care Provider: Elaina, Primary Doctor        Subjective     Principal Problem:Acute kidney injury superimposed on chronic kidney disease        HPI:  Maikel Deleon is a 25 y.o. male with a PMH  has a past medical history of Marijuana use and Systemic lupus erythematosus, organ or system involvement unspecified. who presented to the ED directed by his rheumatologist after outpatient labs revealed significant anemia and AMBER.  Patient with recently diagnosed with lupus nephritis back in October 2024 in his been noncompliant with home medications.  Patient was last seen by Dr. Giron from Nephrology on 5/12/25 and was reported to be progressing nicely towards remission and stressed importance of adherence and Dr. Correia from Rheumatology yesterday who documented patient was more adherent to his medications however, patient reported to me he has not taken any of his medications since June 5 secondary to potential side affects.  Patient main complaint includes increased tiredness/fatigue as well as lower extremity swelling but reported all other review of systems negative except as noted above including negative epistaxis, hemoptysis, hematemesis, hematuria, melena, or hematochezia.  Initial workup in the ED revealed patient to be afebrile without leukocytosis, hemodynamically stable, H/H 5.2/15.8, potassium 5.7, BUN 60, creatinine/GFR 4.4/18, calcium 7.4, phosphorus 6.1, and chest x-ray negative for acute findings.  Patient admitted to Hospital Medicine inpatient for continued medical management.  Nephrology consulted and awaiting further evaluation/recommendations.    PCP: Elaina, Primary Doctor      Overview/Hospital Course:  Admitted to Hospital Medicine for  evaluation of anemia and AMBER on CKD concerns.  Underwent 2u pRbc transfusion with improvement in anemia.  Nephrology consulted for AMBER concerns, evaluation noting possibly related to intravascular volume depletion due to low urine sodium versus anemia.  Started on Lokelma, IV fluids and furosemide trial for hyperkalemia.    Interval History: No acute events overnight, afebrile, hemodynamically stable. Underwent 2u pRbc transfusion with improvement in anemia.  Nephrology consulted for AMBER concerns, evaluation noting possibly related to intravascular volume depletion due to low urine sodium versus anemia.  Started on Lokelma, IV fluids and furosemide trial for hyperkalemia.    Objective:     Vital Signs (Most Recent):  Temp: 98.3 °F (36.8 °C) (06/13/25 1912)  Pulse: 75 (71) (06/13/25 2100)  Resp: 18 (06/13/25 1912)  BP: 124/78 (06/13/25 1912)  SpO2: 100 % (06/13/25 1912) Vital Signs (24h Range):  Temp:  [97.7 °F (36.5 °C)-98.6 °F (37 °C)] 98.3 °F (36.8 °C)  Pulse:  [75-93] 75  Resp:  [10-25] 18  SpO2:  [100 %] 100 %  BP: (102-137)/(52-93) 124/78     Weight: 71.2 kg (157 lb)  Body mass index is 23.18 kg/m².    Intake/Output Summary (Last 24 hours) at 6/13/2025 2239  Last data filed at 6/13/2025 1852  Gross per 24 hour   Intake 1658.75 ml   Output 240 ml   Net 1418.75 ml         Physical Exam  Vitals and nursing note reviewed.   Constitutional:       General: He is not in acute distress.     Appearance: He is not ill-appearing, toxic-appearing or diaphoretic.   HENT:      Head: Normocephalic and atraumatic.      Mouth/Throat:      Mouth: Mucous membranes are moist.   Eyes:      General: No scleral icterus.        Right eye: No discharge.         Left eye: No discharge.   Cardiovascular:      Rate and Rhythm: Normal rate and regular rhythm.      Heart sounds: Normal heart sounds.   Pulmonary:      Effort: Pulmonary effort is normal. No respiratory distress.      Breath sounds: No wheezing, rhonchi or rales.   Abdominal:       General: Bowel sounds are normal. There is no distension.      Tenderness: There is no abdominal tenderness. There is no guarding or rebound.   Musculoskeletal:         General: Swelling present.      Cervical back: No rigidity.      Right lower leg: Edema present.      Left lower leg: Edema present.   Skin:     General: Skin is warm and dry.      Coloration: Skin is not jaundiced.   Neurological:      Mental Status: He is alert and oriented to person, place, and time. Mental status is at baseline.   Psychiatric:         Mood and Affect: Mood normal.         Behavior: Behavior normal.             Significant Labs: All pertinent labs within the past 24 hours have been reviewed.   Recent Labs   Lab 06/13/25  1159 06/13/25  1601 06/13/25  1958   * 134* 135*   K 6.0* 5.9* 6.1*   * 111* 112*   CO2 16* 17* 18*   BUN 62* 63* 65*   CREATININE 4.0* 4.1* 4.2*   GLU 83 77 103   ANIONGAP 7* 6* 5*     Recent Labs   Lab 06/12/25  1610 06/12/25  2107 06/13/25  0600   AST 18 21 19   ALT 8 6* 7*   ALKPHOS  --  78 60   BILITOT 0.5 0.6 0.7   ALBUMIN 1.8* 1.1* 0.9*     POCT Glucose:   Recent Labs   Lab 06/13/25  1444 06/13/25  1639 06/13/25  2136   POCTGLUCOSE 106 107 106    Recent Labs   Lab 06/12/25  2107 06/13/25  0600 06/13/25  1159   WBC 6.39 4.18 6.14   HGB 5.2* 5.1* 8.5*   HCT 15.8* 15.0* 25.1*    143* 175   GRAN  --   --  4.1                     Significant Imaging:  I have reviewed all pertinent imaging results/findings within the past 24 hours.   X-Ray Chest AP Portable   Final Result    No acute findings.      Finalized on: 6/12/2025 9:10 PM By:  Santo Sanchez MD   [facility]# [id number]      2025-06-12 21:12:11.879     [facility]          Inpatient Medications:    Scheduled Meds:   calcium carbonate  500 mg Oral Daily    calcium gluconate IVPB  1 g Intravenous Once    dextrose 50%  50 g Intravenous Once    And    insulin regular  0.1 Units/kg Intravenous Once    ferrous sulfate  1 tablet Oral Daily     hydroxychloroquine  400 mg Oral Daily    mycophenolate  1,500 mg Oral BID    pantoprazole  40 mg Oral Daily    predniSONE  15 mg Oral Daily    sodium bicarbonate  1,300 mg Oral TID    sodium zirconium cyclosilicate  10 g Oral TID    voclosporin  23.7 mg Oral Daily       PRN Meds:  Current Facility-Administered Medications:     0.9%  NaCl infusion (for blood administration), , Intravenous, Q24H PRN    acetaminophen, 650 mg, Rectal, Q6H PRN    acetaminophen, 650 mg, Oral, Q8H PRN    albuterol-ipratropium, 3 mL, Nebulization, Q6H PRN    aluminum-magnesium hydroxide-simethicone, 30 mL, Oral, QID PRN    calcium gluconate IVPB, 1 g, Intravenous, Once **AND** calcium gluconate IVPB, 1 g, Intravenous, Q10 Min PRN    dextrose 50%, 12.5 g, Intravenous, PRN    dextrose 50%, 25 g, Intravenous, PRN    [COMPLETED] dextrose 50%, 50 g, Intravenous, Once **AND** dextrose 50%, 25 g, Intravenous, PRN **AND** [COMPLETED] insulin regular, 0.1 Units/kg, Intravenous, Once    dextrose 50%, 50 g, Intravenous, Once **AND** dextrose 50%, 25 g, Intravenous, PRN **AND** insulin regular, 0.1 Units/kg, Intravenous, Once    glucagon (human recombinant), 1 mg, Intramuscular, PRN    glucose, 16 g, Oral, PRN    glucose, 24 g, Oral, PRN    HYDROcodone-acetaminophen, 1 tablet, Oral, Q6H PRN    melatonin, 6 mg, Oral, Nightly PRN    morphine, 2 mg, Intravenous, Q4H PRN    naloxone, 0.02 mg, Intravenous, PRN    ondansetron, 4 mg, Intravenous, Q8H PRN    promethazine, 25 mg, Oral, Q6H PRN    senna-docusate, 1 tablet, Oral, BID PRN    sodium chloride 0.9%, 10 mL, Intravenous, Q12H PRN            Assessment & Plan  Acute kidney injury superimposed on chronic kidney disease  Baseline creatinine is 1.38. Most recent creatinine and eGFR are listed below.  Recent Labs     06/13/25  1159 06/13/25  1601 06/13/25 1958   CREATININE 4.0* 4.1* 4.2*   EGFRNORACEVR 20* 19* 19*     Plan  -AMBER is currently undergoing medical management  -Avoid nephrotoxins and renally  dose meds for GFR listed above  -Monitor urine output, serial BMP, and adjust therapy as needed  -f/u nephrology     Hyperkalemia  Hyperkalemia is likely due to AMBER.The patients most recent potassium results are listed below.  Recent Labs     06/13/25  1159 06/13/25  1601 06/13/25 1958   K 6.0* 5.9* 6.1*     Plan  -Monitor for arrhythmias with EKG and/or continuous telemetry.   -Treat the hyperkalemia with fluids and hyperkalemia as needed  -Monitor potassium: Every 12 hours  -The patient's hyperkalemia is currently undergoing medical management    Lupus nephritis  Patient recently diagnosed with lupus nephritis back in October 2024.  Has been followed both by Nephrology and Rheumatology outpatient last seen by Dr. Giron from Nephrology on 5/12/25 and Dr. Correia from Rheumatology yesterday with Assessment/Plan noted below.      Dr. 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 He was on MMF, plaquenil and prednisone and recently, started on voclosporin by rheum, but he had stopped all medications for about 6 weeks or so. He is back on everything now for about a week. He feels ok- no evidence of systemic lupus symptoms but UA still with proteinuria and some microscopic hematuria. Also his creatinine is a bit up since last time but still 1.38 mg/dL  Plan:  - DsDNA now negative. Complements normal.   - uric acid is elevated to 8.6  - cont cellcept 1500 mg PO BID, plaquenil 200 mg daily and prednisone 15 mg daily for now  - started on voclosporin with rheumatology in march but has only been on it for about a week  - will see rheum next month- if proteinuria/renal function stable, can consider tapering prednisone to 10 mg daily  - has PCP locally- should follow up in [place] with them too  - will start lisinopril 5 mg daily to help with proteinuria.   - he appears to be progressinly nicely towards remission but I discussed with him the  importance of staying on his regimen to completion in order to get him into remission, otherwise likelihood of flare up is high  - no edema noted- stopped furosemide    Dr. Correia from Rheumatology 6/12/2025    Lupus Nephritis Class III/V  Timeline as above in regards to patient's lupus nephritis diagnosis.  Unfortunately, patient had been off therapy for 6 weeks due to nausea/vomiting that he initially thought secondary to his medications. Notes that he will intermittently have nausea even off the medications and was encouraged to restart therapy with Neurology. Protein/Cr ratio significant with 10g of proteinuria and lower extremity edema. I have counseled and admonished the patient at stopping his medications and informed him to please continue them regardless and inform me if there is any issues.   Plan:  -Check labs to r/o medication side effects CBC, CMP, CRP and Sed Rate.  -JOSEPH, C3/C4 and UA UPCr.  -C/w PDN 15 mg every day, HCQ 400mg every day, MMF 1500mg BID and full dose Voclosporin.  -Discussed adherence with meds and will rx Zofran   -PPI, Ca/ Vit D   -If UPCr still in nephrotic range will start Eliquis  -start Zofran as needed and PPI daily  -referral to GI for nausea and vomiting as this was present before starting therapy and could be secondary to obstruction     Therapeutic drug monitoring   Long-term Plaquenil use  Long-term systemic steroid use  Plan:  -discussed continuing vitamin-D supplementation  -goal to continue to taper steroids as possible, continue PPI  -f/u with Ophthalmology for eye monitoring    Anemia due to chronic kidney disease  Anemia is likely due to chronic disease due to lupus nephritis. Most recent hemoglobin and hematocrit are listed below.  Recent Labs     06/12/25  2107 06/13/25  0600 06/13/25  1159   HGB 5.2* 5.1* 8.5*   HCT 15.8* 15.0* 25.1*     Plan  -Monitor serial CBC: Every 12 hours  -Transfuse PRBC if patient becomes hemodynamically unstable, symptomatic or H/H drops  below 7/21.  -Patient has received 2 units of PRBCs on 06/13/25  -Patient's anemia is currently undergoing medical management    Hypertension  Chronic, controlled.  Latest blood pressure and vitals reviewed-   Temp:  [97.7 °F (36.5 °C)-98.6 °F (37 °C)]   Pulse:  [75-93]   Resp:  [10-25]   BP: (102-137)/(52-93)   SpO2:  [100 %] .   Home meds for hypertension were reviewed and noted below.   Hypertension Medications              furosemide (LASIX) 40 MG tablet Take 1 tablet (40 mg total) by mouth once daily.    lisinopriL (PRINIVIL,ZESTRIL) 5 MG tablet Take 5 mg by mouth.     While in the hospital, will manage blood pressure as follows; Continue home antihypertensive regimen    Will utilize p.r.n. blood pressure medication only if patient's blood pressure greater than  180/110 and he develops symptoms such as worsening chest pain or shortness of breath.      VTE Risk Mitigation (From admission, onward)           Ordered     IP VTE HIGH RISK PATIENT  Once         06/13/25 0018     Place sequential compression device  Until discontinued         06/13/25 0018     Reason for No Pharmacological VTE Prophylaxis  Once        Question:  Reasons:  Answer:  Physician Provided (leave comment)  Comment:  pending blood transfusion    06/13/25 0018                    Discharge Planning   LUIGI:      Code Status: Full Code   Medical Readiness for Discharge Date:                    Christopher Campbell DO  Department of Hospital Medicine   [facility] - Telemetry ([facility])    Voice recognition software was used in the creation of this note/communication and any sound-alike/typographical errors which may have occurred despite initial review prior to signing should be taken in context when interpreting.  If such errors prevent a clear understanding of the note/communication, please contact the provider/office for clarification.

## 2025-06-14 NOTE — PLAN OF CARE
Pt oriented x4.  VSS.  Pt remained afebrile throughout this shift.   All meds administered per order.   Pt remained free of falls this shift.   Plan of care reviewed. Patient verbalizes understanding.   Pt moving/turning independently.   Bed low, side rails up x 2, wheels locked, call light in reach.   Patient instructed to call for assistance.  Patient education provided  Will continue to monitor.          Problem: Adult Inpatient Plan of Care  Goal: Plan of Care Review  Outcome: Progressing  Goal: Patient-Specific Goal (Individualized)  Outcome: Progressing  Goal: Absence of Hospital-Acquired Illness or Injury  Outcome: Progressing  Goal: Optimal Comfort and Wellbeing  Outcome: Progressing  Goal: Readiness for Transition of Care  Outcome: Progressing     Problem: Acute Kidney Injury/Impairment  Goal: Fluid and Electrolyte Balance  Outcome: Progressing  Goal: Improved Oral Intake  Outcome: Progressing  Goal: Effective Renal Function  Outcome: Progressing     Problem: Skin Injury Risk Increased  Goal: Skin Health and Integrity  Outcome: Progressing

## 2025-06-14 NOTE — HOSPITAL COURSE
6/12-6/13: Admitted to Hospital Medicine for evaluation of anemia and AMBER on CKD concerns.  Underwent 2u pRbc transfusion with improvement in anemia.  Nephrology consulted for AMBER concerns, evaluation noting possibly related to intravascular volume depletion due to low urine sodium versus anemia.  Started on Lokelma, IV fluids and furosemide trial for hyperkalemia.    6/14:  Mild improvement in renal function.  Hyperkalemia resolved.  6/15:  Electrolytes remained stable, but renal function remains concerning.  Nephrology following, further evaluation with renal ultrasound nonacute.  Lab work pending for further evaluation given concerns for lupus etiology.  Hemoglobin downtrended to 6.9, so 1u pRbc transfused.   6/16:  Renal function with some improvement, but still remains abnormal compared to baseline.  Hypocomplementemia, recurrent anemia on labs concerning for lupus flare.  Discussed with Rheumatology provider at [facility], plans for transfer to [facility] for evaluation by Rheumatology.   6/17 Transferred to [facility] for further evaluation.    Discharge plan of care was discussed at length with patient. All questions and concerns were answered.  Patient was in agreement with plan of care going forward. . Patient continued to remain afebrile, hemodynamically stable, able to tolerate diet, and ambulate without any significant concerns. Patient seen and examined on the day of discharge. Patient deemed stable for discharge for transfer to [facility].

## 2025-06-14 NOTE — PROGRESS NOTES
Nephrology progress note        History of Present Illness     The patient is a 25 y.o. male with a hx of biopsy-proven lupus nephritis diagnosed October 2024 followed with [facility] Dr Giron in [place].  Patient has persistent proteinuria with an initial PCR of 5.9 g.  Treated with MMF, Plaquenil, prednisone. Voclosporin added per rheumatology.  Last seen per [facility] 05/12/2025 with a creatinine of 1.3 and a hemoglobin of 7.9.  At that visit he appeared to be close to remission per Nephrology notes.    Patient admitted to [facility] 6/12 with outpatient labs drawn per rheumatology which revealed significant anemia and worsening renal function.  Patient reportedly had stopped taking his medications since 06/05 due to fear of side effects.  Admit labs revealed a hemoglobin of 5.2, potassium 5.7, CO2 14, BUN/creatinine 60/4.2.  Denies NSAIDs. CXR negative.  Patient admitted per hospital medicine.  Renal consulted for AMBER on CKD.    PMHx:    Past Medical History:   Diagnosis Date    Marijuana use     Systemic lupus erythematosus, organ or system involvement unspecified        SocHx:    Social History[1]    FamHx:    Family History   Problem Relation Name Age of Onset    No Known Problems Mother      Hypertension Father      Sickle cell trait Sister      Heart failure Brother      Diabetes Brother         ROS:    CONSTITUTIONAL: negative for - chills, fever, or night sweats  CARDIOVASCULAR: negative for - chest pain, palpitations, tachycardia, or dyspnea on exertion  RESPIRATORY: negative for - cough, hemoptysis, shortness of breath, tachypnea, or wheezing  GASTROINTESTINAL: negative for - nausea, vomiting, constipation, diarrhea, abdominal pain, or change in bowel habits  GENITOURINARY:  negative for - incontinence, nocturia, dysuria, or sexual dysfunction   MUSCULOSKELETAL: negative for - joint stiffness, joint swelling, joint pain, or muscle pain   SKIN:  negative for jaundice, pruritus, rash, or  "lacerations  NEUROLOGIC: +weakness  ENDOCRINE: negative for - polydipsia, polyuria, galactorrhea, gynecomastia, hot flashes, or temperature intolerance  HEME/LYMPH: negative for - bleeding, hemorrhage, bruising, jaundice, or pallor     Health Status   Allergies:    is allergic to sulfa (sulfonamide antibiotics) and sulfamethoxazole-trimethoprim.    Current medications:   Current Medications[2]     Physical Examination   VS/Measurements   /86   Pulse (!) 54   Temp 97.9 °F (36.6 °C)   Resp 18   Ht 5' 9" (1.753 m)   Wt 76.9 kg (169 lb 8.5 oz)   SpO2 100%   BMI 25.04 kg/m²     Physical Exam  Cardiovascular:      Rate and Rhythm: Normal rate and regular rhythm.      Pulses: Normal pulses.      Heart sounds: Normal heart sounds.   Pulmonary:      Effort: Pulmonary effort is normal.      Breath sounds: Normal breath sounds.   Abdominal:      General: Abdomen is flat. Bowel sounds are normal.      Palpations: Abdomen is soft.   Musculoskeletal:      Cervical back: Neck supple.      Right lower leg: Edema present.      Left lower leg: Edema present.   Skin:     General: Skin is warm and dry.   Neurological:      Mental Status: He is alert and oriented to person, place, and time.   Psychiatric:         Mood and Affect: Mood normal.         Behavior: Behavior normal.            Review / Management   Laboratory Results   Today's Lab Results :    Recent Results (from the past 24 hours)   CK    Collection Time: 06/13/25  9:16 AM   Result Value Ref Range     20 - 200 U/L   Creatinine, Random Urine    Collection Time: 06/13/25 11:46 AM   Result Value Ref Range    Urine Creatinine 266.1 23.0 - 375.0 mg/dL   Sodium, Random Urine    Collection Time: 06/13/25 11:46 AM   Result Value Ref Range    Urine Sodium <20 (L) 20 - 250 mmol/L   Urinalysis, Reflex to Urine Culture Urine, Clean Catch    Collection Time: 06/13/25 11:46 AM    Specimen: Urine, Clean Catch   Result Value Ref Range    Color, UA Yellow Straw, Rocio, " Yellow, [place]    Appearance, UA Hazy (A) Clear    pH, UA 7.0 5.0 - 8.0    Spec Grav UA 1.030 1.005 - 1.030    Protein, UA 3+ (A) Negative    Glucose, UA Negative Negative    Ketones, UA Negative Negative    Bilirubin, UA Negative Negative    Blood, UA 2+ (A) Negative    Nitrites, UA Negative Negative    Urobilinogen, UA Negative <2.0 EU/dL    Leukocyte Esterase, UA Negative Negative   GRE[place] URINE HOLD    Collection Time: 06/13/25 11:46 AM   Result Value Ref Range    Extra Tube Hold for add-ons.    Urinalysis Microscopic    Collection Time: 06/13/25 11:46 AM   Result Value Ref Range    RBC, UA 16 (H) 0 - 4 /HPF    WBC, UA 6 (H) 0 - 5 /HPF    WBC Clumps, UA Rare None, Rare    Bacteria, UA Rare None, Rare, Occasional /HPF    Squamous Epithelial Cells, UA 1 /HPF    Hyaline Casts, UA 72 (H) 0 - 1 /LPF    Microscopic Comment     CBC with Differential    Collection Time: 06/13/25 11:59 AM   Result Value Ref Range    WBC 6.14 3.90 - 12.70 K/uL    RBC 3.03 (L) 4.60 - 6.20 M/uL    HGB 8.5 (L) 14.0 - 18.0 gm/dL    HCT 25.1 (L) 40.0 - 54.0 %    MCV 83 82 - 98 fL    MCH 28.1 27.0 - 31.0 pg    MCHC 33.9 32.0 - 36.0 g/dL    RDW 14.3 11.5 - 14.5 %    Platelet Count 175 150 - 450 K/uL    MPV 9.6 9.2 - 12.9 fL    Nucleated RBC 0 <=0 /100 WBC   Basic metabolic panel    Collection Time: 06/13/25 11:59 AM   Result Value Ref Range    Sodium 135 (L) 136 - 145 mmol/L    Potassium 6.0 (H) 3.5 - 5.1 mmol/L    Chloride 112 (H) 95 - 110 mmol/L    CO2 16 (L) 23 - 29 mmol/L    Glucose 83 70 - 110 mg/dL    BUN 62 (H) 6 - 20 mg/dL    Creatinine 4.0 (H) 0.5 - 1.4 mg/dL    Calcium 7.4 (L) 8.7 - 10.5 mg/dL    Anion Gap 7 (L) 8 - 16 mmol/L    eGFR 20 (L) >60 mL/min/1.73/m2   Manual Differential    Collection Time: 06/13/25 11:59 AM   Result Value Ref Range    Gran # (ANC) 4.1 K/uL    Segmented Neutrophil % 67.0 38.0 - 73.0 %    Lymphocyte % 23.0 18.0 - 48.0 %    Monocyte % 10.0 4.0 - 15.0 %   POCT glucose    Collection Time: 06/13/25 12:50  PM   Result Value Ref Range    POCT Glucose 84 70 - 110 mg/dL   POCT glucose    Collection Time: 06/13/25  1:41 PM   Result Value Ref Range    POCT Glucose 206 (H) 70 - 110 mg/dL   POCT glucose    Collection Time: 06/13/25  2:44 PM   Result Value Ref Range    POCT Glucose 106 70 - 110 mg/dL   Basic metabolic panel    Collection Time: 06/13/25  4:01 PM   Result Value Ref Range    Sodium 134 (L) 136 - 145 mmol/L    Potassium 5.9 (H) 3.5 - 5.1 mmol/L    Chloride 111 (H) 95 - 110 mmol/L    CO2 17 (L) 23 - 29 mmol/L    Glucose 77 70 - 110 mg/dL    BUN 63 (H) 6 - 20 mg/dL    Creatinine 4.1 (H) 0.5 - 1.4 mg/dL    Calcium 7.6 (L) 8.7 - 10.5 mg/dL    Anion Gap 6 (L) 8 - 16 mmol/L    eGFR 19 (L) >60 mL/min/1.73/m2   POCT glucose    Collection Time: 06/13/25  4:39 PM   Result Value Ref Range    POCT Glucose 107 70 - 110 mg/dL   Basic metabolic panel    Collection Time: 06/13/25  7:58 PM   Result Value Ref Range    Sodium 135 (L) 136 - 145 mmol/L    Potassium 6.1 (H) 3.5 - 5.1 mmol/L    Chloride 112 (H) 95 - 110 mmol/L    CO2 18 (L) 23 - 29 mmol/L    Glucose 103 70 - 110 mg/dL    BUN 65 (H) 6 - 20 mg/dL    Creatinine 4.2 (H) 0.5 - 1.4 mg/dL    Calcium 7.1 (L) 8.7 - 10.5 mg/dL    Anion Gap 5 (L) 8 - 16 mmol/L    eGFR 19 (L) >60 mL/min/1.73/m2   POCT glucose    Collection Time: 06/13/25  9:36 PM   Result Value Ref Range    POCT Glucose 106 70 - 110 mg/dL   POCT glucose    Collection Time: 06/13/25 11:03 PM   Result Value Ref Range    POCT Glucose 101 70 - 110 mg/dL   POCT glucose    Collection Time: 06/13/25 11:51 PM   Result Value Ref Range    POCT Glucose 235 (H) 70 - 110 mg/dL   Basic metabolic panel    Collection Time: 06/14/25 12:19 AM   Result Value Ref Range    Sodium 136 136 - 145 mmol/L    Potassium 5.0 3.5 - 5.1 mmol/L    Chloride 112 (H) 95 - 110 mmol/L    CO2 18 (L) 23 - 29 mmol/L    Glucose 125 (H) 70 - 110 mg/dL    BUN 69 (H) 6 - 20 mg/dL    Creatinine 4.3 (H) 0.5 - 1.4 mg/dL    Calcium 7.2 (L) 8.7 - 10.5 mg/dL     Anion Gap 6 (L) 8 - 16 mmol/L    eGFR 18 (L) >60 mL/min/1.73/m2   POCT glucose    Collection Time: 06/14/25 12:52 AM   Result Value Ref Range    POCT Glucose 112 (H) 70 - 110 mg/dL   POCT glucose    Collection Time: 06/14/25  1:49 AM   Result Value Ref Range    POCT Glucose 92 70 - 110 mg/dL   CBC with Differential    Collection Time: 06/14/25  4:54 AM   Result Value Ref Range    WBC 6.97 3.90 - 12.70 K/uL    RBC 2.52 (L) 4.60 - 6.20 M/uL    HGB 7.2 (L) 14.0 - 18.0 gm/dL    HCT 20.9 (L) 40.0 - 54.0 %    MCV 83 82 - 98 fL    MCH 28.6 27.0 - 31.0 pg    MCHC 34.4 32.0 - 36.0 g/dL    RDW 15.3 (H) 11.5 - 14.5 %    Platelet Count 155 150 - 450 K/uL    MPV 9.7 9.2 - 12.9 fL    Nucleated RBC 0 <=0 /100 WBC    Neut % 76.9 (H) 38 - 73 %    Lymph % 13.3 (L) 18 - 48 %    Mono % 9.0 4 - 15 %    Eos % 0.1 <=8 %    Basophil % 0.0 <=1.9 %    Imm Grans % 0.7 (H) 0.0 - 0.5 %    Neut # 5.35 1.8 - 7.7 K/uL    Lymph # 0.93 (L) 1 - 4.8 K/uL    Mono # 0.63 0.3 - 1 K/uL    Eos # 0.01 <=0.5 K/uL    Baso # 0.00 <=0.2 K/uL    Imm Grans # 0.05 (H) 0.00 - 0.04 K/uL   Comprehensive Metabolic Panel (CMP)    Collection Time: 06/14/25  4:55 AM   Result Value Ref Range    Sodium 135 (L) 136 - 145 mmol/L    Potassium 5.2 (H) 3.5 - 5.1 mmol/L    Chloride 111 (H) 95 - 110 mmol/L    CO2 16 (L) 23 - 29 mmol/L    Glucose 81 70 - 110 mg/dL    BUN 70 (H) 6 - 20 mg/dL    Creatinine 4.2 (H) 0.5 - 1.4 mg/dL    Calcium 7.1 (L) 8.7 - 10.5 mg/dL    Protein Total 4.2 (L) 6.0 - 8.4 gm/dL    Albumin 0.9 (L) 3.5 - 5.2 g/dL    Bilirubin Total 1.3 (H) 0.1 - 1.0 mg/dL    ALP 67 40 - 150 unit/L    AST 15 11 - 45 unit/L    ALT 8 (L) 10 - 44 unit/L    Anion Gap 8 8 - 16 mmol/L    eGFR 19 (L) >60 mL/min/1.73/m2   Phosphorus    Collection Time: 06/14/25  4:55 AM   Result Value Ref Range    Phosphorus Level 6.6 (H) 2.7 - 4.5 mg/dL        Impression and Plan   Diagnosis     AMBER on CKD.  Baseline creatinine 1.3-1.4.  Likely IVVD in the setting of severe anemia.  Admit  creatinine 4.4 with slow improvement.  Patient back on lupus meds.  UA with 3+ protein and 2+ blood.  Fractional excretion of sodium 0.2% indicating prerenal.  CPK on alarming.  BNP 11.  Patient made 240 cc of urine yesterday.  Dry based on FENA.  We will give 1 bag of IV fluids to see if urine output picks up.    Anemia.  Due to severe anemia with possible bleed, PRBC transfusions.      Hyperkalemia.  Persistent but improving.  Continue Lokelma.    Hypocalcemia.  Albumin 0.9.  Corrected calcium 9.4.    Metabolic acidosis.  P.o. bicarb supplements.    Lupus nephritis.  On MMF, Plaquenil, prednisone, Voclosporin    Hypertension.  Good control off meds.    Thrombocytopenia.  Mild.  Monitor.       .              [1]   Social History  Socioeconomic History    Marital status: Single   Tobacco Use    Smoking status: Former     Types: Cigarettes    Smokeless tobacco: Former   Vaping Use    Vaping status: Never Used   Substance and Sexual Activity    Alcohol use: Not Currently    Drug use: Yes     Types: Marijuana     Social Drivers of Health     Financial Resource Strain: Low Risk  (3/20/2025)    Received from [facility]    Overall Financial Resource Strain (CARDIA)     Difficulty of Paying Living Expenses: Not hard at all   Food Insecurity: No Food Insecurity (3/20/2025)    Received from [facility]    Hunger Vital Sign     Worried About Running Out of Food in the Last Year: Never true     Ran Out of Food in the Last Year: Never true   Transportation Needs: No Transportation Needs (3/20/2025)    Received from [facility]    PRAPARE - Transportation     Lack of Transportation (Medical): No     Lack of Transportation (Non-Medical): No   Physical Activity: Inactive (3/20/2025)    Received from [facility]    Exercise Vital Sign     Days of Exercise per Week: 0 days     Minutes of Exercise per Session: 30 min   Stress: No Stress Concern Present (3/20/2025)    Received from [facility] [place] [facility] - Occupational Stress Questionnaire     Feeling of Stress : Not at all   Housing Stability: Unknown (3/20/2025)    Received from [facility]    Housing Stability Vital Sign     Unable to Pay for Housing in the Last Year: No   [2]   Current Facility-Administered Medications:     0.9%  NaCl infusion (for blood administration), , Intravenous, Q24H PRN, René Lewis Jr., MD    acetaminophen suppository 650 mg, 650 mg, Rectal, Q6H PRN, Joni Vo MD    acetaminophen tablet 650 mg, 650 mg, Oral, Q8H PRN, Joni Vo MD    albuterol-ipratropium 2.5 mg-0.5 mg/3 mL nebulizer solution 3 mL, 3 mL, Nebulization, Q6H PRN, Joni Vo MD    aluminum-magnesium hydroxide-simethicone 200-200-20 mg/5 mL suspension 30 mL, 30 mL, Oral, QID PRN, Joni Vo MD    calcium carbonate 200 mg calcium (500 mg) chewable tablet 500 mg, 500 mg, Oral, Daily, Joni Vo MD, 500 mg at 06/13/25 0730    [COMPLETED] calcium gluconate 1 g in D5W 50 mL IVPB (MB+), 1 g, Intravenous, Once, Stopped at 06/13/25 2314 **AND** calcium gluconate 1 g in D5W 50 mL IVPB (MB+), 1 g, Intravenous, Q10 Min PRN, Christopher Campbell DO    dextrose 50% injection 12.5 g, 12.5 g, Intravenous, PRN, Joni Vo MD    dextrose 50% injection 25 g, 25 g, Intravenous, PRN, Joni Vo MD    [COMPLETED] dextrose 50% injection 50 g, 50 g, Intravenous, Once, 50 g at 06/13/25 1251 **AND** dextrose 50% injection 25 g, 25 g, Intravenous, PRN **AND** [COMPLETED] insulin regular injection 7.12 Units 0.0712 mL, 0.1 Units/kg, Intravenous, Once, Christopher Campbell DO, 7.12 Units at 06/13/25 1254    [COMPLETED] dextrose 50% injection 50 g, 50 g, Intravenous, Once, 50 g at 06/13/25 2321 **AND** dextrose 50% injection 25 g, 25 g, Intravenous, PRN **AND** [COMPLETED] insulin regular injection 7.12 Units 0.0712 mL, 0.1 Units/kg, Intravenous, Once, Christopher Campbell, DO, 7.12 Units at 06/13/25 2323    ferrous sulfate  tablet 1 each, 1 tablet, Oral, Daily, Joni Vo MD, 1 each at 06/13/25 0730    glucagon (human recombinant) injection 1 mg, 1 mg, Intramuscular, PRN, Joni Vo MD    glucose chewable tablet 16 g, 16 g, Oral, PRN, Joni Vo MD    glucose chewable tablet 24 g, 24 g, Oral, PRN, Joni Vo MD    HYDROcodone-acetaminophen 5-325 mg per tablet 1 tablet, 1 tablet, Oral, Q6H PRN, Joni Vo MD    hydroxychloroquine tablet 400 mg, 400 mg, Oral, Daily, Joni Vo MD, 400 mg at 06/13/25 0730    melatonin tablet 6 mg, 6 mg, Oral, Nightly PRN, Joni Vo MD    morphine injection 2 mg, 2 mg, Intravenous, Q4H PRN, Joni Vo MD    mycophenolate tablet 1,500 mg, 1,500 mg, Oral, BID, Joni Vo MD, 1,500 mg at 06/13/25 2031    naloxone 0.4 mg/mL injection 0.02 mg, 0.02 mg, Intravenous, PRN, Joni Vo MD    ondansetron injection 4 mg, 4 mg, Intravenous, Q8H PRN, Joni Vo MD    pantoprazole EC tablet 40 mg, 40 mg, Oral, Daily, Joni Vo MD, 40 mg at 06/13/25 0730    predniSONE tablet 15 mg, 15 mg, Oral, Daily, Joni Vo MD, 15 mg at 06/13/25 0730    promethazine tablet 25 mg, 25 mg, Oral, Q6H PRN, Joni Vo MD    senna-docusate 8.6-50 mg per tablet 1 tablet, 1 tablet, Oral, BID PRN, Joni Vo MD    sodium bicarbonate tablet 1,300 mg, 1,300 mg, Oral, TID, Joni Vo MD, 1,300 mg at 06/13/25 2031    sodium chloride 0.9% flush 10 mL, 10 mL, Intravenous, Q12H PRN, Joni Vo MD    sodium zirconium cyclosilicate packet 10 g, 10 g, Oral, TID, Giovanni Mcguire, NP, 10 g at 06/13/25 2031    voclosporin Cap 23.7 mg, 23.7 mg, Oral, Daily, Joni Vo MD, 23.7 mg at 06/13/25 1824

## 2025-06-14 NOTE — ASSESSMENT & PLAN NOTE
Chronic, controlled.  Latest blood pressure and vitals reviewed-   Temp:  [97.7 °F (36.5 °C)-98.6 °F (37 °C)]   Pulse:  [75-93]   Resp:  [10-25]   BP: (102-137)/(52-93)   SpO2:  [100 %] .   Home meds for hypertension were reviewed and noted below.   Hypertension Medications              furosemide (LASIX) 40 MG tablet Take 1 tablet (40 mg total) by mouth once daily.    lisinopriL (PRINIVIL,ZESTRIL) 5 MG tablet Take 5 mg by mouth.     While in the hospital, will manage blood pressure as follows; Continue home antihypertensive regimen    Will utilize p.r.n. blood pressure medication only if patient's blood pressure greater than  180/110 and he develops symptoms such as worsening chest pain or shortness of breath.

## 2025-06-14 NOTE — PLAN OF CARE
[facility] - Telemetry (Hospital)  Initial Discharge Assessment       Primary Care Provider: Elaina, Primary Doctor    Admission Diagnosis: Shortness of breath [R06.02]  Chest pain [R07.9]  Iron deficiency anemia, unspecified iron deficiency anemia type [D50.9]  Acute renal failure superimposed on chronic kidney disease, unspecified acute renal failure type, unspecified CKD stage [N17.9, N18.9]    Admission Date: 6/12/2025  Expected Discharge Date:     Transition of Care Barriers: None    Payor: MEDICAID / Plan: [employer] [facility] / Product Type: Managed Medicaid /     Extended Emergency Contact Information  Primary Emergency Contact: kathia loco  Mobile Phone: [phone]  Relation: Mother  Preferred language: English   needed? No    Discharge Plan A: Home       No Pharmacies Listed    Initial Assessment (most recent)       Adult Discharge Assessment - 06/14/25 1127          Discharge Assessment    Assessment Type Discharge Planning Assessment     Confirmed/corrected address, phone number and insurance Yes     Confirmed Demographics Correct on Facesheet     Source of Information family     Communicated LUIGI with patient/caregiver Date not available/Unable to determine     People in Home parent(s)     Name(s) of People in Home mother Tsai, [phone]     Do you expect to return to your current living situation? Yes     Prior to hospitilization cognitive status: Alert/Oriented     Current cognitive status: Alert/Oriented     Walking or Climbing Stairs Difficulty no     Dressing/Bathing Difficulty no     Equipment Currently Used at Home none     Readmission within 30 days? No     Patient currently being followed by outpatient case management? No     Do you currently have service(s) that help you manage your care at home? No     Do you take prescription medications? Yes     Do you have prescription coverage? Yes     Do you have any problems affording any of your prescribed  medications? No     Is the patient taking medications as prescribed? yes     Who is going to help you get home at discharge? family     How do you get to doctors appointments? car, drives self     Are you on dialysis? No     Do you take coumadin? No     Discharge Plan A Home     DME Needed Upon Discharge  none     Discharge Plan discussed with: Patient     Transition of Care Barriers None

## 2025-06-14 NOTE — ASSESSMENT & PLAN NOTE
Baseline creatinine is 1.38. Most recent creatinine and eGFR are listed below.  Recent Labs     06/13/25  1159 06/13/25  1601 06/13/25  1958   CREATININE 4.0* 4.1* 4.2*   EGFRNORACEVR 20* 19* 19*     Plan  -AMBER is currently undergoing medical management  -Avoid nephrotoxins and renally dose meds for GFR listed above  -Monitor urine output, serial BMP, and adjust therapy as needed  -f/u nephrology

## 2025-06-14 NOTE — ASSESSMENT & PLAN NOTE
Anemia is likely due to chronic disease due to lupus nephritis. Most recent hemoglobin and hematocrit are listed below.  Recent Labs     06/12/25  2107 06/13/25  0600 06/13/25  1159   HGB 5.2* 5.1* 8.5*   HCT 15.8* 15.0* 25.1*     Plan  -Monitor serial CBC: Every 12 hours  -Transfuse PRBC if patient becomes hemodynamically unstable, symptomatic or H/H drops below 7/21.  -Patient has received 2 units of PRBCs on 06/13/25  -Patient's anemia is currently undergoing medical management

## 2025-06-14 NOTE — ASSESSMENT & PLAN NOTE
Patient recently diagnosed with lupus nephritis back in October 2024.  Has been followed both by Nephrology and Rheumatology outpatient last seen by Dr. Giron from Nephrology on 5/12/25 and Dr. Correia from Rheumatology yesterday with Assessment/Plan noted below.      Dr. 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 He was on MMF, plaquenil and prednisone and recently, started on voclosporin by rheum, but he had stopped all medications for about 6 weeks or so. He is back on everything now for about a week. He feels ok- no evidence of systemic lupus symptoms but UA still with proteinuria and some microscopic hematuria. Also his creatinine is a bit up since last time but still 1.38 mg/dL  Plan:  - DsDNA now negative. Complements normal.   - uric acid is elevated to 8.6  - cont cellcept 1500 mg PO BID, plaquenil 200 mg daily and prednisone 15 mg daily for now  - started on voclosporin with rheumatology in march but has only been on it for about a week  - will see rheum next month- if proteinuria/renal function stable, can consider tapering prednisone to 10 mg daily  - has PCP locally- should follow up in [place] with them too  - will start lisinopril 5 mg daily to help with proteinuria.   - he appears to be progressinly nicely towards remission but I discussed with him the importance of staying on his regimen to completion in order to get him into remission, otherwise likelihood of flare up is high  - no edema noted- stopped furosemide    Dr. Correia from Rheumatology 6/12/2025    Lupus Nephritis Class III/V  Timeline as above in regards to patient's lupus nephritis diagnosis.  Unfortunately, patient had been off therapy for 6 weeks due to nausea/vomiting that he initially thought secondary to his medications. Notes that he will intermittently have nausea even off the medications and was encouraged to restart therapy with Neurology.  Protein/Cr ratio significant with 10g of proteinuria and lower extremity edema. I have counseled and admonished the patient at stopping his medications and informed him to please continue them regardless and inform me if there is any issues.   Plan:  -Check labs to r/o medication side effects CBC, CMP, CRP and Sed Rate.  -JOSEPH, C3/C4 and UA UPCr.  -C/w PDN 15 mg every day, HCQ 400mg every day, MMF 1500mg BID and full dose Voclosporin.  -Discussed adherence with meds and will rx Zofran   -PPI, Ca/ Vit D   -If UPCr still in nephrotic range will start Eliquis  -start Zofran as needed and PPI daily  -referral to GI for nausea and vomiting as this was present before starting therapy and could be secondary to obstruction     Therapeutic drug monitoring   Long-term Plaquenil use  Long-term systemic steroid use  Plan:  -discussed continuing vitamin-D supplementation  -goal to continue to taper steroids as possible, continue PPI  -f/u with Ophthalmology for eye monitoring

## 2025-06-15 LAB
ABO + RH BLD: NORMAL
ABSOLUTE EOSINOPHIL (OHS): 0 K/UL
ABSOLUTE EOSINOPHIL (OHS): 0 K/UL
ABSOLUTE MONOCYTE (OHS): 0.33 K/UL (ref 0.3–1)
ABSOLUTE MONOCYTE (OHS): 0.41 K/UL (ref 0.3–1)
ABSOLUTE NEUTROPHIL COUNT (OHS): 4.88 K/UL (ref 1.8–7.7)
ABSOLUTE NEUTROPHIL COUNT (OHS): 6.27 K/UL (ref 1.8–7.7)
ALBUMIN SERPL BCP-MCNC: 0.9 G/DL (ref 3.5–5.2)
ALP SERPL-CCNC: 62 UNIT/L (ref 40–150)
ALT SERPL W/O P-5'-P-CCNC: 5 UNIT/L (ref 10–44)
ANION GAP (OHS): 7 MMOL/L (ref 8–16)
AST SERPL-CCNC: 13 UNIT/L (ref 11–45)
BASOPHILS # BLD AUTO: 0 K/UL
BASOPHILS # BLD AUTO: 0.01 K/UL
BASOPHILS NFR BLD AUTO: 0 %
BASOPHILS NFR BLD AUTO: 0.1 %
BILIRUB SERPL-MCNC: 0.7 MG/DL (ref 0.1–1)
BLD PROD TYP BPU: NORMAL
BLOOD UNIT EXPIRATION DATE: NORMAL
BLOOD UNIT TYPE CODE: 5100
BUN SERPL-MCNC: 71 MG/DL (ref 6–20)
C3 SERPL-MCNC: 41 MG/DL (ref 50–180)
C4 COMPLEMENT (OHS): 8 MG/DL (ref 11–44)
CALCIUM SERPL-MCNC: 6.7 MG/DL (ref 8.7–10.5)
CHLORIDE SERPL-SCNC: 110 MMOL/L (ref 95–110)
CO2 SERPL-SCNC: 16 MMOL/L (ref 23–29)
CREAT SERPL-MCNC: 4.1 MG/DL (ref 0.5–1.4)
CROSSMATCH INTERPRETATION: NORMAL
DISPENSE STATUS: NORMAL
ERYTHROCYTE [DISTWIDTH] IN BLOOD BY AUTOMATED COUNT: 15.2 % (ref 11.5–14.5)
ERYTHROCYTE [DISTWIDTH] IN BLOOD BY AUTOMATED COUNT: 15.5 % (ref 11.5–14.5)
GFR SERPLBLD CREATININE-BSD FMLA CKD-EPI: 19 ML/MIN/1.73/M2
GLUCOSE SERPL-MCNC: 103 MG/DL (ref 70–110)
HCT VFR BLD AUTO: 20.4 % (ref 40–54)
HCT VFR BLD AUTO: 23.2 % (ref 40–54)
HGB BLD-MCNC: 6.9 GM/DL (ref 14–18)
HGB BLD-MCNC: 8.3 GM/DL (ref 14–18)
IMM GRANULOCYTES # BLD AUTO: 0.06 K/UL (ref 0–0.04)
IMM GRANULOCYTES # BLD AUTO: 0.08 K/UL (ref 0–0.04)
IMM GRANULOCYTES NFR BLD AUTO: 0.9 % (ref 0–0.5)
IMM GRANULOCYTES NFR BLD AUTO: 1.1 % (ref 0–0.5)
LDH SERPL-CCNC: 226 U/L (ref 110–260)
LYMPHOCYTES # BLD AUTO: 0.55 K/UL (ref 1–4.8)
LYMPHOCYTES # BLD AUTO: 1.05 K/UL (ref 1–4.8)
MAGNESIUM SERPL-MCNC: 1.5 MG/DL (ref 1.6–2.6)
MAGNESIUM SERPL-MCNC: 1.5 MG/DL (ref 1.6–2.6)
MCH RBC QN AUTO: 28.2 PG (ref 27–31)
MCH RBC QN AUTO: 29 PG (ref 27–31)
MCHC RBC AUTO-ENTMCNC: 33.8 G/DL (ref 32–36)
MCHC RBC AUTO-ENTMCNC: 35.8 G/DL (ref 32–36)
MCV RBC AUTO: 81 FL (ref 82–98)
MCV RBC AUTO: 83 FL (ref 82–98)
NUCLEATED RBC (/100WBC) (OHS): 0 /100 WBC
NUCLEATED RBC (/100WBC) (OHS): 0 /100 WBC
PHOSPHATE SERPL-MCNC: 6.5 MG/DL (ref 2.7–4.5)
PLATELET # BLD AUTO: 144 K/UL (ref 150–450)
PLATELET # BLD AUTO: 171 K/UL (ref 150–450)
PMV BLD AUTO: 10 FL (ref 9.2–12.9)
PMV BLD AUTO: 10 FL (ref 9.2–12.9)
POTASSIUM SERPL-SCNC: 4.9 MMOL/L (ref 3.5–5.1)
PROT SERPL-MCNC: 4.2 GM/DL (ref 6–8.4)
RBC # BLD AUTO: 2.45 M/UL (ref 4.6–6.2)
RBC # BLD AUTO: 2.86 M/UL (ref 4.6–6.2)
RELATIVE EOSINOPHIL (OHS): 0 %
RELATIVE EOSINOPHIL (OHS): 0 %
RELATIVE LYMPHOCYTE (OHS): 16.4 % (ref 18–48)
RELATIVE LYMPHOCYTE (OHS): 7.6 % (ref 18–48)
RELATIVE MONOCYTE (OHS): 4.6 % (ref 4–15)
RELATIVE MONOCYTE (OHS): 6.4 % (ref 4–15)
RELATIVE NEUTROPHIL (OHS): 76.3 % (ref 38–73)
RELATIVE NEUTROPHIL (OHS): 86.6 % (ref 38–73)
RETICS/RBC NFR AUTO: 1.8 % (ref 0.4–2)
SODIUM SERPL-SCNC: 133 MMOL/L (ref 136–145)
UNIT NUMBER: NORMAL
WBC # BLD AUTO: 6.4 K/UL (ref 3.9–12.7)
WBC # BLD AUTO: 7.24 K/UL (ref 3.9–12.7)

## 2025-06-15 PROCEDURE — 86160 COMPLEMENT ANTIGEN: CPT | Performed by: NURSE PRACTITIONER

## 2025-06-15 PROCEDURE — 63600175 PHARM REV CODE 636 W HCPCS: Performed by: HOSPITALIST

## 2025-06-15 PROCEDURE — 25000003 PHARM REV CODE 250: Performed by: NURSE PRACTITIONER

## 2025-06-15 PROCEDURE — P9016 RBC LEUKOCYTES REDUCED: HCPCS | Performed by: STUDENT IN AN ORGANIZED HEALTH CARE EDUCATION/TRAINING PROGRAM

## 2025-06-15 PROCEDURE — 84100 ASSAY OF PHOSPHORUS: CPT | Performed by: STUDENT IN AN ORGANIZED HEALTH CARE EDUCATION/TRAINING PROGRAM

## 2025-06-15 PROCEDURE — 63600175 PHARM REV CODE 636 W HCPCS: Performed by: STUDENT IN AN ORGANIZED HEALTH CARE EDUCATION/TRAINING PROGRAM

## 2025-06-15 PROCEDURE — 85025 COMPLETE CBC W/AUTO DIFF WBC: CPT | Performed by: STUDENT IN AN ORGANIZED HEALTH CARE EDUCATION/TRAINING PROGRAM

## 2025-06-15 PROCEDURE — 36415 COLL VENOUS BLD VENIPUNCTURE: CPT | Performed by: STUDENT IN AN ORGANIZED HEALTH CARE EDUCATION/TRAINING PROGRAM

## 2025-06-15 PROCEDURE — 21400001 HC TELEMETRY ROOM

## 2025-06-15 PROCEDURE — 82040 ASSAY OF SERUM ALBUMIN: CPT | Performed by: STUDENT IN AN ORGANIZED HEALTH CARE EDUCATION/TRAINING PROGRAM

## 2025-06-15 PROCEDURE — 86920 COMPATIBILITY TEST SPIN: CPT | Performed by: STUDENT IN AN ORGANIZED HEALTH CARE EDUCATION/TRAINING PROGRAM

## 2025-06-15 PROCEDURE — 25000003 PHARM REV CODE 250: Performed by: HOSPITALIST

## 2025-06-15 PROCEDURE — 36415 COLL VENOUS BLD VENIPUNCTURE: CPT | Performed by: NURSE PRACTITIONER

## 2025-06-15 PROCEDURE — 83735 ASSAY OF MAGNESIUM: CPT | Performed by: STUDENT IN AN ORGANIZED HEALTH CARE EDUCATION/TRAINING PROGRAM

## 2025-06-15 PROCEDURE — 36430 TRANSFUSION BLD/BLD COMPNT: CPT

## 2025-06-15 PROCEDURE — 83010 ASSAY OF HAPTOGLOBIN QUANT: CPT | Performed by: STUDENT IN AN ORGANIZED HEALTH CARE EDUCATION/TRAINING PROGRAM

## 2025-06-15 PROCEDURE — 83735 ASSAY OF MAGNESIUM: CPT | Performed by: NURSE PRACTITIONER

## 2025-06-15 PROCEDURE — 85045 AUTOMATED RETICULOCYTE COUNT: CPT | Performed by: STUDENT IN AN ORGANIZED HEALTH CARE EDUCATION/TRAINING PROGRAM

## 2025-06-15 PROCEDURE — 85060 BLOOD SMEAR INTERPRETATION: CPT | Mod: ,,, | Performed by: STUDENT IN AN ORGANIZED HEALTH CARE EDUCATION/TRAINING PROGRAM

## 2025-06-15 PROCEDURE — 99232 SBSQ HOSP IP/OBS MODERATE 35: CPT | Mod: ,,, | Performed by: NURSE PRACTITIONER

## 2025-06-15 PROCEDURE — 25000003 PHARM REV CODE 250: Performed by: INTERNAL MEDICINE

## 2025-06-15 PROCEDURE — 83615 LACTATE (LD) (LDH) ENZYME: CPT | Performed by: STUDENT IN AN ORGANIZED HEALTH CARE EDUCATION/TRAINING PROGRAM

## 2025-06-15 RX ORDER — MAGNESIUM SULFATE HEPTAHYDRATE 40 MG/ML
2 INJECTION, SOLUTION INTRAVENOUS ONCE
Status: COMPLETED | OUTPATIENT
Start: 2025-06-15 | End: 2025-06-15

## 2025-06-15 RX ORDER — HYDROCODONE BITARTRATE AND ACETAMINOPHEN 500; 5 MG/1; MG/1
TABLET ORAL
Status: DISCONTINUED | OUTPATIENT
Start: 2025-06-15 | End: 2025-06-17 | Stop reason: HOSPADM

## 2025-06-15 RX ORDER — LANOLIN ALCOHOL/MO/W.PET/CERES
400 CREAM (GRAM) TOPICAL ONCE
Status: COMPLETED | OUTPATIENT
Start: 2025-06-15 | End: 2025-06-15

## 2025-06-15 RX ADMIN — PREDNISONE 15 MG: 5 TABLET ORAL at 08:06

## 2025-06-15 RX ADMIN — SODIUM ZIRCONIUM CYCLOSILICATE 10 G: 5 POWDER, FOR SUSPENSION ORAL at 09:06

## 2025-06-15 RX ADMIN — SODIUM BICARBONATE 1300 MG: 650 TABLET ORAL at 08:06

## 2025-06-15 RX ADMIN — SODIUM ZIRCONIUM CYCLOSILICATE 10 G: 5 POWDER, FOR SUSPENSION ORAL at 08:06

## 2025-06-15 RX ADMIN — HYDROXYCHLOROQUINE SULFATE 400 MG: 200 TABLET, FILM COATED ORAL at 08:06

## 2025-06-15 RX ADMIN — SODIUM BICARBONATE 1300 MG: 650 TABLET ORAL at 09:06

## 2025-06-15 RX ADMIN — SODIUM BICARBONATE 1300 MG: 650 TABLET ORAL at 03:06

## 2025-06-15 RX ADMIN — MYCOPHENOLATE MOFETIL 1500 MG: 500 TABLET, FILM COATED ORAL at 08:06

## 2025-06-15 RX ADMIN — FERROUS SULFATE TAB 325 MG (65 MG ELEMENTAL FE) 1 EACH: 325 (65 FE) TAB at 08:06

## 2025-06-15 RX ADMIN — SODIUM ZIRCONIUM CYCLOSILICATE 10 G: 5 POWDER, FOR SUSPENSION ORAL at 03:06

## 2025-06-15 RX ADMIN — Medication 400 MG: at 11:06

## 2025-06-15 RX ADMIN — MAGNESIUM SULFATE HEPTAHYDRATE 2 G: 40 INJECTION, SOLUTION INTRAVENOUS at 05:06

## 2025-06-15 RX ADMIN — PANTOPRAZOLE SODIUM 40 MG: 40 TABLET, DELAYED RELEASE ORAL at 08:06

## 2025-06-15 RX ADMIN — CALCIUM CARBONATE (ANTACID) CHEW TAB 500 MG 500 MG: 500 CHEW TAB at 08:06

## 2025-06-15 RX ADMIN — SODIUM BICARBONATE: 84 INJECTION, SOLUTION INTRAVENOUS at 11:06

## 2025-06-15 RX ADMIN — MYCOPHENOLATE MOFETIL 1500 MG: 500 TABLET, FILM COATED ORAL at 09:06

## 2025-06-15 NOTE — PROGRESS NOTES
[facility]  Progress Note    Patient Name: Maikel Deleon  MRN: [id number]  Patient Class: IP- Inpatient   Admission Date: 6/12/2025  Length of Stay: 2 days  Attending Physician: Christopher Campbell DO  Primary Care Provider: Elaina Primary Doctor        Subjective     Principal Problem:Acute kidney injury superimposed on chronic kidney disease        HPI:  Maikel Deleon is a 25 y.o. male with a PMH  has a past medical history of Marijuana use and Systemic lupus erythematosus, organ or system involvement unspecified. who presented to the ED directed by his rheumatologist after outpatient labs revealed significant anemia and AMBER.  Patient with recently diagnosed with lupus nephritis back in October 2024 in his been noncompliant with home medications.  Patient was last seen by Dr. Giron from Nephrology on 5/12/25 and was reported to be progressing nicely towards remission and stressed importance of adherence and Dr. Correia from Rheumatology yesterday who documented patient was more adherent to his medications however, patient reported to me he has not taken any of his medications since June 5 secondary to potential side affects.  Patient main complaint includes increased tiredness/fatigue as well as lower extremity swelling but reported all other review of systems negative except as noted above including negative epistaxis, hemoptysis, hematemesis, hematuria, melena, or hematochezia.  Initial workup in the ED revealed patient to be afebrile without leukocytosis, hemodynamically stable, H/H 5.2/15.8, potassium 5.7, BUN 60, creatinine/GFR 4.4/18, calcium 7.4, phosphorus 6.1, and chest x-ray negative for acute findings.  Patient admitted to Hospital Medicine inpatient for continued medical management.  Nephrology consulted and awaiting further evaluation/recommendations.    PCP: Elaina, Primary Doctor      Overview/Hospital Course:  6/12-6/13: Admitted to Hospital Medicine  for evaluation of anemia and AMBER on CKD concerns.  Underwent 2u pRbc transfusion with improvement in anemia.  Nephrology consulted for AMBER concerns, evaluation noting possibly related to intravascular volume depletion due to low urine sodium versus anemia.  Started on Lokelma, IV fluids and furosemide trial for hyperkalemia.    6/14:  Mild improvement in renal function.  Hyperkalemia resolved.    Interval History: No acute events overnight, afebrile, hemodynamically stable. Mild improvement in renal function. Hyperkalemia resolved.  Denies any acute concerns.    Vital Signs (Most Recent):  Temp: 98.1 °F (36.7 °C) (06/14/25 1526)  Pulse: 83 (06/14/25 1557)  Resp: (!) 98 (06/14/25 1526)  BP: 123/78 (06/14/25 1526)  SpO2: 99 % (06/14/25 1526) Vital Signs (24h Range):  Temp:  [97.6 °F (36.4 °C)-98.1 °F (36.7 °C)] 98.1 °F (36.7 °C)  Pulse:  [54-83] 83  Resp:  [16-98] 98  SpO2:  [99 %-100 %] 99 %  BP: (121-131)/(74-86) 123/78     Weight: 76.9 kg (169 lb 8.5 oz)  Body mass index is 25.04 kg/m².    Intake/Output Summary (Last 24 hours) at 6/14/2025 1912  Last data filed at 6/14/2025 1600  Gross per 24 hour   Intake 680 ml   Output 400 ml   Net 280 ml         Physical Exam  Vitals and nursing note reviewed.   Constitutional:       General: He is not in acute distress.     Appearance: He is not ill-appearing, toxic-appearing or diaphoretic.   HENT:      Head: Normocephalic and atraumatic.      Mouth/Throat:      Mouth: Mucous membranes are moist.   Eyes:      General: No scleral icterus.        Right eye: No discharge.         Left eye: No discharge.   Cardiovascular:      Rate and Rhythm: Normal rate and regular rhythm.      Heart sounds: Normal heart sounds.   Pulmonary:      Effort: Pulmonary effort is normal. No respiratory distress.      Breath sounds: No wheezing, rhonchi or rales.   Abdominal:      General: Bowel sounds are normal. There is no distension.      Tenderness: There is no abdominal tenderness. There is no  guarding or rebound.   Musculoskeletal:         General: Swelling present.      Cervical back: No rigidity.      Right lower leg: Edema present.      Left lower leg: Edema present.   Skin:     General: Skin is warm and dry.      Coloration: Skin is not jaundiced.   Neurological:      Mental Status: He is alert and oriented to person, place, and time. Mental status is at baseline.   Psychiatric:         Mood and Affect: Mood normal.         Behavior: Behavior normal.             Significant Labs: All pertinent labs within the past 24 hours have been reviewed.   Recent Labs   Lab 06/14/25  0455 06/14/25  0805 06/14/25  1205   * 135* 133*   K 5.2* 5.2* 4.7   * 111* 109   CO2 16* 16* 17*   BUN 70* 67* 68*   CREATININE 4.2* 4.0* 3.9*   GLU 81 73 83   ANIONGAP 8 8 7*     Recent Labs   Lab 06/12/25  2107 06/13/25  0600 06/14/25  0455   AST 21 19 15   ALT 6* 7* 8*   ALKPHOS 78 60 67   BILITOT 0.6 0.7 1.3*   ALBUMIN 1.1* 0.9* 0.9*     POCT Glucose:   Recent Labs   Lab 06/14/25  0258 06/14/25  0358 06/14/25  1124   POCTGLUCOSE 84 106 100    Recent Labs   Lab 06/13/25  0600 06/13/25  1159 06/14/25  0454   WBC 4.18 6.14 6.97   HGB 5.1* 8.5* 7.2*   HCT 15.0* 25.1* 20.9*   * 175 155   GRAN  --  4.1  --                      Significant Imaging:  I have reviewed all pertinent imaging results/findings within the past 24 hours.   X-Ray Chest AP Portable   Final Result    No acute findings.      Finalized on: 6/12/2025 9:10 PM By:  Santo Sanchez MD   [facility]# [id number]      2025-06-12 21:12:11.879     [facility]          Inpatient Medications:    Scheduled Meds:   calcium carbonate  500 mg Oral Daily    ferrous sulfate  1 tablet Oral Daily    hydroxychloroquine  400 mg Oral Daily    mycophenolate  1,500 mg Oral BID    pantoprazole  40 mg Oral Daily    predniSONE  15 mg Oral Daily    sodium bicarbonate  1,300 mg Oral TID    sodium zirconium cyclosilicate  10 g Oral TID    voclosporin  23.7 mg Oral Daily       PRN  Meds:  Current Facility-Administered Medications:     0.9%  NaCl infusion (for blood administration), , Intravenous, Q24H PRN    acetaminophen, 650 mg, Rectal, Q6H PRN    acetaminophen, 650 mg, Oral, Q8H PRN    albuterol-ipratropium, 3 mL, Nebulization, Q6H PRN    aluminum-magnesium hydroxide-simethicone, 30 mL, Oral, QID PRN    [COMPLETED] calcium gluconate IVPB, 1 g, Intravenous, Once **AND** calcium gluconate IVPB, 1 g, Intravenous, Q10 Min PRN    dextrose 50%, 12.5 g, Intravenous, PRN    dextrose 50%, 25 g, Intravenous, PRN    [COMPLETED] dextrose 50%, 50 g, Intravenous, Once **AND** dextrose 50%, 25 g, Intravenous, PRN **AND** [COMPLETED] insulin regular, 0.1 Units/kg, Intravenous, Once    glucagon (human recombinant), 1 mg, Intramuscular, PRN    glucose, 16 g, Oral, PRN    glucose, 24 g, Oral, PRN    HYDROcodone-acetaminophen, 1 tablet, Oral, Q6H PRN    melatonin, 6 mg, Oral, Nightly PRN    morphine, 2 mg, Intravenous, Q4H PRN    naloxone, 0.02 mg, Intravenous, PRN    ondansetron, 4 mg, Intravenous, Q8H PRN    promethazine, 25 mg, Oral, Q6H PRN    senna-docusate, 1 tablet, Oral, BID PRN    sodium chloride 0.9%, 10 mL, Intravenous, Q12H PRN            Assessment & Plan  Acute kidney injury superimposed on chronic kidney disease  Baseline creatinine is 1.38. Most recent creatinine and eGFR are listed below.  Recent Labs     06/14/25  0455 06/14/25  0805 06/14/25  1205   CREATININE 4.2* 4.0* 3.9*   EGFRNORACEVR 19* 20* 21*     Plan  -AMBER is currently undergoing medical management  -Avoid nephrotoxins and renally dose meds for GFR listed above  -Monitor urine output, serial BMP, and adjust therapy as needed  -f/u nephrology  recs    Hyperkalemia  Hyperkalemia is likely due to AMBER.The patients most recent potassium results are listed below.  Recent Labs     06/14/25  0455 06/14/25  0805 06/14/25  1205   K 5.2* 5.2* 4.7     Plan  -Monitor for arrhythmias with EKG and/or continuous telemetry.   -Treat the  "hyperkalemia with fluids and hyperkalemia protocol as needed  -Monitor potassium: Daily  -The patient's hyperkalemia is resolved    Lupus nephritis  Patient recently diagnosed with lupus nephritis back in October 2024.  Has been followed both by Nephrology and Rheumatology outpatient last seen by Dr. Giron from Nephrology on 5/12/25 and Dr. Correia from Rheumatology yesterday with Assessment/Plan noted below.      Dr. 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 He was on MMF, plaquenil and prednisone and recently, started on voclosporin by rheum, but he had stopped all medications for about 6 weeks or so. He is back on everything now for about a week. He feels ok- no evidence of systemic lupus symptoms but UA still with proteinuria and some microscopic hematuria. Also his creatinine is a bit up since last time but still 1.38 mg/dL  Plan:  - DsDNA now negative. Complements normal.   - uric acid is elevated to 8.6  - cont cellcept 1500 mg PO BID, plaquenil 200 mg daily and prednisone 15 mg daily for now  - started on voclosporin with rheumatology in march but has only been on it for about a week  - will see rheum next month- if proteinuria/renal function stable, can consider tapering prednisone to 10 mg daily  - has PCP locally- should follow up in [place] with them too  - will start lisinopril 5 mg daily to help with proteinuria.   - he appears to be progressinly nicely towards remission but I discussed with him the importance of staying on his regimen to completion in order to get him into remission, otherwise likelihood of flare up is high  - no edema noted- stopped furosemide    Dr. Correia from Rheumatology 6/12/2025    "Lupus Nephritis Class III/V  Timeline as above in regards to patient's lupus nephritis diagnosis.  Unfortunately, patient had been off therapy for 6 weeks due to nausea/vomiting that he initially thought " "secondary to his medications. Notes that he will intermittently have nausea even off the medications and was encouraged to restart therapy with Neurology. Protein/Cr ratio significant with 10g of proteinuria and lower extremity edema. I have counseled and admonished the patient at stopping his medications and informed him to please continue them regardless and inform me if there is any issues.   Plan:  -Check labs to r/o medication side effects CBC, CMP, CRP and Sed Rate.  -JOSEPH, C3/C4 and UA UPCr.  -C/w PDN 15 mg every day, HCQ 400mg every day, MMF 1500mg BID and full dose Voclosporin.  -Discussed adherence with meds and will rx Zofran   -PPI, Ca/ Vit D   -If UPCr still in nephrotic range will start Eliquis  -start Zofran as needed and PPI daily  -referral to GI for nausea and vomiting as this was present before starting therapy and could be secondary to obstruction"      Anemia due to chronic kidney disease  Anemia is likely due to chronic disease due to lupus nephritis. Most recent hemoglobin and hematocrit are listed below.  Recent Labs     06/13/25  0600 06/13/25  1159 06/14/25  0454   HGB 5.1* 8.5* 7.2*   HCT 15.0* 25.1* 20.9*     Plan  -Monitor serial CBC: Every 12 hours  -Transfuse PRBC if patient becomes hemodynamically unstable, symptomatic or H/H drops below 7/21.  Si  -Patient has received 2 units of PRBCs on 06/13/25  -Patient's anemia is currently undergoing medical management    Hypertension  Chronic, controlled.  Latest blood pressure and vitals reviewed-   Temp:  [97.6 °F (36.4 °C)-98.1 °F (36.7 °C)]   Pulse:  [54-83]   Resp:  [16-98]   BP: (121-131)/(74-86)   SpO2:  [99 %-100 %] .   Home meds for hypertension were reviewed and noted below.   Hypertension Medications              furosemide (LASIX) 40 MG tablet Take 1 tablet (40 mg total) by mouth once daily.    lisinopriL (PRINIVIL,ZESTRIL) 5 MG tablet Take 5 mg by mouth.     While in the hospital, will manage blood pressure as follows; Continue " home antihypertensive regimen    Will utilize p.r.n. blood pressure medication only if patient's blood pressure greater than  180/110 and he develops symptoms such as worsening chest pain or shortness of breath.      VTE Risk Mitigation (From admission, onward)           Ordered     IP VTE HIGH RISK PATIENT  Once         06/13/25 0018     Place sequential compression device  Until discontinued         06/13/25 0018     Reason for No Pharmacological VTE Prophylaxis  Once        Question:  Reasons:  Answer:  Physician Provided (leave comment)  Comment:  pending blood transfusion    06/13/25 0018                    Discharge Planning   LUIGI:      Code Status: Full Code   Medical Readiness for Discharge Date:   Discharge Plan A: Home            Christopher Campbell DO  Department of Hospital Medicine   [facility] - Telemetry ([facility])      Voice recognition software was used in the creation of this note/communication and any sound-alike/typographical errors which may have occurred despite initial review prior to signing should be taken in context when interpreting.  If such errors prevent a clear understanding of the note/communication, please contact the provider/office for clarification.

## 2025-06-15 NOTE — ASSESSMENT & PLAN NOTE
Baseline creatinine is 1.38. Most recent creatinine and eGFR are listed below.  Recent Labs     06/14/25  0455 06/14/25  0805 06/14/25  1205   CREATININE 4.2* 4.0* 3.9*   EGFRNORACEVR 19* 20* 21*     Plan  -AMBER is currently undergoing medical management  -Avoid nephrotoxins and renally dose meds for GFR listed above  -Monitor urine output, serial BMP, and adjust therapy as needed  -f/u nephrology  recs

## 2025-06-15 NOTE — ASSESSMENT & PLAN NOTE
Chronic, controlled.  Latest blood pressure and vitals reviewed-   Temp:  [97.4 °F (36.3 °C)-98.1 °F (36.7 °C)]   Pulse:  [58-81]   Resp:  [14-18]   BP: (121-136)/(58-86)   SpO2:  [97 %-100 %] .   Home meds for hypertension were reviewed and noted below.   Hypertension Medications              furosemide (LASIX) 40 MG tablet Take 1 tablet (40 mg total) by mouth once daily.    lisinopriL (PRINIVIL,ZESTRIL) 5 MG tablet Take 5 mg by mouth.     While in the hospital, will manage blood pressure as follows; Continue home antihypertensive regimen    Will utilize p.r.n. blood pressure medication only if patient's blood pressure greater than  180/110 and he develops symptoms such as worsening chest pain or shortness of breath.

## 2025-06-15 NOTE — ASSESSMENT & PLAN NOTE
Anemia is likely due to chronic disease due to lupus nephritis. Most recent hemoglobin and hematocrit are listed below.  Recent Labs     06/13/25  0600 06/13/25  1159 06/14/25  0454   HGB 5.1* 8.5* 7.2*   HCT 15.0* 25.1* 20.9*     Plan  -Monitor serial CBC: Every 12 hours  -Transfuse PRBC if patient becomes hemodynamically unstable, symptomatic or H/H drops below 7/21.  Si  -Patient has received 2 units of PRBCs on 06/13/25  -Patient's anemia is currently undergoing medical management

## 2025-06-15 NOTE — PROGRESS NOTES
Nephrology progress note        History of Present Illness     The patient is a 25 y.o. male with a hx of biopsy-proven lupus nephritis diagnosed October 2024 followed with [facility] Dr Giron in [place].  Patient has persistent proteinuria with an initial PCR of 5.9 g.  Treated with MMF, Plaquenil, prednisone. Voclosporin added per rheumatology.  Last seen per [facility] 05/12/2025 with a creatinine of 1.3 and a hemoglobin of 7.9.  At that visit he appeared to be close to remission per Nephrology notes.    Patient admitted to [facility] 6/12 with outpatient labs drawn per rheumatology which revealed significant anemia and worsening renal function.  Patient reportedly had stopped taking his medications since 06/05 due to fear of side effects.  Admit labs revealed a hemoglobin of 5.2, potassium 5.7, CO2 14, BUN/creatinine 60/4.2.  Denies NSAIDs. CXR negative.  Patient admitted per hospital medicine.  Renal consulted for AMBER on CKD.    Interval history:  No acute events overnight.  Patient continues to deny chest pain or shortness of breath.  Breathing comfortably on room air O2.  Improved appetite.  Making good urine.  Hemoglobin 6.9 noted defer to [facility] for PRBCs.  Multiple electrolyte issues-acidosis, hypocalcemia, hyperphosphatemia, hypomagnesemia.         PMHx:    Past Medical History:   Diagnosis Date    Marijuana use     Systemic lupus erythematosus, organ or system involvement unspecified        SocHx:    Social History[1]    FamHx:    Family History   Problem Relation Name Age of Onset    No Known Problems Mother      Hypertension Father      Sickle cell trait Sister      Heart failure Brother      Diabetes Brother         ROS:    CONSTITUTIONAL: negative for - chills, fever, or night sweats  CARDIOVASCULAR: negative for - chest pain, palpitations, tachycardia, or dyspnea on exertion  RESPIRATORY: negative for - cough, hemoptysis, shortness of breath, tachypnea, or wheezing  GASTROINTESTINAL:  "negative for - nausea, vomiting, constipation, diarrhea, abdominal pain, or change in bowel habits  GENITOURINARY:  negative for - incontinence, nocturia, dysuria, or sexual dysfunction   MUSCULOSKELETAL: negative for - joint stiffness, joint swelling, joint pain, or muscle pain   SKIN:  negative for jaundice, pruritus, rash, or lacerations  NEUROLOGIC: +weakness  ENDOCRINE: negative for - polydipsia, polyuria, galactorrhea, gynecomastia, hot flashes, or temperature intolerance  HEME/LYMPH: negative for - bleeding, hemorrhage, bruising, jaundice, or pallor     Health Status   Allergies:    is allergic to sulfa (sulfonamide antibiotics) and sulfamethoxazole-trimethoprim.    Current medications:   Current Medications[2]     Physical Examination   VS/Measurements   /84 (BP Location: Right arm, Patient Position: Lying)   Pulse 68   Temp 97.7 °F (36.5 °C) (Oral)   Resp 18   Ht 5' 9" (1.753 m)   Wt 79.3 kg (174 lb 13.2 oz)   SpO2 99%   BMI 25.82 kg/m²     Physical Exam  Cardiovascular:      Rate and Rhythm: Normal rate and regular rhythm.      Pulses: Normal pulses.      Heart sounds: Normal heart sounds.   Pulmonary:      Effort: Pulmonary effort is normal.      Breath sounds: Normal breath sounds.   Abdominal:      General: Abdomen is flat. Bowel sounds are normal.      Palpations: Abdomen is soft.   Musculoskeletal:      Cervical back: Neck supple.      Right lower leg: Edema present.      Left lower leg: Edema present.   Skin:     General: Skin is warm and dry.   Neurological:      Mental Status: He is alert and oriented to person, place, and time.   Psychiatric:         Mood and Affect: Mood normal.         Behavior: Behavior normal.            Review / Management   Laboratory Results   Today's Lab Results :    Recent Results (from the past 24 hours)   POCT glucose    Collection Time: 06/14/25 11:24 AM   Result Value Ref Range    POCT Glucose 100 70 - 110 mg/dL   Basic metabolic panel    Collection Time: " 06/14/25 12:05 PM   Result Value Ref Range    Sodium 133 (L) 136 - 145 mmol/L    Potassium 4.7 3.5 - 5.1 mmol/L    Chloride 109 95 - 110 mmol/L    CO2 17 (L) 23 - 29 mmol/L    Glucose 83 70 - 110 mg/dL    BUN 68 (H) 6 - 20 mg/dL    Creatinine 3.9 (H) 0.5 - 1.4 mg/dL    Calcium 7.1 (L) 8.7 - 10.5 mg/dL    Anion Gap 7 (L) 8 - 16 mmol/L    eGFR 21 (L) >60 mL/min/1.73/m2   Phosphorus    Collection Time: 06/15/25  4:49 AM   Result Value Ref Range    Phosphorus Level 6.5 (H) 2.7 - 4.5 mg/dL   Magnesium    Collection Time: 06/15/25  4:49 AM   Result Value Ref Range    Magnesium  1.5 (L) 1.6 - 2.6 mg/dL   Comprehensive Metabolic Panel    Collection Time: 06/15/25  4:49 AM   Result Value Ref Range    Sodium 133 (L) 136 - 145 mmol/L    Potassium 4.9 3.5 - 5.1 mmol/L    Chloride 110 95 - 110 mmol/L    CO2 16 (L) 23 - 29 mmol/L    Glucose 103 70 - 110 mg/dL    BUN 71 (H) 6 - 20 mg/dL    Creatinine 4.1 (H) 0.5 - 1.4 mg/dL    Calcium 6.7 (LL) 8.7 - 10.5 mg/dL    Protein Total 4.2 (L) 6.0 - 8.4 gm/dL    Albumin 0.9 (L) 3.5 - 5.2 g/dL    Bilirubin Total 0.7 0.1 - 1.0 mg/dL    ALP 62 40 - 150 unit/L    AST 13 11 - 45 unit/L    ALT 5 (L) 10 - 44 unit/L    Anion Gap 7 (L) 8 - 16 mmol/L    eGFR 19 (L) >60 mL/min/1.73/m2   CBC with Differential    Collection Time: 06/15/25  4:49 AM   Result Value Ref Range    WBC 6.40 3.90 - 12.70 K/uL    RBC 2.45 (L) 4.60 - 6.20 M/uL    HGB 6.9 (L) 14.0 - 18.0 gm/dL    HCT 20.4 (L) 40.0 - 54.0 %    MCV 83 82 - 98 fL    MCH 28.2 27.0 - 31.0 pg    MCHC 33.8 32.0 - 36.0 g/dL    RDW 15.5 (H) 11.5 - 14.5 %    Platelet Count 144 (L) 150 - 450 K/uL    MPV 10.0 9.2 - 12.9 fL    Nucleated RBC 0 <=0 /100 WBC    Neut % 76.3 (H) 38 - 73 %    Lymph % 16.4 (L) 18 - 48 %    Mono % 6.4 4 - 15 %    Eos % 0.0 <=8 %    Basophil % 0.0 <=1.9 %    Imm Grans % 0.9 (H) 0.0 - 0.5 %    Neut # 4.88 1.8 - 7.7 K/uL    Lymph # 1.05 1 - 4.8 K/uL    Mono # 0.41 0.3 - 1 K/uL    Eos # 0.00 <=0.5 K/uL    Baso # 0.00 <=0.2 K/uL    Imm  Grans # 0.06 (H) 0.00 - 0.04 K/uL        Impression and Plan   Diagnosis     AMBER on CKD.  Baseline creatinine 1.3-1.4.  Likely IVVD in the setting of severe anemia.  Admit creatinine 4.4 with slow improvement.  Patient back on lupus meds.  UA with 3+ protein and 2+ blood.  Fractional excretion of sodium 0.2% indicating prerenal.  CPK ok. BNP 11.  Patient made 240 cc of urine yesterday.  Dry based on FENA.  Change IV fluids to half-normal saline with bicarb.  We will check renal ultrasound.    Hypocalcemia.  Albumin 0.9.  Corrected calcium 9.1.    Hypomagnesemia.  Start Mag oxide    Anemia.  Hemoglobin 6.9.  Defer to  for PRBCs.      Hyperkalemia.  Persistent but improving.  Continue Lokelma.    Metabolic acidosis.  P.o. bicarb supplements, bicarb drip.    Lupus nephritis.  On MMF, Plaquenil, prednisone, Voclosporin    --check complements, double-stranded DNA.  May need steroids.    Hypertension.  Good control off meds.    Thrombocytopenia.  Mild.  Monitor.       .                [1]   Social History  Socioeconomic History    Marital status: Single   Tobacco Use    Smoking status: Former     Types: Cigarettes    Smokeless tobacco: Former   Vaping Use    Vaping status: Never Used   Substance and Sexual Activity    Alcohol use: Not Currently    Drug use: Yes     Types: Marijuana     Social Drivers of Health     Financial Resource Strain: Low Risk  (3/20/2025)    Received from [facility]    Overall Financial Resource Strain (CARDIA)     Difficulty of Paying Living Expenses: Not hard at all   Food Insecurity: No Food Insecurity (3/20/2025)    Received from [facility]    Hunger Vital Sign     Worried About Running Out of Food in the Last Year: Never true     Ran Out of Food in the Last Year: Never true   Transportation Needs: No Transportation Needs (3/20/2025)    Received from [facility]    PRAPARE - Transportation     Lack of Transportation (Medical): No     Lack of Transportation (Non-Medical): No   Physical  Activity: Inactive (3/20/2025)    Received from [facility]    Exercise Vital Sign     Days of Exercise per Week: 0 days     Minutes of Exercise per Session: 30 min   Stress: No Stress Concern Present (3/20/2025)    Received from [facility]    [employer] - Occupational Stress Questionnaire     Feeling of Stress : Not at all   Housing Stability: Unknown (3/20/2025)    Received from [facility]    Housing Stability Vital Sign     Unable to Pay for Housing in the Last Year: No   [2]   Current Facility-Administered Medications:     0.9%  NaCl infusion (for blood administration), , Intravenous, Q24H PRN, René Lewis Jr., MD, New Bag at 06/14/25 2148    acetaminophen suppository 650 mg, 650 mg, Rectal, Q6H PRN, Joni Vo MD    acetaminophen tablet 650 mg, 650 mg, Oral, Q8H PRN, Joni Vo MD    albuterol-ipratropium 2.5 mg-0.5 mg/3 mL nebulizer solution 3 mL, 3 mL, Nebulization, Q6H PRN, Joni Vo MD    aluminum-magnesium hydroxide-simethicone 200-200-20 mg/5 mL suspension 30 mL, 30 mL, Oral, QID PRN, Joni Vo MD    calcium carbonate 200 mg calcium (500 mg) chewable tablet 500 mg, 500 mg, Oral, Daily, Joni Vo MD, 500 mg at 06/15/25 0842    [COMPLETED] calcium gluconate 1 g in D5W 50 mL IVPB (MB+), 1 g, Intravenous, Once, Stopped at 06/13/25 2314 **AND** calcium gluconate 1 g in D5W 50 mL IVPB (MB+), 1 g, Intravenous, Q10 Min PRN, Christopher Campbell DO    dextrose 50% injection 12.5 g, 12.5 g, Intravenous, PRN, Joni Vo MD    dextrose 50% injection 25 g, 25 g, Intravenous, PRN, Joni Vo MD    ferrous sulfate tablet 1 each, 1 tablet, Oral, Daily, Joni Vo MD, 1 each at 06/15/25 0842    glucagon (human recombinant) injection 1 mg, 1 mg, Intramuscular, Gilda OCONNOR Clinton J., MD    glucose chewable tablet 16 g, 16 g, Oral, Gilda OCONNOR Clinton J., MD    glucose chewable tablet 24 g, 24 g, Oral,  PRN, Joni Vo MD    HYDROcodone-acetaminophen 5-325 mg per tablet 1 tablet, 1 tablet, Oral, Q6H PRN, Joni Vo MD    hydroxychloroquine tablet 400 mg, 400 mg, Oral, Daily, Joni Vo MD, 400 mg at 06/15/25 0841    melatonin tablet 6 mg, 6 mg, Oral, Nightly PRN, Joni Vo MD    morphine injection 2 mg, 2 mg, Intravenous, Q4H PRN, Joni Vo MD    mycophenolate tablet 1,500 mg, 1,500 mg, Oral, BID, Joni Vo MD, 1,500 mg at 06/15/25 0841    naloxone 0.4 mg/mL injection 0.02 mg, 0.02 mg, Intravenous, PRN, Joni Vo MD    ondansetron injection 4 mg, 4 mg, Intravenous, Q8H PRN, Joni Vo MD    pantoprazole EC tablet 40 mg, 40 mg, Oral, Daily, Joni Vo MD, 40 mg at 06/15/25 0842    predniSONE tablet 15 mg, 15 mg, Oral, Daily, Joni Vo MD, 15 mg at 06/15/25 0841    promethazine tablet 25 mg, 25 mg, Oral, Q6H PRN, Joni Vo MD    senna-docusate 8.6-50 mg per tablet 1 tablet, 1 tablet, Oral, BID PRN, Joni Vo MD    sodium bicarbonate tablet 1,300 mg, 1,300 mg, Oral, TID, Joni Vo MD, 1,300 mg at 06/15/25 0841    sodium chloride 0.9% flush 10 mL, 10 mL, Intravenous, Q12H PRN, Joni Vo MD    sodium zirconium cyclosilicate packet 10 g, 10 g, Oral, TID, Giovanni Mcguire NP, 10 g at 06/15/25 0841    voclosporin Cap 23.7 mg, 23.7 mg, Oral, Daily, Joni Vo MD, 23.7 mg at 06/15/25 0815

## 2025-06-15 NOTE — ASSESSMENT & PLAN NOTE
Chronic, controlled.  Latest blood pressure and vitals reviewed-   Temp:  [97.6 °F (36.4 °C)-98.1 °F (36.7 °C)]   Pulse:  [54-83]   Resp:  [16-98]   BP: (121-131)/(74-86)   SpO2:  [99 %-100 %] .   Home meds for hypertension were reviewed and noted below.   Hypertension Medications              furosemide (LASIX) 40 MG tablet Take 1 tablet (40 mg total) by mouth once daily.    lisinopriL (PRINIVIL,ZESTRIL) 5 MG tablet Take 5 mg by mouth.     While in the hospital, will manage blood pressure as follows; Continue home antihypertensive regimen    Will utilize p.r.n. blood pressure medication only if patient's blood pressure greater than  180/110 and he develops symptoms such as worsening chest pain or shortness of breath.

## 2025-06-15 NOTE — PLAN OF CARE
POC reviewed w/ patient. Pt verbalizes understanding of plan  Chart/Orders reviewed  All safety precautions in place; No falls this shift  Pt resting in bed  Pain controlled  Continuous rounding c bed in lowest position, side rails up, call light in reach  Will continue to monitor until the end of shift  Problem: Adult Inpatient Plan of Care  Goal: Plan of Care Review  Outcome: Progressing  Flowsheets (Taken 6/15/2025 0306)  Plan of Care Reviewed With: patient  Goal: Patient-Specific Goal (Individualized)  Outcome: Progressing  Flowsheets (Taken 6/15/2025 0306)  Individualized Care Needs: none  Anxieties, Fears or Concerns: none  Patient/Family-Specific Goals (Include Timeframe): none  Goal: Absence of Hospital-Acquired Illness or Injury  Outcome: Progressing  Goal: Optimal Comfort and Wellbeing  Outcome: Progressing  Goal: Readiness for Transition of Care  Outcome: Progressing

## 2025-06-15 NOTE — ASSESSMENT & PLAN NOTE
Baseline creatinine is 1.38. Most recent creatinine and eGFR are listed below.  Recent Labs     06/14/25  0805 06/14/25  1205 06/15/25  0449   CREATININE 4.0* 3.9* 4.1*   EGFRNORACEVR 20* 21* 19*     Plan  -AMBER is currently undergoing medical management  -Avoid nephrotoxins and renally dose meds for GFR listed above  -Monitor urine output, serial BMP, and adjust therapy as needed  -f/u nephrology  recs

## 2025-06-15 NOTE — PLAN OF CARE
A218/A218 CRUZITO  Maikel Deleon is a 25 y.o.male admitted on 6/12/2025 for Acute kidney injury superimposed on chronic kidney disease   Code Status: Full Code MRN: [id number]   Review of patient's allergies indicates:   Allergen Reactions    Sulfa (sulfonamide antibiotics)     Sulfamethoxazole-trimethoprim Other (See Comments)     Past Medical History:   Diagnosis Date    Marijuana use     Systemic lupus erythematosus, organ or system involvement unspecified       PRN meds    0.9%  NaCl infusion (for blood administration), , Q24H PRN  0.9%  NaCl infusion (for blood administration), , Q24H PRN  acetaminophen, 650 mg, Q6H PRN  acetaminophen, 650 mg, Q8H PRN  albuterol-ipratropium, 3 mL, Q6H PRN  calcium gluconate IVPB, 1 g, Q10 Min PRN  dextrose 50%, 12.5 g, PRN  dextrose 50%, 25 g, PRN  glucagon (human recombinant), 1 mg, PRN  glucose, 16 g, PRN  glucose, 24 g, PRN  HYDROcodone-acetaminophen, 1 tablet, Q6H PRN  melatonin, 6 mg, Nightly PRN  morphine, 2 mg, Q4H PRN  naloxone, 0.02 mg, PRN  ondansetron, 4 mg, Q8H PRN  promethazine, 25 mg, Q6H PRN  senna-docusate, 1 tablet, BID PRN  sodium chloride 0.9%, 10 mL, Q12H PRN      Chart check completed. Will continue plan of care.         Mayra Coma Scale Score: 15     Lead Monitored: Lead II Rhythm: normal sinus rhythm    Cardiac/Telemetry Box Number: [id number]  VTE Core Measure: (SCDs) Sequential compression device initiated/maintained Last Bowel Movement: 06/13/25  Diet Low Sodium (2 gm) Renal Non-Dialysis     Clinton Score: 23  Fall Risk Score: 12  Accucheck []   Freq?      Lines/Drains/Airways       Peripheral Intravenous Line  Duration                  Peripheral IV - Single Lumen 06/15/25 1424 20 G Posterior;Proximal;Right Forearm <1 day

## 2025-06-15 NOTE — ASSESSMENT & PLAN NOTE
"Patient recently diagnosed with lupus nephritis back in October 2024.  Has been followed both by Nephrology and Rheumatology outpatient last seen by Dr. Giron from Nephrology on 5/12/25 and Dr. Correia from Rheumatology yesterday with Assessment/Plan noted below.      Dr. 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 He was on MMF, plaquenil and prednisone and recently, started on voclosporin by rheum, but he had stopped all medications for about 6 weeks or so. He is back on everything now for about a week. He feels ok- no evidence of systemic lupus symptoms but UA still with proteinuria and some microscopic hematuria. Also his creatinine is a bit up since last time but still 1.38 mg/dL  Plan:  - DsDNA now negative. Complements normal.   - uric acid is elevated to 8.6  - cont cellcept 1500 mg PO BID, plaquenil 200 mg daily and prednisone 15 mg daily for now  - started on voclosporin with rheumatology in march but has only been on it for about a week  - will see rheum next month- if proteinuria/renal function stable, can consider tapering prednisone to 10 mg daily  - has PCP locally- should follow up in [place] with them too  - will start lisinopril 5 mg daily to help with proteinuria.   - he appears to be progressinly nicely towards remission but I discussed with him the importance of staying on his regimen to completion in order to get him into remission, otherwise likelihood of flare up is high  - no edema noted- stopped furosemide    Dr. Correia from Rheumatology 6/12/2025    "Lupus Nephritis Class III/V  Timeline as above in regards to patient's lupus nephritis diagnosis.  Unfortunately, patient had been off therapy for 6 weeks due to nausea/vomiting that he initially thought secondary to his medications. Notes that he will intermittently have nausea even off the medications and was encouraged to restart therapy with Neurology. " "Protein/Cr ratio significant with 10g of proteinuria and lower extremity edema. I have counseled and admonished the patient at stopping his medications and informed him to please continue them regardless and inform me if there is any issues.   Plan:  -Check labs to r/o medication side effects CBC, CMP, CRP and Sed Rate.  -JOSEPH, C3/C4 and UA UPCr.  -C/w PDN 15 mg every day, HCQ 400mg every day, MMF 1500mg BID and full dose Voclosporin.  -Discussed adherence with meds and will rx Zofran   -PPI, Ca/ Vit D   -If UPCr still in nephrotic range will start Eliquis  -start Zofran as needed and PPI daily  -referral to GI for nausea and vomiting as this was present before starting therapy and could be secondary to obstruction"      "

## 2025-06-15 NOTE — ASSESSMENT & PLAN NOTE
Hyperkalemia is likely due to AMBER.The patients most recent potassium results are listed below.  Recent Labs     06/14/25  0455 06/14/25  0805 06/14/25  1205   K 5.2* 5.2* 4.7     Plan  -Monitor for arrhythmias with EKG and/or continuous telemetry.   -Treat the hyperkalemia with fluids and hyperkalemia protocol as needed  -Monitor potassium: Daily  -The patient's hyperkalemia is resolved

## 2025-06-15 NOTE — SUBJECTIVE & OBJECTIVE
Interval History: No acute events overnight, afebrile, hemodynamically stable. Mild improvement in renal function. Hyperkalemia resolved.  Denies any acute concerns.    Vital Signs (Most Recent):  Temp: 98.1 °F (36.7 °C) (06/14/25 1526)  Pulse: 83 (06/14/25 1557)  Resp: (!) 98 (06/14/25 1526)  BP: 123/78 (06/14/25 1526)  SpO2: 99 % (06/14/25 1526) Vital Signs (24h Range):  Temp:  [97.6 °F (36.4 °C)-98.1 °F (36.7 °C)] 98.1 °F (36.7 °C)  Pulse:  [54-83] 83  Resp:  [16-98] 98  SpO2:  [99 %-100 %] 99 %  BP: (121-131)/(74-86) 123/78     Weight: 76.9 kg (169 lb 8.5 oz)  Body mass index is 25.04 kg/m².    Intake/Output Summary (Last 24 hours) at 6/14/2025 1912  Last data filed at 6/14/2025 1600  Gross per 24 hour   Intake 680 ml   Output 400 ml   Net 280 ml         Physical Exam  Vitals and nursing note reviewed.   Constitutional:       General: He is not in acute distress.     Appearance: He is not ill-appearing, toxic-appearing or diaphoretic.   HENT:      Head: Normocephalic and atraumatic.      Mouth/Throat:      Mouth: Mucous membranes are moist.   Eyes:      General: No scleral icterus.        Right eye: No discharge.         Left eye: No discharge.   Cardiovascular:      Rate and Rhythm: Normal rate and regular rhythm.      Heart sounds: Normal heart sounds.   Pulmonary:      Effort: Pulmonary effort is normal. No respiratory distress.      Breath sounds: No wheezing, rhonchi or rales.   Abdominal:      General: Bowel sounds are normal. There is no distension.      Tenderness: There is no abdominal tenderness. There is no guarding or rebound.   Musculoskeletal:         General: Swelling present.      Cervical back: No rigidity.      Right lower leg: Edema present.      Left lower leg: Edema present.   Skin:     General: Skin is warm and dry.      Coloration: Skin is not jaundiced.   Neurological:      Mental Status: He is alert and oriented to person, place, and time. Mental status is at baseline.   Psychiatric:          Mood and Affect: Mood normal.         Behavior: Behavior normal.             Significant Labs: All pertinent labs within the past 24 hours have been reviewed.   Recent Labs   Lab 06/14/25  0455 06/14/25  0805 06/14/25  1205   * 135* 133*   K 5.2* 5.2* 4.7   * 111* 109   CO2 16* 16* 17*   BUN 70* 67* 68*   CREATININE 4.2* 4.0* 3.9*   GLU 81 73 83   ANIONGAP 8 8 7*     Recent Labs   Lab 06/12/25  2107 06/13/25  0600 06/14/25  0455   AST 21 19 15   ALT 6* 7* 8*   ALKPHOS 78 60 67   BILITOT 0.6 0.7 1.3*   ALBUMIN 1.1* 0.9* 0.9*     POCT Glucose:   Recent Labs   Lab 06/14/25  0258 06/14/25  0358 06/14/25  1124   POCTGLUCOSE 84 106 100    Recent Labs   Lab 06/13/25  0600 06/13/25  1159 06/14/25  0454   WBC 4.18 6.14 6.97   HGB 5.1* 8.5* 7.2*   HCT 15.0* 25.1* 20.9*   * 175 155   GRAN  --  4.1  --                      Significant Imaging:  I have reviewed all pertinent imaging results/findings within the past 24 hours.   X-Ray Chest AP Portable   Final Result    No acute findings.      Finalized on: 6/12/2025 9:10 PM By:  Santo Sanchez MD   [facility]# [id number]      2025-06-12 21:12:11.879     [facility]          Inpatient Medications:    Scheduled Meds:   calcium carbonate  500 mg Oral Daily    ferrous sulfate  1 tablet Oral Daily    hydroxychloroquine  400 mg Oral Daily    mycophenolate  1,500 mg Oral BID    pantoprazole  40 mg Oral Daily    predniSONE  15 mg Oral Daily    sodium bicarbonate  1,300 mg Oral TID    sodium zirconium cyclosilicate  10 g Oral TID    voclosporin  23.7 mg Oral Daily       PRN Meds:  Current Facility-Administered Medications:     0.9%  NaCl infusion (for blood administration), , Intravenous, Q24H PRN    acetaminophen, 650 mg, Rectal, Q6H PRN    acetaminophen, 650 mg, Oral, Q8H PRN    albuterol-ipratropium, 3 mL, Nebulization, Q6H PRN    aluminum-magnesium hydroxide-simethicone, 30 mL, Oral, QID PRN    [COMPLETED] calcium gluconate IVPB, 1 g, Intravenous, Once **AND** calcium  gluconate IVPB, 1 g, Intravenous, Q10 Min PRN    dextrose 50%, 12.5 g, Intravenous, PRN    dextrose 50%, 25 g, Intravenous, PRN    [COMPLETED] dextrose 50%, 50 g, Intravenous, Once **AND** dextrose 50%, 25 g, Intravenous, PRN **AND** [COMPLETED] insulin regular, 0.1 Units/kg, Intravenous, Once    glucagon (human recombinant), 1 mg, Intramuscular, PRN    glucose, 16 g, Oral, PRN    glucose, 24 g, Oral, PRN    HYDROcodone-acetaminophen, 1 tablet, Oral, Q6H PRN    melatonin, 6 mg, Oral, Nightly PRN    morphine, 2 mg, Intravenous, Q4H PRN    naloxone, 0.02 mg, Intravenous, PRN    ondansetron, 4 mg, Intravenous, Q8H PRN    promethazine, 25 mg, Oral, Q6H PRN    senna-docusate, 1 tablet, Oral, BID PRN    sodium chloride 0.9%, 10 mL, Intravenous, Q12H PRN

## 2025-06-15 NOTE — ASSESSMENT & PLAN NOTE
"Patient recently diagnosed with lupus nephritis back in October 2024.  Has been followed both by Nephrology and Rheumatology outpatient last seen by Dr. Giron from Nephrology on 5/12/25 and Dr. Correia from Rheumatology yesterday with Assessment/Plan noted below.      Dr. 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 He was on MMF, plaquenil and prednisone and recently, started on voclosporin by rheum, but he had stopped all medications for about 6 weeks or so. He is back on everything now for about a week. He feels ok- no evidence of systemic lupus symptoms but UA still with proteinuria and some microscopic hematuria. Also his creatinine is a bit up since last time but still 1.38 mg/dL  Plan:  - DsDNA now negative. Complements normal.   - uric acid is elevated to 8.6  - cont cellcept 1500 mg PO BID, plaquenil 200 mg daily and prednisone 15 mg daily for now  - started on voclosporin with rheumatology in march but has only been on it for about a week  - will see rheum next month- if proteinuria/renal function stable, can consider tapering prednisone to 10 mg daily  - has PCP locally- should follow up in [place] with them too  - will start lisinopril 5 mg daily to help with proteinuria.   - he appears to be progressinly nicely towards remission but I discussed with him the importance of staying on his regimen to completion in order to get him into remission, otherwise likelihood of flare up is high  - no edema noted- stopped furosemide    Dr. Correia from Rheumatology 6/12/2025    "Lupus Nephritis Class III/V  Timeline as above in regards to patient's lupus nephritis diagnosis.  Unfortunately, patient had been off therapy for 6 weeks due to nausea/vomiting that he initially thought secondary to his medications. Notes that he will intermittently have nausea even off the medications and was encouraged to restart therapy with Neurology. " "Protein/Cr ratio significant with 10g of proteinuria and lower extremity edema. I have counseled and admonished the patient at stopping his medications and informed him to please continue them regardless and inform me if there is any issues.   Plan:  -Check labs to r/o medication side effects CBC, CMP, CRP and Sed Rate.  -JOSEPH, C3/C4 and UA UPCr.  -C/w PDN 15 mg every day, HCQ 400mg every day, MMF 1500mg BID and full dose Voclosporin.  -Discussed adherence with meds and will rx Zofran   -PPI, Ca/ Vit D   -If UPCr still in nephrotic range will start Eliquis  -start Zofran as needed and PPI daily  -referral to GI for nausea and vomiting as this was present before starting therapy and could be secondary to obstruction"      "

## 2025-06-15 NOTE — SUBJECTIVE & OBJECTIVE
Interval History: No acute events overnight, afebrile, hemodynamically stable. Electrolytes remained stable, but renal function remains concerning. Reports generalized swelling. Nephrology following, further evaluation with renal ultrasound nonacute.  Lab work pending for further evaluation given concerns for lupus etiology.    Vital Signs (Most Recent):  Temp: 98 °F (36.7 °C) (06/15/25 1811)  Pulse: 73 (06/15/25 1811)  Resp: 14 (06/15/25 1439)  BP: 130/78 (06/15/25 1811)  SpO2: 97 % (06/15/25 1811) Vital Signs (24h Range):  Temp:  [97.4 °F (36.3 °C)-98.1 °F (36.7 °C)] 98 °F (36.7 °C)  Pulse:  [58-81] 73  Resp:  [14-18] 14  SpO2:  [97 %-100 %] 97 %  BP: (121-136)/(58-86) 130/78     Weight: 79.3 kg (174 lb 13.2 oz)  Body mass index is 25.82 kg/m².  No intake or output data in the 24 hours ending 06/15/25 1831        Physical Exam  Vitals and nursing note reviewed.   Constitutional:       General: He is not in acute distress.     Appearance: He is not ill-appearing, toxic-appearing or diaphoretic.   HENT:      Head: Normocephalic and atraumatic.      Mouth/Throat:      Mouth: Mucous membranes are moist.   Eyes:      General: No scleral icterus.        Right eye: No discharge.         Left eye: No discharge.   Cardiovascular:      Rate and Rhythm: Normal rate and regular rhythm.      Heart sounds: Normal heart sounds.   Pulmonary:      Effort: Pulmonary effort is normal. No respiratory distress.      Breath sounds: No wheezing, rhonchi or rales.   Abdominal:      General: Bowel sounds are normal. There is no distension.      Tenderness: There is no abdominal tenderness. There is no guarding or rebound.   Musculoskeletal:         General: Swelling present.      Cervical back: No rigidity.      Right lower leg: Edema present.      Left lower leg: Edema present.   Skin:     General: Skin is warm and dry.      Coloration: Skin is not jaundiced.   Neurological:      Mental Status: He is alert and oriented to person, place,  and time. Mental status is at baseline.   Psychiatric:         Mood and Affect: Mood normal.         Behavior: Behavior normal.             Significant Labs: All pertinent labs within the past 24 hours have been reviewed.   Recent Labs   Lab 06/14/25  0805 06/14/25  1205 06/15/25  0449   * 133* 133*   K 5.2* 4.7 4.9   * 109 110   CO2 16* 17* 16*   BUN 67* 68* 71*   CREATININE 4.0* 3.9* 4.1*   GLU 73 83 103   ANIONGAP 8 7* 7*     Recent Labs   Lab 06/13/25  0600 06/14/25  0455 06/15/25  0449   AST 19 15 13   ALT 7* 8* 5*   ALKPHOS 60 67 62   BILITOT 0.7 1.3* 0.7   ALBUMIN 0.9* 0.9* 0.9*     POCT Glucose:   Recent Labs   Lab 06/14/25  0258 06/14/25  0358 06/14/25  1124   POCTGLUCOSE 84 106 100    Recent Labs   Lab 06/13/25  1159 06/14/25  0454 06/15/25  0449   WBC 6.14 6.97 6.40   HGB 8.5* 7.2* 6.9*   HCT 25.1* 20.9* 20.4*    155 144*   GRAN 4.1  --   --                      Significant Imaging:  I have reviewed all pertinent imaging results/findings within the past 24 hours.   US Retroperitoneal Complete   Final Result      1.  Normal bilateral renal ultrasound.  Negative for hydronephrosis.  Echogenicity of the kidneys is normal.   2.  Small right pleural effusion.         Finalized on: 6/15/2025 1:03 PM By:  Daryl Rivera MD   [facility]# [id number]      2025-06-15 13:05:15.238     [facility]      X-Ray Chest AP Portable   Final Result    No acute findings.      Finalized on: 6/12/2025 9:10 PM By:  Santo Sanchez MD   [facility]# [id number]      2025-06-12 21:12:11.879     [facility]          Inpatient Medications:    Scheduled Meds:   calcium carbonate  500 mg Oral Daily    ferrous sulfate  1 tablet Oral Daily    hydroxychloroquine  400 mg Oral Daily    magnesium sulfate 2 g IVPB  2 g Intravenous Once    mycophenolate  1,500 mg Oral BID    pantoprazole  40 mg Oral Daily    predniSONE  15 mg Oral Daily    sodium bicarbonate  1,300 mg Oral TID    sodium zirconium cyclosilicate  10 g Oral TID    voclosporin  23.7 mg Oral  Daily       PRN Meds:  Current Facility-Administered Medications:     0.9%  NaCl infusion (for blood administration), , Intravenous, Q24H PRN    0.9%  NaCl infusion (for blood administration), , Intravenous, Q24H PRN    acetaminophen, 650 mg, Rectal, Q6H PRN    acetaminophen, 650 mg, Oral, Q8H PRN    albuterol-ipratropium, 3 mL, Nebulization, Q6H PRN    [COMPLETED] calcium gluconate IVPB, 1 g, Intravenous, Once **AND** calcium gluconate IVPB, 1 g, Intravenous, Q10 Min PRN    dextrose 50%, 12.5 g, Intravenous, PRN    dextrose 50%, 25 g, Intravenous, PRN    glucagon (human recombinant), 1 mg, Intramuscular, PRN    glucose, 16 g, Oral, PRN    glucose, 24 g, Oral, PRN    HYDROcodone-acetaminophen, 1 tablet, Oral, Q6H PRN    melatonin, 6 mg, Oral, Nightly PRN    morphine, 2 mg, Intravenous, Q4H PRN    naloxone, 0.02 mg, Intravenous, PRN    ondansetron, 4 mg, Intravenous, Q8H PRN    promethazine, 25 mg, Oral, Q6H PRN    senna-docusate, 1 tablet, Oral, BID PRN    sodium chloride 0.9%, 10 mL, Intravenous, Q12H PRN

## 2025-06-15 NOTE — NURSING
I noticed pt's urinal still empty after providing pt a new urinal, asked pt if he voided. Pt has not voided since about 0400 am this morning. Pt's creatinine trending down. Dr. Campbell informed, orders received for bladder scan and to continue monitoring. Pt voided 400 ml, bladder scan result was 70 ml post void, Dr. Campbell informed. Pt reported he does not go to void just because he has the urge to sometimes. Pt educated on risks for urinary tract infection with urinary retention and encouraged not to hold his urine when he has the urge to void.

## 2025-06-15 NOTE — ASSESSMENT & PLAN NOTE
Hyperkalemia is likely due to AMBER.The patients most recent potassium results are listed below.  Recent Labs     06/14/25  0805 06/14/25  1205 06/15/25  0449   K 5.2* 4.7 4.9     Plan  -Monitor for arrhythmias with EKG and/or continuous telemetry.   -Treat the hyperkalemia with fluids and hyperkalemia protocol as needed  -Monitor potassium: Daily  -The patient's hyperkalemia is resolved

## 2025-06-15 NOTE — PROGRESS NOTES
[facility] ER Medicine  Progress Note    Patient Name: Maikel Deleon  MRN: [id number]  Patient Class: IP- Inpatient   Admission Date: 6/12/2025  Length of Stay: 3 days  Attending Physician: Christopher Campbell DO  Primary Care Provider: Elaina Primary Doctor        Subjective     Principal Problem:Acute kidney injury superimposed on chronic kidney disease        HPI:  Maikel Deleon is a 25 y.o. male with a PMH  has a past medical history of Marijuana use and Systemic lupus erythematosus, organ or system involvement unspecified. who presented to the ED directed by his rheumatologist after outpatient labs revealed significant anemia and AMBER.  Patient with recently diagnosed with lupus nephritis back in October 2024 in his been noncompliant with home medications.  Patient was last seen by Dr. Giron from Nephrology on 5/12/25 and was reported to be progressing nicely towards remission and stressed importance of adherence and Dr. Correia from Rheumatology yesterday who documented patient was more adherent to his medications however, patient reported to me he has not taken any of his medications since June 5 secondary to potential side affects.  Patient main complaint includes increased tiredness/fatigue as well as lower extremity swelling but reported all other review of systems negative except as noted above including negative epistaxis, hemoptysis, hematemesis, hematuria, melena, or hematochezia.  Initial workup in the ED revealed patient to be afebrile without leukocytosis, hemodynamically stable, H/H 5.2/15.8, potassium 5.7, BUN 60, creatinine/GFR 4.4/18, calcium 7.4, phosphorus 6.1, and chest x-ray negative for acute findings.  Patient admitted to Hospital Medicine inpatient for continued medical management.  Nephrology consulted and awaiting further evaluation/recommendations.    PCP: Elaina, Primary Doctor      Overview/Hospital Course:  6/12-6/13: Admitted to Hospital Medicine  for evaluation of anemia and AMBER on CKD concerns.  Underwent 2u pRbc transfusion with improvement in anemia.  Nephrology consulted for AMBER concerns, evaluation noting possibly related to intravascular volume depletion due to low urine sodium versus anemia.  Started on Lokelma, IV fluids and furosemide trial for hyperkalemia.    6/14:  Mild improvement in renal function.  Hyperkalemia resolved.  6/15:  Electrolytes remained stable, but renal function remains concerning.  Nephrology following, further evaluation with renal ultrasound nonacute.  Lab work pending for further evaluation given concerns for lupus etiology.    Interval History: No acute events overnight, afebrile, hemodynamically stable. Electrolytes remained stable, but renal function remains concerning. Reports generalized swelling. Nephrology following, further evaluation with renal ultrasound nonacute.  Lab work pending for further evaluation given concerns for lupus etiology. Hemoglobin downtrended to 6.9, so 1u pRbc transfused.     Vital Signs (Most Recent):  Temp: 98 °F (36.7 °C) (06/15/25 1811)  Pulse: 73 (06/15/25 1811)  Resp: 14 (06/15/25 1439)  BP: 130/78 (06/15/25 1811)  SpO2: 97 % (06/15/25 1811) Vital Signs (24h Range):  Temp:  [97.4 °F (36.3 °C)-98.1 °F (36.7 °C)] 98 °F (36.7 °C)  Pulse:  [58-81] 73  Resp:  [14-18] 14  SpO2:  [97 %-100 %] 97 %  BP: (121-136)/(58-86) 130/78     Weight: 79.3 kg (174 lb 13.2 oz)  Body mass index is 25.82 kg/m².  No intake or output data in the 24 hours ending 06/15/25 1831        Physical Exam  Vitals and nursing note reviewed.   Constitutional:       General: He is not in acute distress.     Appearance: He is not ill-appearing, toxic-appearing or diaphoretic.   HENT:      Head: Normocephalic and atraumatic.      Mouth/Throat:      Mouth: Mucous membranes are moist.   Eyes:      General: No scleral icterus.        Right eye: No discharge.         Left eye: No discharge.   Cardiovascular:      Rate and Rhythm:  Normal rate and regular rhythm.      Heart sounds: Normal heart sounds.   Pulmonary:      Effort: Pulmonary effort is normal. No respiratory distress.      Breath sounds: No wheezing, rhonchi or rales.   Abdominal:      General: Bowel sounds are normal. There is no distension.      Tenderness: There is no abdominal tenderness. There is no guarding or rebound.   Musculoskeletal:         General: Swelling present.      Cervical back: No rigidity.      Right lower leg: Edema present.      Left lower leg: Edema present.   Skin:     General: Skin is warm and dry.      Coloration: Skin is not jaundiced.   Neurological:      Mental Status: He is alert and oriented to person, place, and time. Mental status is at baseline.   Psychiatric:         Mood and Affect: Mood normal.         Behavior: Behavior normal.             Significant Labs: All pertinent labs within the past 24 hours have been reviewed.   Recent Labs   Lab 06/14/25  0805 06/14/25  1205 06/15/25  0449   * 133* 133*   K 5.2* 4.7 4.9   * 109 110   CO2 16* 17* 16*   BUN 67* 68* 71*   CREATININE 4.0* 3.9* 4.1*   GLU 73 83 103   ANIONGAP 8 7* 7*     Recent Labs   Lab 06/13/25  0600 06/14/25  0455 06/15/25  0449   AST 19 15 13   ALT 7* 8* 5*   ALKPHOS 60 67 62   BILITOT 0.7 1.3* 0.7   ALBUMIN 0.9* 0.9* 0.9*     POCT Glucose:   Recent Labs   Lab 06/14/25  0258 06/14/25  0358 06/14/25  1124   POCTGLUCOSE 84 106 100    Recent Labs   Lab 06/13/25  1159 06/14/25  0454 06/15/25  0449   WBC 6.14 6.97 6.40   HGB 8.5* 7.2* 6.9*   HCT 25.1* 20.9* 20.4*    155 144*   GRAN 4.1  --   --                      Significant Imaging:  I have reviewed all pertinent imaging results/findings within the past 24 hours.   US Retroperitoneal Complete   Final Result      1.  Normal bilateral renal ultrasound.  Negative for hydronephrosis.  Echogenicity of the kidneys is normal.   2.  Small right pleural effusion.         Finalized on: 6/15/2025 1:03 PM By:  Daryl Rivera MD    [facility]# [id number]      2025-06-15 13:05:15.238     [facility]      X-Ray Chest AP Portable   Final Result    No acute findings.      Finalized on: 6/12/2025 9:10 PM By:  Santo Sanchez MD   [facility]# [id number]      2025-06-12 21:12:11.879     [facility]          Inpatient Medications:    Scheduled Meds:   calcium carbonate  500 mg Oral Daily    ferrous sulfate  1 tablet Oral Daily    hydroxychloroquine  400 mg Oral Daily    magnesium sulfate 2 g IVPB  2 g Intravenous Once    mycophenolate  1,500 mg Oral BID    pantoprazole  40 mg Oral Daily    predniSONE  15 mg Oral Daily    sodium bicarbonate  1,300 mg Oral TID    sodium zirconium cyclosilicate  10 g Oral TID    voclosporin  23.7 mg Oral Daily       PRN Meds:  Current Facility-Administered Medications:     0.9%  NaCl infusion (for blood administration), , Intravenous, Q24H PRN    0.9%  NaCl infusion (for blood administration), , Intravenous, Q24H PRN    acetaminophen, 650 mg, Rectal, Q6H PRN    acetaminophen, 650 mg, Oral, Q8H PRN    albuterol-ipratropium, 3 mL, Nebulization, Q6H PRN    [COMPLETED] calcium gluconate IVPB, 1 g, Intravenous, Once **AND** calcium gluconate IVPB, 1 g, Intravenous, Q10 Min PRN    dextrose 50%, 12.5 g, Intravenous, PRN    dextrose 50%, 25 g, Intravenous, PRN    glucagon (human recombinant), 1 mg, Intramuscular, PRN    glucose, 16 g, Oral, PRN    glucose, 24 g, Oral, PRN    HYDROcodone-acetaminophen, 1 tablet, Oral, Q6H PRN    melatonin, 6 mg, Oral, Nightly PRN    morphine, 2 mg, Intravenous, Q4H PRN    naloxone, 0.02 mg, Intravenous, PRN    ondansetron, 4 mg, Intravenous, Q8H PRN    promethazine, 25 mg, Oral, Q6H PRN    senna-docusate, 1 tablet, Oral, BID PRN    sodium chloride 0.9%, 10 mL, Intravenous, Q12H PRN            Assessment & Plan  Acute kidney injury superimposed on chronic kidney disease  Baseline creatinine is 1.38. Most recent creatinine and eGFR are listed below.  Recent Labs     06/14/25  0805 06/14/25  1205 06/15/25  0449   CREATININE  4.0* 3.9* 4.1*   EGFRNORACEVR 20* 21* 19*     Plan  -AMBER is currently undergoing medical management  -Avoid nephrotoxins and renally dose meds for GFR listed above  -Monitor urine output, serial BMP, and adjust therapy as needed  -f/u nephrology  recs    Hyperkalemia  Hyperkalemia is likely due to AMBER.The patients most recent potassium results are listed below.  Recent Labs     06/14/25  0805 06/14/25  1205 06/15/25  0449   K 5.2* 4.7 4.9     Plan  -Monitor for arrhythmias with EKG and/or continuous telemetry.   -Treat the hyperkalemia with fluids and hyperkalemia protocol as needed  -Monitor potassium: Daily  -The patient's hyperkalemia is resolved    Lupus nephritis  Patient recently diagnosed with lupus nephritis back in October 2024.  Has been followed both by Nephrology and Rheumatology outpatient last seen by Dr. Giron from Nephrology on 5/12/25 and Dr. Correia from Rheumatology yesterday with Assessment/Plan noted below.      Dr. 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 He was on MMF, plaquenil and prednisone and recently, started on voclosporin by rheum, but he had stopped all medications for about 6 weeks or so. He is back on everything now for about a week. He feels ok- no evidence of systemic lupus symptoms but UA still with proteinuria and some microscopic hematuria. Also his creatinine is a bit up since last time but still 1.38 mg/dL  Plan:  - DsDNA now negative. Complements normal.   - uric acid is elevated to 8.6  - cont cellcept 1500 mg PO BID, plaquenil 200 mg daily and prednisone 15 mg daily for now  - started on voclosporin with rheumatology in march but has only been on it for about a week  - will see rheum next month- if proteinuria/renal function stable, can consider tapering prednisone to 10 mg daily  - has PCP locally- should follow up in [place] with them too  - will start lisinopril 5 mg daily to help  "with proteinuria.   - he appears to be progressinly nicely towards remission but I discussed with him the importance of staying on his regimen to completion in order to get him into remission, otherwise likelihood of flare up is high  - no edema noted- stopped furosemide    Dr. Correia from Rheumatology 6/12/2025    "Lupus Nephritis Class III/V  Timeline as above in regards to patient's lupus nephritis diagnosis.  Unfortunately, patient had been off therapy for 6 weeks due to nausea/vomiting that he initially thought secondary to his medications. Notes that he will intermittently have nausea even off the medications and was encouraged to restart therapy with Neurology. Protein/Cr ratio significant with 10g of proteinuria and lower extremity edema. I have counseled and admonished the patient at stopping his medications and informed him to please continue them regardless and inform me if there is any issues.   Plan:  -Check labs to r/o medication side effects CBC, CMP, CRP and Sed Rate.  -JOSEPH, C3/C4 and UA UPCr.  -C/w PDN 15 mg every day, HCQ 400mg every day, MMF 1500mg BID and full dose Voclosporin.  -Discussed adherence with meds and will rx Zofran   -PPI, Ca/ Vit D   -If UPCr still in nephrotic range will start Eliquis  -start Zofran as needed and PPI daily  -referral to GI for nausea and vomiting as this was present before starting therapy and could be secondary to obstruction"      Anemia due to chronic kidney disease  Anemia is likely due to chronic disease due to lupus nephritis. Most recent hemoglobin and hematocrit are listed below.  Recent Labs     06/13/25  1159 06/14/25  0454 06/15/25  0449   HGB 8.5* 7.2* 6.9*   HCT 25.1* 20.9* 20.4*     Plan  -Monitor serial CBC: Every 12 hours  -Transfuse PRBC if patient becomes hemodynamically unstable, symptomatic or H/H drops below 7/21.  Si  -Patient has received 2 units of PRBCs on 06/13/25  -Patient's anemia is currently undergoing medical " management    Hypertension  Chronic, controlled.  Latest blood pressure and vitals reviewed-   Temp:  [97.4 °F (36.3 °C)-98.1 °F (36.7 °C)]   Pulse:  [58-81]   Resp:  [14-18]   BP: (121-136)/(58-86)   SpO2:  [97 %-100 %] .   Home meds for hypertension were reviewed and noted below.   Hypertension Medications              furosemide (LASIX) 40 MG tablet Take 1 tablet (40 mg total) by mouth once daily.    lisinopriL (PRINIVIL,ZESTRIL) 5 MG tablet Take 5 mg by mouth.     While in the hospital, will manage blood pressure as follows; Continue home antihypertensive regimen    Will utilize p.r.n. blood pressure medication only if patient's blood pressure greater than  180/110 and he develops symptoms such as worsening chest pain or shortness of breath.      VTE Risk Mitigation (From admission, onward)           Ordered     IP VTE HIGH RISK PATIENT  Once         06/13/25 0018     Place sequential compression device  Until discontinued         06/13/25 0018     Reason for No Pharmacological VTE Prophylaxis  Once        Question:  Reasons:  Answer:  Physician Provided (leave comment)  Comment:  pending blood transfusion    06/13/25 0018                    Discharge Planning   LUIGI:      Code Status: Full Code   Medical Readiness for Discharge Date:   Discharge Plan A: Home              Christopher Campbell DO  Department of Hospital Medicine   [facility] - Telemetry ([facility])    Voice recognition software was used in the creation of this note/communication and any sound-alike/typographical errors which may have occurred despite initial review prior to signing should be taken in context when interpreting.  If such errors prevent a clear understanding of the note/communication, please contact the provider/office for clarification.

## 2025-06-16 LAB
ABSOLUTE EOSINOPHIL (OHS): 0.01 K/UL
ABSOLUTE MONOCYTE (OHS): 0.58 K/UL (ref 0.3–1)
ABSOLUTE NEUTROPHIL COUNT (OHS): 5.54 K/UL (ref 1.8–7.7)
ALBUMIN SERPL BCP-MCNC: 1 G/DL (ref 3.5–5.2)
ALBUMIN SERPL BCP-MCNC: 1.1 G/DL (ref 3.5–5.2)
ALP SERPL-CCNC: 70 UNIT/L (ref 40–150)
ALT SERPL W/O P-5'-P-CCNC: 5 UNIT/L (ref 10–44)
ANION GAP (OHS): 9 MMOL/L (ref 8–16)
ANION GAP (OHS): 9 MMOL/L (ref 8–16)
AST SERPL-CCNC: 15 UNIT/L (ref 11–45)
BASOPHILS # BLD AUTO: 0 K/UL
BASOPHILS NFR BLD AUTO: 0 %
BILIRUB SERPL-MCNC: 0.8 MG/DL (ref 0.1–1)
BUN SERPL-MCNC: 74 MG/DL (ref 6–20)
BUN SERPL-MCNC: 75 MG/DL (ref 6–20)
CALCIUM SERPL-MCNC: 6.6 MG/DL (ref 8.7–10.5)
CALCIUM SERPL-MCNC: 6.7 MG/DL (ref 8.7–10.5)
CHLORIDE SERPL-SCNC: 106 MMOL/L (ref 95–110)
CHLORIDE SERPL-SCNC: 107 MMOL/L (ref 95–110)
CO2 SERPL-SCNC: 18 MMOL/L (ref 23–29)
CO2 SERPL-SCNC: 18 MMOL/L (ref 23–29)
CREAT SERPL-MCNC: 3.1 MG/DL (ref 0.5–1.4)
CREAT SERPL-MCNC: 3.1 MG/DL (ref 0.5–1.4)
CRP SERPL-MCNC: 1.48 MG/L
DSDNA ANTIBODY (OHS): NORMAL
DSDNA ANTIBODY TITER (OHS): NORMAL
ERYTHROCYTE [DISTWIDTH] IN BLOOD BY AUTOMATED COUNT: 15.3 % (ref 11.5–14.5)
GFR SERPLBLD CREATININE-BSD FMLA CKD-EPI: 27 ML/MIN/1.73/M2
GFR SERPLBLD CREATININE-BSD FMLA CKD-EPI: 27 ML/MIN/1.73/M2
GLUCOSE SERPL-MCNC: 76 MG/DL (ref 70–110)
GLUCOSE SERPL-MCNC: 77 MG/DL (ref 70–110)
HAPTOGLOB SERPL-MCNC: 147 MG/DL (ref 30–250)
HCT VFR BLD AUTO: 24.7 % (ref 40–54)
HGB BLD-MCNC: 8.6 GM/DL (ref 14–18)
IMM GRANULOCYTES # BLD AUTO: 0.07 K/UL (ref 0–0.04)
IMM GRANULOCYTES NFR BLD AUTO: 0.9 % (ref 0–0.5)
LYMPHOCYTES # BLD AUTO: 1.19 K/UL (ref 1–4.8)
MAGNESIUM SERPL-MCNC: 1.9 MG/DL (ref 1.6–2.6)
MCH RBC QN AUTO: 28.8 PG (ref 27–31)
MCHC RBC AUTO-ENTMCNC: 34.8 G/DL (ref 32–36)
MCV RBC AUTO: 83 FL (ref 82–98)
NUCLEATED RBC (/100WBC) (OHS): 0 /100 WBC
PATHOLOGIST REVIEW - CBC/DIFF (OHS): NORMAL
PHOSPHATE SERPL-MCNC: 5.4 MG/DL (ref 2.7–4.5)
PHOSPHATE SERPL-MCNC: 5.6 MG/DL (ref 2.7–4.5)
PLATELET # BLD AUTO: 196 K/UL (ref 150–450)
PMV BLD AUTO: 10.1 FL (ref 9.2–12.9)
POTASSIUM SERPL-SCNC: 3.8 MMOL/L (ref 3.5–5.1)
POTASSIUM SERPL-SCNC: 4.5 MMOL/L (ref 3.5–5.1)
PROT SERPL-MCNC: 4.6 GM/DL (ref 6–8.4)
RBC # BLD AUTO: 2.99 M/UL (ref 4.6–6.2)
RELATIVE EOSINOPHIL (OHS): 0.1 %
RELATIVE LYMPHOCYTE (OHS): 16.1 % (ref 18–48)
RELATIVE MONOCYTE (OHS): 7.8 % (ref 4–15)
RELATIVE NEUTROPHIL (OHS): 75.1 % (ref 38–73)
SODIUM SERPL-SCNC: 133 MMOL/L (ref 136–145)
SODIUM SERPL-SCNC: 134 MMOL/L (ref 136–145)
WBC # BLD AUTO: 7.39 K/UL (ref 3.9–12.7)

## 2025-06-16 PROCEDURE — 86141 C-REACTIVE PROTEIN HS: CPT | Performed by: INTERNAL MEDICINE

## 2025-06-16 PROCEDURE — 25000003 PHARM REV CODE 250: Performed by: INTERNAL MEDICINE

## 2025-06-16 PROCEDURE — 63600175 PHARM REV CODE 636 W HCPCS: Performed by: HOSPITALIST

## 2025-06-16 PROCEDURE — 21400001 HC TELEMETRY ROOM

## 2025-06-16 PROCEDURE — 83735 ASSAY OF MAGNESIUM: CPT | Performed by: STUDENT IN AN ORGANIZED HEALTH CARE EDUCATION/TRAINING PROGRAM

## 2025-06-16 PROCEDURE — 36415 COLL VENOUS BLD VENIPUNCTURE: CPT | Performed by: INTERNAL MEDICINE

## 2025-06-16 PROCEDURE — 82040 ASSAY OF SERUM ALBUMIN: CPT | Performed by: NURSE PRACTITIONER

## 2025-06-16 PROCEDURE — 25000003 PHARM REV CODE 250: Performed by: NURSE PRACTITIONER

## 2025-06-16 PROCEDURE — 84100 ASSAY OF PHOSPHORUS: CPT | Performed by: NURSE PRACTITIONER

## 2025-06-16 PROCEDURE — 85025 COMPLETE CBC W/AUTO DIFF WBC: CPT | Performed by: STUDENT IN AN ORGANIZED HEALTH CARE EDUCATION/TRAINING PROGRAM

## 2025-06-16 PROCEDURE — 86225 DNA ANTIBODY NATIVE: CPT | Performed by: INTERNAL MEDICINE

## 2025-06-16 PROCEDURE — 25000003 PHARM REV CODE 250: Performed by: HOSPITALIST

## 2025-06-16 PROCEDURE — 36415 COLL VENOUS BLD VENIPUNCTURE: CPT | Performed by: NURSE PRACTITIONER

## 2025-06-16 PROCEDURE — 80053 COMPREHEN METABOLIC PANEL: CPT | Performed by: STUDENT IN AN ORGANIZED HEALTH CARE EDUCATION/TRAINING PROGRAM

## 2025-06-16 PROCEDURE — 99232 SBSQ HOSP IP/OBS MODERATE 35: CPT | Mod: FS,,, | Performed by: INTERNAL MEDICINE

## 2025-06-16 RX ADMIN — SODIUM BICARBONATE 1300 MG: 650 TABLET ORAL at 08:06

## 2025-06-16 RX ADMIN — FERROUS SULFATE TAB 325 MG (65 MG ELEMENTAL FE) 1 EACH: 325 (65 FE) TAB at 08:06

## 2025-06-16 RX ADMIN — MYCOPHENOLATE MOFETIL 1500 MG: 500 TABLET, FILM COATED ORAL at 09:06

## 2025-06-16 RX ADMIN — MYCOPHENOLATE MOFETIL 1500 MG: 500 TABLET, FILM COATED ORAL at 08:06

## 2025-06-16 RX ADMIN — SODIUM BICARBONATE 1300 MG: 650 TABLET ORAL at 09:06

## 2025-06-16 RX ADMIN — PREDNISONE 15 MG: 5 TABLET ORAL at 08:06

## 2025-06-16 RX ADMIN — SODIUM ZIRCONIUM CYCLOSILICATE 10 G: 5 POWDER, FOR SUSPENSION ORAL at 09:06

## 2025-06-16 RX ADMIN — CALCIUM CARBONATE (ANTACID) CHEW TAB 500 MG 500 MG: 500 CHEW TAB at 08:06

## 2025-06-16 RX ADMIN — SODIUM BICARBONATE: 84 INJECTION, SOLUTION INTRAVENOUS at 08:06

## 2025-06-16 RX ADMIN — SODIUM BICARBONATE 1300 MG: 650 TABLET ORAL at 03:06

## 2025-06-16 RX ADMIN — PANTOPRAZOLE SODIUM 40 MG: 40 TABLET, DELAYED RELEASE ORAL at 08:06

## 2025-06-16 RX ADMIN — SODIUM ZIRCONIUM CYCLOSILICATE 10 G: 5 POWDER, FOR SUSPENSION ORAL at 08:06

## 2025-06-16 RX ADMIN — HYDROXYCHLOROQUINE SULFATE 400 MG: 200 TABLET, FILM COATED ORAL at 08:06

## 2025-06-16 RX ADMIN — SODIUM BICARBONATE: 84 INJECTION, SOLUTION INTRAVENOUS at 12:06

## 2025-06-16 RX ADMIN — SODIUM ZIRCONIUM CYCLOSILICATE 10 G: 5 POWDER, FOR SUSPENSION ORAL at 03:06

## 2025-06-16 NOTE — PROGRESS NOTES
Nephrology progress note        History of Present Illness     The patient is a 25 y.o. male with a hx of biopsy-proven lupus nephritis diagnosed October 2024 followed with [facility] Giron in [place].  Patient has persistent proteinuria with an initial PCR of 5.9 g.  Treated with MMF, Plaquenil, prednisone. Voclosporin added per rheumatology.  Last seen per [facility] 05/12/2025 with a creatinine of 1.3 and a hemoglobin of 7.9.  At that visit he appeared to be close to remission per Nephrology notes.    Patient admitted to [facility] 6/12 with outpatient labs drawn per rheumatology which revealed significant anemia and worsening renal function.  Patient reportedly had stopped taking his medications since 06/05 due to fear of side effects.  Admit labs revealed a hemoglobin of 5.2, potassium 5.7, CO2 14, BUN/creatinine 60/4.2.  Denies NSAIDs. CXR negative.  Patient admitted per hospital medicine.  Renal consulted for AMBER on CKD.    Interval history:  Sitting up in bed no acute distress.  Denies chest pain or shortness of breath.  Breathing comfortably on room air O2.  Patient states loose stools have improved.  Creatinine much better overnight with good urine output and no overt signs of uremia.      PMHx:    Past Medical History:   Diagnosis Date    Marijuana use     Systemic lupus erythematosus, organ or system involvement unspecified        SocHx:    Social History[1]    FamHx:    Family History   Problem Relation Name Age of Onset    No Known Problems Mother      Hypertension Father      Sickle cell trait Sister      Heart failure Brother      Diabetes Brother         ROS:    CONSTITUTIONAL: negative for - chills, fever, or night sweats  CARDIOVASCULAR: negative for - chest pain, palpitations, tachycardia, or dyspnea on exertion  RESPIRATORY: negative for - cough, hemoptysis, shortness of breath, tachypnea, or wheezing  GASTROINTESTINAL: negative for - nausea, vomiting, constipation, diarrhea,  "abdominal pain, or change in bowel habits  GENITOURINARY:  negative for - incontinence, nocturia, dysuria, or sexual dysfunction   MUSCULOSKELETAL: negative for - joint stiffness, joint swelling, joint pain, or muscle pain   SKIN:  negative for jaundice, pruritus, rash, or lacerations  NEUROLOGIC: +weakness  ENDOCRINE: negative for - polydipsia, polyuria, galactorrhea, gynecomastia, hot flashes, or temperature intolerance  HEME/LYMPH: negative for - bleeding, hemorrhage, bruising, jaundice, or pallor     Health Status   Allergies:    is allergic to sulfa (sulfonamide antibiotics) and sulfamethoxazole-trimethoprim.    Current medications:   Current Medications[2]     Physical Examination   VS/Measurements   BP (!) 144/79 (Patient Position: Lying)   Pulse 65   Temp 97.7 °F (36.5 °C) (Oral)   Resp 19   Ht 5' 9" (1.753 m)   Wt 79.3 kg (174 lb 13.2 oz)   SpO2 100%   BMI 25.82 kg/m²     Physical Exam  Cardiovascular:      Rate and Rhythm: Normal rate and regular rhythm.      Pulses: Normal pulses.      Heart sounds: Normal heart sounds.   Pulmonary:      Effort: Pulmonary effort is normal.      Breath sounds: Normal breath sounds.   Abdominal:      General: Abdomen is flat. Bowel sounds are normal.      Palpations: Abdomen is soft.   Musculoskeletal:      Cervical back: Neck supple.      Right lower leg: Edema present.      Left lower leg: Edema present.   Skin:     General: Skin is warm and dry.   Neurological:      Mental Status: He is alert and oriented to person, place, and time.   Psychiatric:         Mood and Affect: Mood normal.         Behavior: Behavior normal.            Review / Management   Laboratory Results   Today's Lab Results :    Recent Results (from the past 24 hours)   Magnesium    Collection Time: 06/15/25  9:57 AM   Result Value Ref Range    Magnesium  1.5 (L) 1.6 - 2.6 mg/dL   C3 Complement    Collection Time: 06/15/25  9:57 AM   Result Value Ref Range    C3 Complement 41 (L) 50 - 180 mg/dL "   C4 Complement    Collection Time: 06/15/25  9:57 AM   Result Value Ref Range    C4 Complement 8 (L) 11 - 44 mg/dL   Haptoglobin    Collection Time: 06/15/25  8:13 PM   Result Value Ref Range    Haptoglobin 147 30 - 250 mg/dL   Reticulocytes    Collection Time: 06/15/25  8:13 PM   Result Value Ref Range    Retic Count, Automated 1.8 0.4 - 2.0 %   Lactate dehydrogenase    Collection Time: 06/15/25  8:13 PM   Result Value Ref Range    Lactate Dehydrogenase 226 110 - 260 U/L   CBC with Differential    Collection Time: 06/15/25  8:13 PM   Result Value Ref Range    WBC 7.24 3.90 - 12.70 K/uL    RBC 2.86 (L) 4.60 - 6.20 M/uL    HGB 8.3 (L) 14.0 - 18.0 gm/dL    HCT 23.2 (L) 40.0 - 54.0 %    MCV 81 (L) 82 - 98 fL    MCH 29.0 27.0 - 31.0 pg    MCHC 35.8 32.0 - 36.0 g/dL    RDW 15.2 (H) 11.5 - 14.5 %    Platelet Count 171 150 - 450 K/uL    MPV 10.0 9.2 - 12.9 fL    Nucleated RBC 0 <=0 /100 WBC    Neut % 86.6 (H) 38 - 73 %    Lymph % 7.6 (L) 18 - 48 %    Mono % 4.6 4 - 15 %    Eos % 0.0 <=8 %    Basophil % 0.1 <=1.9 %    Imm Grans % 1.1 (H) 0.0 - 0.5 %    Neut # 6.27 1.8 - 7.7 K/uL    Lymph # 0.55 (L) 1 - 4.8 K/uL    Mono # 0.33 0.3 - 1 K/uL    Eos # 0.00 <=0.5 K/uL    Baso # 0.01 <=0.2 K/uL    Imm Grans # 0.08 (H) 0.00 - 0.04 K/uL   Magnesium    Collection Time: 06/16/25  4:51 AM   Result Value Ref Range    Magnesium  1.9 1.6 - 2.6 mg/dL   Comprehensive Metabolic Panel    Collection Time: 06/16/25  4:51 AM   Result Value Ref Range    Sodium 134 (L) 136 - 145 mmol/L    Potassium 4.5 3.5 - 5.1 mmol/L    Chloride 107 95 - 110 mmol/L    CO2 18 (L) 23 - 29 mmol/L    Glucose 77 70 - 110 mg/dL    BUN 75 (H) 6 - 20 mg/dL    Creatinine 3.1 (H) 0.5 - 1.4 mg/dL    Calcium 6.7 (LL) 8.7 - 10.5 mg/dL    Protein Total 4.6 (L) 6.0 - 8.4 gm/dL    Albumin 1.1 (L) 3.5 - 5.2 g/dL    Bilirubin Total 0.8 0.1 - 1.0 mg/dL    ALP 70 40 - 150 unit/L    AST 15 11 - 45 unit/L    ALT 5 (L) 10 - 44 unit/L    Anion Gap 9 8 - 16 mmol/L    eGFR 27 (L) >60  mL/min/1.73/m2   Phosphorus    Collection Time: 06/16/25  4:51 AM   Result Value Ref Range    Phosphorus Level 5.6 (H) 2.7 - 4.5 mg/dL   CBC with Differential    Collection Time: 06/16/25  4:51 AM   Result Value Ref Range    WBC 7.39 3.90 - 12.70 K/uL    RBC 2.99 (L) 4.60 - 6.20 M/uL    HGB 8.6 (L) 14.0 - 18.0 gm/dL    HCT 24.7 (L) 40.0 - 54.0 %    MCV 83 82 - 98 fL    MCH 28.8 27.0 - 31.0 pg    MCHC 34.8 32.0 - 36.0 g/dL    RDW 15.3 (H) 11.5 - 14.5 %    Platelet Count 196 150 - 450 K/uL    MPV 10.1 9.2 - 12.9 fL    Nucleated RBC 0 <=0 /100 WBC    Neut % 75.1 (H) 38 - 73 %    Lymph % 16.1 (L) 18 - 48 %    Mono % 7.8 4 - 15 %    Eos % 0.1 <=8 %    Basophil % 0.0 <=1.9 %    Imm Grans % 0.9 (H) 0.0 - 0.5 %    Neut # 5.54 1.8 - 7.7 K/uL    Lymph # 1.19 1 - 4.8 K/uL    Mono # 0.58 0.3 - 1 K/uL    Eos # 0.01 <=0.5 K/uL    Baso # 0.00 <=0.2 K/uL    Imm Grans # 0.07 (H) 0.00 - 0.04 K/uL   Renal Function Panel    Collection Time: 06/16/25  7:03 AM   Result Value Ref Range    Sodium 133 (L) 136 - 145 mmol/L    Potassium 3.8 3.5 - 5.1 mmol/L    Chloride 106 95 - 110 mmol/L    CO2 18 (L) 23 - 29 mmol/L    Glucose 76 70 - 110 mg/dL    BUN 74 (H) 6 - 20 mg/dL    Creatinine 3.1 (H) 0.5 - 1.4 mg/dL    Calcium 6.6 (LL) 8.7 - 10.5 mg/dL    Albumin 1.0 (L) 3.5 - 5.2 g/dL    Phosphorus Level 5.4 (H) 2.7 - 4.5 mg/dL    Anion Gap 9 8 - 16 mmol/L    eGFR 27 (L) >60 mL/min/1.73/m2        Impression and Plan   Diagnosis     AMBER on CKD.  Baseline creatinine 1.3-1.4.  Likely IVVD in the setting of severe anemia. Admit creatinine 4.4 with slow improvement.  Patient back on lupus meds.  UA with 3+ protein and 2+ blood.  Fractional excretion of sodium 0.2% indicating prerenal.  CPK ok. BNP 11.  Urine output improving. Dry based on FENA.  Continue IV fluids with bicarb.  Renal ultrasound negative.    Hypocalcemia.  Albumin 0.9.  Corrected calcium 9.0.    Hypomagnesemia.  On Mag oxide    Anemia.  Hemoglobin 6.9.  Defer to  for PRBCs.       Hyperkalemia.  Persistent but improving.  Continue Lokelma.    Metabolic acidosis.  P.o. bicarb supplements, bicarb drip.    Lupus nephritis.  On MMF, Plaquenil, prednisone, Voclosporin    --complements low, check double-stranded DNA.  May need steroids.    Hypertension.  Good control off meds.    Thrombocytopenia.  Mild.  Monitor.       .                  [1]   Social History  Socioeconomic History    Marital status: Single   Tobacco Use    Smoking status: Former     Types: Cigarettes    Smokeless tobacco: Former   Vaping Use    Vaping status: Never Used   Substance and Sexual Activity    Alcohol use: Not Currently    Drug use: Yes     Types: Marijuana     Social Drivers of Health     Financial Resource Strain: Low Risk  (3/20/2025)    Received from [facility]    Overall Financial Resource Strain (CARDIA)     Difficulty of Paying Living Expenses: Not hard at all   Food Insecurity: No Food Insecurity (3/20/2025)    Received from [facility]    Hunger Vital Sign     Worried About Running Out of Food in the Last Year: Never true     Ran Out of Food in the Last Year: Never true   Transportation Needs: No Transportation Needs (3/20/2025)    Received from [facility]    PRAPARE - Transportation     Lack of Transportation (Medical): No     Lack of Transportation (Non-Medical): No   Physical Activity: Inactive (3/20/2025)    Received from [facility]    Exercise Vital Sign     Days of Exercise per Week: 0 days     Minutes of Exercise per Session: 30 min   Stress: No Stress Concern Present (3/20/2025)    Received from [facility]    [employer] - Occupational Stress Questionnaire     Feeling of Stress : Not at all   Housing Stability: Unknown (3/20/2025)    Received from [facility]    Housing Stability Vital Sign     Unable to Pay for Housing in the Last Year: No   [2]   Current Facility-Administered Medications:     0.9%  NaCl infusion (for blood administration), , Intravenous, Q24H PRN,  René Lewis Jr., MD, New Bag at 06/14/25 2148    0.9%  NaCl infusion (for blood administration), , Intravenous, Q24H PRN, Christopher Campbell,     acetaminophen suppository 650 mg, 650 mg, Rectal, Q6H PRN, Joni Vo MD    acetaminophen tablet 650 mg, 650 mg, Oral, Q8H PRN, Joni Vo MD    albuterol-ipratropium 2.5 mg-0.5 mg/3 mL nebulizer solution 3 mL, 3 mL, Nebulization, Q6H PRN, Joni Vo MD    calcium carbonate 200 mg calcium (500 mg) chewable tablet 500 mg, 500 mg, Oral, Daily, Joni Vo MD, 500 mg at 06/16/25 0814    [COMPLETED] calcium gluconate 1 g in D5W 50 mL IVPB (MB+), 1 g, Intravenous, Once, Stopped at 06/13/25 2314 **AND** calcium gluconate 1 g in D5W 50 mL IVPB (MB+), 1 g, Intravenous, Q10 Min PRN, Christopher Campbell,     dextrose 50% injection 12.5 g, 12.5 g, Intravenous, PRN, Joni Vo MD    dextrose 50% injection 25 g, 25 g, Intravenous, PRN, Joni Vo MD    ferrous sulfate tablet 1 each, 1 tablet, Oral, Daily, Joni Vo MD, 1 each at 06/16/25 0814    glucagon (human recombinant) injection 1 mg, 1 mg, Intramuscular, PRN, Joni Vo MD    glucose chewable tablet 16 g, 16 g, Oral, PRN, Joni oV MD    glucose chewable tablet 24 g, 24 g, Oral, PRN, Joni Vo MD    HYDROcodone-acetaminophen 5-325 mg per tablet 1 tablet, 1 tablet, Oral, Q6H PRN, Joni Vo MD    hydroxychloroquine tablet 400 mg, 400 mg, Oral, Daily, Joni Vo MD, 400 mg at 06/16/25 0813    melatonin tablet 6 mg, 6 mg, Oral, Nightly PRN, Joni Vo MD    morphine injection 2 mg, 2 mg, Intravenous, Q4H PRN, Joni Vo MD    mycophenolate tablet 1,500 mg, 1,500 mg, Oral, BID, Joni Vo MD, 1,500 mg at 06/16/25 0815    naloxone 0.4 mg/mL injection 0.02 mg, 0.02 mg, Intravenous, PRN, Joni Vo MD    ondansetron injection 4 mg, 4 mg, Intravenous, Q8H PRN, Gilda  Joni VERDUGO MD    pantoprazole EC tablet 40 mg, 40 mg, Oral, Daily, Joni Vo MD, 40 mg at 06/16/25 0814    predniSONE tablet 15 mg, 15 mg, Oral, Daily, Joni Vo MD, 15 mg at 06/16/25 0814    promethazine tablet 25 mg, 25 mg, Oral, Q6H PRN, Joni Vo MD    senna-docusate 8.6-50 mg per tablet 1 tablet, 1 tablet, Oral, BID PRN, Joni Vo MD    sodium bicarbonate 75 mEq in 0.45% NaCl 1,075 mL infusion, , Intravenous, Continuous, Davey Flores MD, Last Rate: 150 mL/hr at 06/16/25 0819, New Bag at 06/16/25 0819    sodium bicarbonate tablet 1,300 mg, 1,300 mg, Oral, TID, Joni Vo MD, 1,300 mg at 06/16/25 0814    sodium chloride 0.9% flush 10 mL, 10 mL, Intravenous, Q12H PRN, Joni Vo MD    sodium zirconium cyclosilicate packet 10 g, 10 g, Oral, TID, Giovanni Mcguire NP, 10 g at 06/16/25 0813    voclosporin Cap 23.7 mg, 23.7 mg, Oral, Daily, Joni Vo MD, 23.7 mg at 06/16/25 0815

## 2025-06-16 NOTE — PROVIDER TRANSFER
Outside Transfer Acceptance Note / [facility] Referral Center    Referring facility:    Referring provider: CHUN BERMUDEZ  Accepting facility: [facility]  Accepting provider: TIGIST PINEDA  Reason for transfer: Higher Level of Care   Transfer diagnosis: Lupus nephriis  Transfer specialty requested: Rheumatology  Transfer specialty notified: No  Transfer level: NUMBER 1-5: 2  Isolation status: No active isolations   Admission class or status: IP- Inpatient    Narrative     25-y/o m with h/o biopsy-proven lupus nephritis (October 2024) followed by [facility] admitted to  on 06/12 due to outpatient labs showing worsening renal function and anemia.  Patient has been taking mycophenolate, Plaquenil, prednisone and Voclosporin which he has not been taking since 06/05 due to concern about side effects.  On admission /60, HR 80, T 98.5°.  WBC 6.39, Hb 5.2 (BL 10.6> 7.9), Plts 209, Na 136, K 5.7, CO2 14, BUN 62, Cr 4.4 (BL 1.38), phosphorus 6.1, U/A RBC 16, WBC 6, bacteria rare.  Urine protein 3+, Urine Na < 20,.  Patient was restarted on his home meds and was given pRBC x3.  Labs today Hb 5.2 > 8.6, Cr 4.4> 3.1 Transfer requested for rheumatology.  Transfer diagnosis: Lupus nephritis, A/C renal failure,      Instructions      Claudio Isabel-  Admit to Hospital Medicine  Upon patient arrival to floor, please send SecureChat to [facility] or call extension [phone] (if no answer, do NOT leave a callback number after the beep, rather please send a SecureChat to [facility]), for Hospital Medicine admit team assignment and for additional admit orders for the patient.  Do not page the attending physician associated with the patient on arrival (this physician may not be on duty at the time of arrival).  Rather, always send a SecureChat to [facility] or call [phone] to reach the triage physician for orders and team assignment.

## 2025-06-17 ENCOUNTER — HOSPITAL ENCOUNTER (INPATIENT)
Facility: HOSPITAL | Age: 25
LOS: 3 days | Discharge: HOME | DRG: 546 | End: 2025-06-20
Attending: HOSPITALIST | Admitting: HOSPITALIST
Payer: MEDICAID

## 2025-06-17 VITALS
DIASTOLIC BLOOD PRESSURE: 67 MMHG | RESPIRATION RATE: 17 BRPM | SYSTOLIC BLOOD PRESSURE: 128 MMHG | BODY MASS INDEX: 25.89 KG/M2 | OXYGEN SATURATION: 100 % | TEMPERATURE: 98 F | WEIGHT: 174.81 LBS | HEIGHT: 69 IN | HEART RATE: 86 BPM

## 2025-06-17 DIAGNOSIS — R80.9 NEPHROTIC RANGE PROTEINURIA: ICD-10-CM

## 2025-06-17 DIAGNOSIS — R07.9 CHEST PAIN: ICD-10-CM

## 2025-06-17 DIAGNOSIS — N18.31 ANEMIA DUE TO STAGE 3A CHRONIC KIDNEY DISEASE: Primary | ICD-10-CM

## 2025-06-17 DIAGNOSIS — N18.9 ACUTE KIDNEY INJURY SUPERIMPOSED ON CHRONIC KIDNEY DISEASE: ICD-10-CM

## 2025-06-17 DIAGNOSIS — D63.1 ANEMIA DUE TO STAGE 3A CHRONIC KIDNEY DISEASE: Primary | ICD-10-CM

## 2025-06-17 DIAGNOSIS — N17.9 ACUTE KIDNEY INJURY SUPERIMPOSED ON CHRONIC KIDNEY DISEASE: ICD-10-CM

## 2025-06-17 DIAGNOSIS — M32.14 LUPUS NEPHRITIS: ICD-10-CM

## 2025-06-17 LAB
ABSOLUTE EOSINOPHIL (OHS): 0 K/UL
ABSOLUTE MONOCYTE (OHS): 0.58 K/UL (ref 0.3–1)
ABSOLUTE NEUTROPHIL COUNT (OHS): 4 K/UL (ref 1.8–7.7)
ALBUMIN SERPL BCP-MCNC: 1 G/DL (ref 3.5–5.2)
ALP SERPL-CCNC: 66 UNIT/L (ref 40–150)
ALT SERPL W/O P-5'-P-CCNC: 6 UNIT/L (ref 10–44)
ANION GAP (OHS): 7 MMOL/L (ref 8–16)
AST SERPL-CCNC: 12 UNIT/L (ref 11–45)
BASOPHILS # BLD AUTO: 0.01 K/UL
BASOPHILS NFR BLD AUTO: 0.2 %
BILIRUB SERPL-MCNC: 1 MG/DL (ref 0.1–1)
BUN SERPL-MCNC: 73 MG/DL (ref 6–20)
CALCIUM SERPL-MCNC: 6.6 MG/DL (ref 8.7–10.5)
CHLORIDE SERPL-SCNC: 107 MMOL/L (ref 95–110)
CO2 SERPL-SCNC: 22 MMOL/L (ref 23–29)
CREAT SERPL-MCNC: 2.4 MG/DL (ref 0.5–1.4)
ERYTHROCYTE [DISTWIDTH] IN BLOOD BY AUTOMATED COUNT: 15.3 % (ref 11.5–14.5)
GFR SERPLBLD CREATININE-BSD FMLA CKD-EPI: 37 ML/MIN/1.73/M2
GLUCOSE SERPL-MCNC: 74 MG/DL (ref 70–110)
HCT VFR BLD AUTO: 21.2 % (ref 40–54)
HGB BLD-MCNC: 7.4 GM/DL (ref 14–18)
IMM GRANULOCYTES # BLD AUTO: 0.07 K/UL (ref 0–0.04)
IMM GRANULOCYTES NFR BLD AUTO: 1.2 % (ref 0–0.5)
LYMPHOCYTES # BLD AUTO: 1.08 K/UL (ref 1–4.8)
MAGNESIUM SERPL-MCNC: 1.8 MG/DL (ref 1.6–2.6)
MCH RBC QN AUTO: 28.8 PG (ref 27–31)
MCHC RBC AUTO-ENTMCNC: 34.9 G/DL (ref 32–36)
MCV RBC AUTO: 83 FL (ref 82–98)
NUCLEATED RBC (/100WBC) (OHS): 0 /100 WBC
PHOSPHATE SERPL-MCNC: 4.8 MG/DL (ref 2.7–4.5)
PLATELET # BLD AUTO: 176 K/UL (ref 150–450)
PMV BLD AUTO: 10.1 FL (ref 9.2–12.9)
POTASSIUM SERPL-SCNC: 3.7 MMOL/L (ref 3.5–5.1)
PROT SERPL-MCNC: 4.2 GM/DL (ref 6–8.4)
RBC # BLD AUTO: 2.57 M/UL (ref 4.6–6.2)
RELATIVE EOSINOPHIL (OHS): 0 %
RELATIVE LYMPHOCYTE (OHS): 18.8 % (ref 18–48)
RELATIVE MONOCYTE (OHS): 10.1 % (ref 4–15)
RELATIVE NEUTROPHIL (OHS): 69.7 % (ref 38–73)
SODIUM SERPL-SCNC: 136 MMOL/L (ref 136–145)
WBC # BLD AUTO: 5.74 K/UL (ref 3.9–12.7)

## 2025-06-17 PROCEDURE — 85025 COMPLETE CBC W/AUTO DIFF WBC: CPT | Performed by: STUDENT IN AN ORGANIZED HEALTH CARE EDUCATION/TRAINING PROGRAM

## 2025-06-17 PROCEDURE — 82374 ASSAY BLOOD CARBON DIOXIDE: CPT | Performed by: STUDENT IN AN ORGANIZED HEALTH CARE EDUCATION/TRAINING PROGRAM

## 2025-06-17 PROCEDURE — 99232 SBSQ HOSP IP/OBS MODERATE 35: CPT | Mod: FS,,, | Performed by: INTERNAL MEDICINE

## 2025-06-17 PROCEDURE — 63600175 PHARM REV CODE 636 W HCPCS: Performed by: HOSPITALIST

## 2025-06-17 PROCEDURE — 83735 ASSAY OF MAGNESIUM: CPT | Performed by: STUDENT IN AN ORGANIZED HEALTH CARE EDUCATION/TRAINING PROGRAM

## 2025-06-17 PROCEDURE — 84100 ASSAY OF PHOSPHORUS: CPT | Performed by: STUDENT IN AN ORGANIZED HEALTH CARE EDUCATION/TRAINING PROGRAM

## 2025-06-17 PROCEDURE — 11000001 HC ACUTE MED/SURG PRIVATE ROOM

## 2025-06-17 PROCEDURE — 25000003 PHARM REV CODE 250: Performed by: HOSPITALIST

## 2025-06-17 PROCEDURE — 36415 COLL VENOUS BLD VENIPUNCTURE: CPT | Performed by: STUDENT IN AN ORGANIZED HEALTH CARE EDUCATION/TRAINING PROGRAM

## 2025-06-17 PROCEDURE — 25000003 PHARM REV CODE 250: Performed by: INTERNAL MEDICINE

## 2025-06-17 PROCEDURE — 25000003 PHARM REV CODE 250: Performed by: NURSE PRACTITIONER

## 2025-06-17 RX ADMIN — PREDNISONE 15 MG: 5 TABLET ORAL at 08:06

## 2025-06-17 RX ADMIN — HYDROXYCHLOROQUINE SULFATE 400 MG: 200 TABLET, FILM COATED ORAL at 08:06

## 2025-06-17 RX ADMIN — SODIUM ZIRCONIUM CYCLOSILICATE 10 G: 5 POWDER, FOR SUSPENSION ORAL at 08:06

## 2025-06-17 RX ADMIN — SODIUM BICARBONATE 1300 MG: 650 TABLET ORAL at 04:06

## 2025-06-17 RX ADMIN — SODIUM BICARBONATE 1300 MG: 650 TABLET ORAL at 08:06

## 2025-06-17 RX ADMIN — MYCOPHENOLATE MOFETIL 1500 MG: 500 TABLET, FILM COATED ORAL at 08:06

## 2025-06-17 RX ADMIN — FERROUS SULFATE TAB 325 MG (65 MG ELEMENTAL FE) 1 EACH: 325 (65 FE) TAB at 08:06

## 2025-06-17 RX ADMIN — PANTOPRAZOLE SODIUM 40 MG: 40 TABLET, DELAYED RELEASE ORAL at 08:06

## 2025-06-17 RX ADMIN — CALCIUM CARBONATE (ANTACID) CHEW TAB 500 MG 500 MG: 500 CHEW TAB at 08:06

## 2025-06-17 RX ADMIN — SODIUM BICARBONATE: 84 INJECTION, SOLUTION INTRAVENOUS at 05:06

## 2025-06-17 RX ADMIN — SODIUM ZIRCONIUM CYCLOSILICATE 10 G: 5 POWDER, FOR SUSPENSION ORAL at 04:06

## 2025-06-17 NOTE — PROGRESS NOTES
[facility] Medicine  Progress Note    Patient Name: Maikel Deleon  MRN: [id number]  Patient Class: IP- Inpatient   Admission Date: 6/12/2025  Length of Stay: 4 days  Attending Physician: Christopher Campbell DO  Primary Care Provider: Elaina Primary Doctor        Subjective     Principal Problem:Acute kidney injury superimposed on chronic kidney disease        HPI:  Maikel Deleon is a 25 y.o. male with a PMH  has a past medical history of Marijuana use and Systemic lupus erythematosus, organ or system involvement unspecified. who presented to the ED directed by his rheumatologist after outpatient labs revealed significant anemia and AMBER.  Patient with recently diagnosed with lupus nephritis back in October 2024 in his been noncompliant with home medications.  Patient was last seen by Dr. Giron from Nephrology on 5/12/25 and was reported to be progressing nicely towards remission and stressed importance of adherence and Dr. Correia from Rheumatology yesterday who documented patient was more adherent to his medications however, patient reported to me he has not taken any of his medications since June 5 secondary to potential side affects.  Patient main complaint includes increased tiredness/fatigue as well as lower extremity swelling but reported all other review of systems negative except as noted above including negative epistaxis, hemoptysis, hematemesis, hematuria, melena, or hematochezia.  Initial workup in the ED revealed patient to be afebrile without leukocytosis, hemodynamically stable, H/H 5.2/15.8, potassium 5.7, BUN 60, creatinine/GFR 4.4/18, calcium 7.4, phosphorus 6.1, and chest x-ray negative for acute findings.  Patient admitted to Hospital Medicine inpatient for continued medical management.  Nephrology consulted and awaiting further evaluation/recommendations.    PCP: Elaina, Primary Doctor      Overview/Hospital Course:  6/12-6/13: Admitted to Hospital Medicine  for evaluation of anemia and AMBER on CKD concerns.  Underwent 2u pRbc transfusion with improvement in anemia.  Nephrology consulted for AMBER concerns, evaluation noting possibly related to intravascular volume depletion due to low urine sodium versus anemia.  Started on Lokelma, IV fluids and furosemide trial for hyperkalemia.    6/14:  Mild improvement in renal function.  Hyperkalemia resolved.  6/15:  Electrolytes remained stable, but renal function remains concerning.  Nephrology following, further evaluation with renal ultrasound nonacute.  Lab work pending for further evaluation given concerns for lupus etiology.  Hemoglobin downtrended to 6.9, so 1u pRbc transfused.   6/16:  Renal function with some improvement, but still remains abnormal compared to baseline.  Hypocomplementemia, recurrent anemia on labs concerning for lupus flare.  Discussed with Rheumatology provider at [facility], plans for transfer to [facility] for evaluation by Rheumatology.     Interval History: No acute events overnight, afebrile, hemodynamically stable. Renal function with some improvement, but still remains abnormal compared to baseline.  Hypocomplementemia, recurrent anemia on labs concerning for lupus flare.  Discussed with Rheumatology provider at [facility], plans for transfer to [facility] for evaluation by Rheumatology.       Objective:     Vital Signs (Most Recent):  Temp: 98.2 °F (36.8 °C) (06/16/25 1957)  Pulse: 69 (06/16/25 2111)  Resp: 18 (06/16/25 1957)  BP: 118/66 (06/16/25 1957)  SpO2: 99 % (06/16/25 1957) Vital Signs (24h Range):  Temp:  [97.6 °F (36.4 °C)-98.2 °F (36.8 °C)] 98.2 °F (36.8 °C)  Pulse:  [59-77] 69  Resp:  [16-19] 18  SpO2:  [99 %-100 %] 99 %  BP: (118-144)/(66-84) 118/66     Weight: 79.3 kg (174 lb 13.2 oz)  Body mass index is 25.82 kg/m².    Intake/Output Summary (Last 24 hours) at 6/16/2025 2312  Last data filed at 6/16/2025 1820  Gross per 24 hour   Intake 720 ml   Output 500 ml   Net 220  ml         Physical Exam  Vitals and nursing note reviewed.   Constitutional:       General: He is not in acute distress.     Appearance: He is not ill-appearing, toxic-appearing or diaphoretic.   HENT:      Head: Normocephalic and atraumatic.      Mouth/Throat:      Mouth: Mucous membranes are moist.   Eyes:      General: No scleral icterus.        Right eye: No discharge.         Left eye: No discharge.   Cardiovascular:      Rate and Rhythm: Normal rate and regular rhythm.      Heart sounds: Normal heart sounds.   Pulmonary:      Effort: Pulmonary effort is normal. No respiratory distress.      Breath sounds: No wheezing, rhonchi or rales.   Abdominal:      General: Bowel sounds are normal. There is no distension.      Tenderness: There is no abdominal tenderness. There is no guarding or rebound.   Musculoskeletal:         General: Swelling present.      Cervical back: No rigidity.      Right lower leg: Edema present.      Left lower leg: Edema present.   Skin:     General: Skin is warm and dry.      Coloration: Skin is not jaundiced.   Neurological:      Mental Status: He is alert and oriented to person, place, and time. Mental status is at baseline.   Psychiatric:         Mood and Affect: Mood normal.         Behavior: Behavior normal.             Significant Labs: All pertinent labs within the past 24 hours have been reviewed.   Recent Labs   Lab 06/15/25  0449 06/16/25  0451 06/16/25  0703   * 134* 133*   K 4.9 4.5 3.8    107 106   CO2 16* 18* 18*   BUN 71* 75* 74*   CREATININE 4.1* 3.1* 3.1*    77 76   ANIONGAP 7* 9 9     Recent Labs   Lab 06/15/25  0449 06/15/25  0957 06/16/25  0451 06/16/25  0703   MG 1.5* 1.5* 1.9  --    PHOS 6.5*  --  5.6* 5.4*     Recent Labs   Lab 06/14/25  0455 06/15/25  0449 06/16/25  0451 06/16/25  0703   AST 15 13 15  --    ALT 8* 5* 5*  --    ALKPHOS 67 62 70  --    BILITOT 1.3* 0.7 0.8  --    ALBUMIN 0.9* 0.9* 1.1* 1.0*     POCT Glucose:   Recent Labs   Lab  06/14/25  0258 06/14/25  0358 06/14/25  1124   POCTGLUCOSE 84 106 100    Recent Labs   Lab 06/13/25  1159 06/14/25  0454 06/15/25  0449 06/15/25  2013 06/16/25  0451   WBC 6.14   < > 6.40 7.24 7.39   HGB 8.5*   < > 6.9* 8.3* 8.6*   HCT 25.1*   < > 20.4* 23.2* 24.7*      < > 144* 171 196   GRAN 4.1  --   --   --   --     < > = values in this interval not displayed.                       Significant Imaging:  I have reviewed all pertinent imaging results/findings within the past 24 hours.   US Retroperitoneal Complete   Final Result      1.  Normal bilateral renal ultrasound.  Negative for hydronephrosis.  Echogenicity of the kidneys is normal.   2.  Small right pleural effusion.         Finalized on: 6/15/2025 1:03 PM By:  Daryl Rivera MD   [facility]# [id number]      2025-06-15 13:05:15.238     [facility]      X-Ray Chest AP Portable   Final Result    No acute findings.      Finalized on: 6/12/2025 9:10 PM By:  Santo Sanchez MD   [facility]# [id number]      2025-06-12 21:12:11.879     [facility]          Inpatient Medications:  Continuous Infusions:   sodium bicarbonate 75 mEq in 0.45% NaCl 1,075 mL infusion   Intravenous Continuous 150 mL/hr at 06/16/25 0819 New Bag at 06/16/25 0819       Scheduled Meds:   calcium carbonate  500 mg Oral Daily    ferrous sulfate  1 tablet Oral Daily    hydroxychloroquine  400 mg Oral Daily    mycophenolate  1,500 mg Oral BID    pantoprazole  40 mg Oral Daily    predniSONE  15 mg Oral Daily    sodium bicarbonate  1,300 mg Oral TID    sodium zirconium cyclosilicate  10 g Oral TID    voclosporin  23.7 mg Oral Daily       PRN Meds:  Current Facility-Administered Medications:     0.9%  NaCl infusion (for blood administration), , Intravenous, Q24H PRN    0.9%  NaCl infusion (for blood administration), , Intravenous, Q24H PRN    acetaminophen, 650 mg, Rectal, Q6H PRN    acetaminophen, 650 mg, Oral, Q8H PRN    albuterol-ipratropium, 3 mL, Nebulization, Q6H PRN    [COMPLETED] calcium gluconate IVPB, 1 g,  Intravenous, Once **AND** calcium gluconate IVPB, 1 g, Intravenous, Q10 Min PRN    dextrose 50%, 12.5 g, Intravenous, PRN    dextrose 50%, 25 g, Intravenous, PRN    glucagon (human recombinant), 1 mg, Intramuscular, PRN    glucose, 16 g, Oral, PRN    glucose, 24 g, Oral, PRN    HYDROcodone-acetaminophen, 1 tablet, Oral, Q6H PRN    melatonin, 6 mg, Oral, Nightly PRN    morphine, 2 mg, Intravenous, Q4H PRN    naloxone, 0.02 mg, Intravenous, PRN    ondansetron, 4 mg, Intravenous, Q8H PRN    promethazine, 25 mg, Oral, Q6H PRN    senna-docusate, 1 tablet, Oral, BID PRN    sodium chloride 0.9%, 10 mL, Intravenous, Q12H PRN            Assessment & Plan  Acute kidney injury superimposed on chronic kidney disease  Baseline creatinine is 1.38. Most recent creatinine and eGFR are listed below.  Recent Labs     06/15/25  0449 06/16/25  0451 06/16/25  0703   CREATININE 4.1* 3.1* 3.1*   EGFRNORACEVR 19* 27* 27*     Plan  -AMBER is currently undergoing medical management  -Avoid nephrotoxins and renally dose meds for GFR listed above  -Monitor urine output, serial BMP, and adjust therapy as needed  -f/u nephrology  recs    Hyperkalemia  Hyperkalemia is likely due to AMBER.The patients most recent potassium results are listed below.  Recent Labs     06/15/25  0449 06/16/25  0451 06/16/25  0703   K 4.9 4.5 3.8     Plan  -Monitor for arrhythmias with EKG and/or continuous telemetry.   -Treat the hyperkalemia with fluids and hyperkalemia protocol as needed  -Monitor potassium: Daily  -The patient's hyperkalemia is resolved    Lupus nephritis  Patient recently diagnosed with lupus nephritis back in October 2024.  Has been followed both by Nephrology and Rheumatology outpatient last seen by Dr. Giron from Nephrology on 5/12/25 and Dr. Correia from Rheumatology yesterday with Assessment/Plan noted below.      Dr. 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 He  "was on MMF, plaquenil and prednisone and recently, started on voclosporin by rheum, but he had stopped all medications for about 6 weeks or so. He is back on everything now for about a week. He feels ok- no evidence of systemic lupus symptoms but UA still with proteinuria and some microscopic hematuria. Also his creatinine is a bit up since last time but still 1.38 mg/dL  Plan:  - DsDNA now negative. Complements normal.   - uric acid is elevated to 8.6  - cont cellcept 1500 mg PO BID, plaquenil 200 mg daily and prednisone 15 mg daily for now  - started on voclosporin with rheumatology in march but has only been on it for about a week  - will see rheum next month- if proteinuria/renal function stable, can consider tapering prednisone to 10 mg daily  - has PCP locally- should follow up in [place] with them too  - will start lisinopril 5 mg daily to help with proteinuria.   - he appears to be progressinly nicely towards remission but I discussed with him the importance of staying on his regimen to completion in order to get him into remission, otherwise likelihood of flare up is high  - no edema noted- stopped furosemide    Dr. Correia from Rheumatology 6/12/2025    "Lupus Nephritis Class III/V  Timeline as above in regards to patient's lupus nephritis diagnosis.  Unfortunately, patient had been off therapy for 6 weeks due to nausea/vomiting that he initially thought secondary to his medications. Notes that he will intermittently have nausea even off the medications and was encouraged to restart therapy with Neurology. Protein/Cr ratio significant with 10g of proteinuria and lower extremity edema. I have counseled and admonished the patient at stopping his medications and informed him to please continue them regardless and inform me if there is any issues.   Plan:  -Check labs to r/o medication side effects CBC, CMP, CRP and Sed Rate.  -JOSEPH, C3/C4 and UA UPCr.  -C/w PDN 15 mg every day, HCQ 400mg every day, MMF " "1500mg BID and full dose Voclosporin.  -Discussed adherence with meds and will rx Zofran   -PPI, Ca/ Vit D   -If UPCr still in nephrotic range will start Eliquis  -start Zofran as needed and PPI daily  -referral to GI for nausea and vomiting as this was present before starting therapy and could be secondary to obstruction"      Anemia due to chronic kidney disease  Anemia is likely due to chronic disease due to lupus nephritis. Most recent hemoglobin and hematocrit are listed below.  Recent Labs     06/15/25  0449 06/15/25  2013 06/16/25  0451   HGB 6.9* 8.3* 8.6*   HCT 20.4* 23.2* 24.7*     Plan  -Monitor serial CBC: Every 12 hours  -Transfuse PRBC if patient becomes hemodynamically unstable, symptomatic or H/H drops below 7/21.  Si  -Patient has received 2 units of PRBCs on 06/13/25  -Patient's anemia is currently undergoing medical management    Hypertension  Chronic, controlled.  Latest blood pressure and vitals reviewed-   Temp:  [97.6 °F (36.4 °C)-98.2 °F (36.8 °C)]   Pulse:  [59-77]   Resp:  [16-19]   BP: (118-144)/(66-84)   SpO2:  [99 %-100 %] .   Home meds for hypertension were reviewed and noted below.   Hypertension Medications              furosemide (LASIX) 40 MG tablet Take 1 tablet (40 mg total) by mouth once daily.    lisinopriL (PRINIVIL,ZESTRIL) 5 MG tablet Take 5 mg by mouth.     While in the hospital, will manage blood pressure as follows; Continue home antihypertensive regimen    Will utilize p.r.n. blood pressure medication only if patient's blood pressure greater than  180/110 and he develops symptoms such as worsening chest pain or shortness of breath.      VTE Risk Mitigation (From admission, onward)           Ordered     IP VTE HIGH RISK PATIENT  Once         06/13/25 0018     Place sequential compression device  Until discontinued         06/13/25 0018     Reason for No Pharmacological VTE Prophylaxis  Once        Question:  Reasons:  Answer:  Physician Provided (leave comment)  Comment:  " pending blood transfusion    06/13/25 0018                    Discharge Planning   LUIGI: 6/16/2025     Code Status: Full Code   Medical Readiness for Discharge Date: 6/16/2025  Discharge Plan A: Home              Christopher Campbell DO  Department of Hospital Medicine   [place] - Telemetry ([facility])    Voice recognition software was used in the creation of this note/communication and any sound-alike/typographical errors which may have occurred despite initial review prior to signing should be taken in context when interpreting.  If such errors prevent a clear understanding of the note/communication, please contact the provider/office for clarification.

## 2025-06-17 NOTE — ASSESSMENT & PLAN NOTE
"Patient recently diagnosed with lupus nephritis back in October 2024.  Has been followed both by Nephrology and Rheumatology outpatient last seen by Dr. Giron from Nephrology on 5/12/25 and Dr. Correia from Rheumatology yesterday with Assessment/Plan noted below.      Dr. 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 He was on MMF, plaquenil and prednisone and recently, started on voclosporin by rheum, but he had stopped all medications for about 6 weeks or so. He is back on everything now for about a week. He feels ok- no evidence of systemic lupus symptoms but UA still with proteinuria and some microscopic hematuria. Also his creatinine is a bit up since last time but still 1.38 mg/dL  Plan:  - DsDNA now negative. Complements normal.   - uric acid is elevated to 8.6  - cont cellcept 1500 mg PO BID, plaquenil 200 mg daily and prednisone 15 mg daily for now  - started on voclosporin with rheumatology in march but has only been on it for about a week  - will see rheum next month- if proteinuria/renal function stable, can consider tapering prednisone to 10 mg daily  - has PCP locally- should follow up in [place] with them too  - will start lisinopril 5 mg daily to help with proteinuria.   - he appears to be progressinly nicely towards remission but I discussed with him the importance of staying on his regimen to completion in order to get him into remission, otherwise likelihood of flare up is high  - no edema noted- stopped furosemide    Dr. Correia from Rheumatology 6/12/2025    "Lupus Nephritis Class III/V  Timeline as above in regards to patient's lupus nephritis diagnosis.  Unfortunately, patient had been off therapy for 6 weeks due to nausea/vomiting that he initially thought secondary to his medications. Notes that he will intermittently have nausea even off the medications and was encouraged to restart therapy with Neurology. " "Protein/Cr ratio significant with 10g of proteinuria and lower extremity edema. I have counseled and admonished the patient at stopping his medications and informed him to please continue them regardless and inform me if there is any issues.   Plan:  -Check labs to r/o medication side effects CBC, CMP, CRP and Sed Rate.  -JOSEPH, C3/C4 and UA UPCr.  -C/w PDN 15 mg every day, HCQ 400mg every day, MMF 1500mg BID and full dose Voclosporin.  -Discussed adherence with meds and will rx Zofran   -PPI, Ca/ Vit D   -If UPCr still in nephrotic range will start Eliquis  -start Zofran as needed and PPI daily  -referral to GI for nausea and vomiting as this was present before starting therapy and could be secondary to obstruction"      "

## 2025-06-17 NOTE — PLAN OF CARE
POC reviewed w/ patient. Pt verbalizes understanding of plan  Chart/Orders reviewed  All safety precautions in place; No falls this shift  Pt resting in bed  Pain controlled  Continuous rounding c bed in lowest position, side rails up, call light in reach  Will continue to monitor until the end of shift  Problem: Adult Inpatient Plan of Care  Goal: Plan of Care Review  Flowsheets (Taken 6/17/2025 0419)  Plan of Care Reviewed With: patient  Goal: Patient-Specific Goal (Individualized)  Flowsheets (Taken 6/17/2025 0419)  Individualized Care Needs: none  Anxieties, Fears or Concerns: none  Patient/Family-Specific Goals (Include Timeframe): none  Goal: Absence of Hospital-Acquired Illness or Injury  Outcome: Progressing  Goal: Optimal Comfort and Wellbeing  Outcome: Progressing  Goal: Readiness for Transition of Care  Outcome: Progressing

## 2025-06-17 NOTE — SUBJECTIVE & OBJECTIVE
Interval History: No acute events overnight, afebrile, hemodynamically stable. Renal function with some improvement, but still remains abnormal compared to baseline.  Hypocomplementemia, recurrent anemia on labs concerning for lupus flare.  Discussed with Rheumatology provider at [facility], plans for transfer to [facility] for evaluation by Rheumatology.       Objective:     Vital Signs (Most Recent):  Temp: 98.2 °F (36.8 °C) (06/16/25 1957)  Pulse: 69 (06/16/25 2111)  Resp: 18 (06/16/25 1957)  BP: 118/66 (06/16/25 1957)  SpO2: 99 % (06/16/25 1957) Vital Signs (24h Range):  Temp:  [97.6 °F (36.4 °C)-98.2 °F (36.8 °C)] 98.2 °F (36.8 °C)  Pulse:  [59-77] 69  Resp:  [16-19] 18  SpO2:  [99 %-100 %] 99 %  BP: (118-144)/(66-84) 118/66     Weight: 79.3 kg (174 lb 13.2 oz)  Body mass index is 25.82 kg/m².    Intake/Output Summary (Last 24 hours) at 6/16/2025 2312  Last data filed at 6/16/2025 1820  Gross per 24 hour   Intake 720 ml   Output 500 ml   Net 220 ml         Physical Exam  Vitals and nursing note reviewed.   Constitutional:       General: He is not in acute distress.     Appearance: He is not ill-appearing, toxic-appearing or diaphoretic.   HENT:      Head: Normocephalic and atraumatic.      Mouth/Throat:      Mouth: Mucous membranes are moist.   Eyes:      General: No scleral icterus.        Right eye: No discharge.         Left eye: No discharge.   Cardiovascular:      Rate and Rhythm: Normal rate and regular rhythm.      Heart sounds: Normal heart sounds.   Pulmonary:      Effort: Pulmonary effort is normal. No respiratory distress.      Breath sounds: No wheezing, rhonchi or rales.   Abdominal:      General: Bowel sounds are normal. There is no distension.      Tenderness: There is no abdominal tenderness. There is no guarding or rebound.   Musculoskeletal:         General: Swelling present.      Cervical back: No rigidity.      Right lower leg: Edema present.      Left lower leg: Edema present.    Skin:     General: Skin is warm and dry.      Coloration: Skin is not jaundiced.   Neurological:      Mental Status: He is alert and oriented to person, place, and time. Mental status is at baseline.   Psychiatric:         Mood and Affect: Mood normal.         Behavior: Behavior normal.             Significant Labs: All pertinent labs within the past 24 hours have been reviewed.   Recent Labs   Lab 06/15/25  0449 06/16/25  0451 06/16/25  0703   * 134* 133*   K 4.9 4.5 3.8    107 106   CO2 16* 18* 18*   BUN 71* 75* 74*   CREATININE 4.1* 3.1* 3.1*    77 76   ANIONGAP 7* 9 9     Recent Labs   Lab 06/15/25  0449 06/15/25  0957 06/16/25  0451 06/16/25  0703   MG 1.5* 1.5* 1.9  --    PHOS 6.5*  --  5.6* 5.4*     Recent Labs   Lab 06/14/25  0455 06/15/25  0449 06/16/25  0451 06/16/25  0703   AST 15 13 15  --    ALT 8* 5* 5*  --    ALKPHOS 67 62 70  --    BILITOT 1.3* 0.7 0.8  --    ALBUMIN 0.9* 0.9* 1.1* 1.0*     POCT Glucose:   Recent Labs   Lab 06/14/25  0258 06/14/25  0358 06/14/25  1124   POCTGLUCOSE 84 106 100    Recent Labs   Lab 06/13/25  1159 06/14/25  0454 06/15/25  0449 06/15/25  2013 06/16/25  0451   WBC 6.14   < > 6.40 7.24 7.39   HGB 8.5*   < > 6.9* 8.3* 8.6*   HCT 25.1*   < > 20.4* 23.2* 24.7*      < > 144* 171 196   GRAN 4.1  --   --   --   --     < > = values in this interval not displayed.                       Significant Imaging:  I have reviewed all pertinent imaging results/findings within the past 24 hours.   US Retroperitoneal Complete   Final Result      1.  Normal bilateral renal ultrasound.  Negative for hydronephrosis.  Echogenicity of the kidneys is normal.   2.  Small right pleural effusion.         Finalized on: 6/15/2025 1:03 PM By:  Daryl Rivera MD   [facility]# [id number]      2025-06-15 13:05:15.238     [facility]      X-Ray Chest AP Portable   Final Result    No acute findings.      Finalized on: 6/12/2025 9:10 PM By:  Santo Sanchez MD   [facility]# [id number]      2025-06-12  21:12:11.879     [facility]          Inpatient Medications:  Continuous Infusions:   sodium bicarbonate 75 mEq in 0.45% NaCl 1,075 mL infusion   Intravenous Continuous 150 mL/hr at 06/16/25 0819 New Bag at 06/16/25 0819       Scheduled Meds:   calcium carbonate  500 mg Oral Daily    ferrous sulfate  1 tablet Oral Daily    hydroxychloroquine  400 mg Oral Daily    mycophenolate  1,500 mg Oral BID    pantoprazole  40 mg Oral Daily    predniSONE  15 mg Oral Daily    sodium bicarbonate  1,300 mg Oral TID    sodium zirconium cyclosilicate  10 g Oral TID    voclosporin  23.7 mg Oral Daily       PRN Meds:  Current Facility-Administered Medications:     0.9%  NaCl infusion (for blood administration), , Intravenous, Q24H PRN    0.9%  NaCl infusion (for blood administration), , Intravenous, Q24H PRN    acetaminophen, 650 mg, Rectal, Q6H PRN    acetaminophen, 650 mg, Oral, Q8H PRN    albuterol-ipratropium, 3 mL, Nebulization, Q6H PRN    [COMPLETED] calcium gluconate IVPB, 1 g, Intravenous, Once **AND** calcium gluconate IVPB, 1 g, Intravenous, Q10 Min PRN    dextrose 50%, 12.5 g, Intravenous, PRN    dextrose 50%, 25 g, Intravenous, PRN    glucagon (human recombinant), 1 mg, Intramuscular, PRN    glucose, 16 g, Oral, PRN    glucose, 24 g, Oral, PRN    HYDROcodone-acetaminophen, 1 tablet, Oral, Q6H PRN    melatonin, 6 mg, Oral, Nightly PRN    morphine, 2 mg, Intravenous, Q4H PRN    naloxone, 0.02 mg, Intravenous, PRN    ondansetron, 4 mg, Intravenous, Q8H PRN    promethazine, 25 mg, Oral, Q6H PRN    senna-docusate, 1 tablet, Oral, BID PRN    sodium chloride 0.9%, 10 mL, Intravenous, Q12H PRN

## 2025-06-17 NOTE — ASSESSMENT & PLAN NOTE
Hyperkalemia is likely due to AMBER.The patients most recent potassium results are listed below.  Recent Labs     06/15/25  0449 06/16/25  0451 06/16/25  0703   K 4.9 4.5 3.8     Plan  -Monitor for arrhythmias with EKG and/or continuous telemetry.   -Treat the hyperkalemia with fluids and hyperkalemia protocol as needed  -Monitor potassium: Daily  -The patient's hyperkalemia is resolved

## 2025-06-17 NOTE — PLAN OF CARE
A218/A218 CRUZITO  Maikel Deleon is a 25 y.o.male admitted on 6/12/2025 for Acute kidney injury superimposed on chronic kidney disease   Code Status: Full Code MRN: [id number]   Review of patient's allergies indicates:   Allergen Reactions    Sulfa (sulfonamide antibiotics)     Sulfamethoxazole-trimethoprim Other (See Comments)     Past Medical History:   Diagnosis Date    Marijuana use     Systemic lupus erythematosus, organ or system involvement unspecified       PRN meds    0.9%  NaCl infusion (for blood administration), , Q24H PRN  0.9%  NaCl infusion (for blood administration), , Q24H PRN  acetaminophen, 650 mg, Q6H PRN  acetaminophen, 650 mg, Q8H PRN  albuterol-ipratropium, 3 mL, Q6H PRN  calcium gluconate IVPB, 1 g, Q10 Min PRN  dextrose 50%, 12.5 g, PRN  dextrose 50%, 25 g, PRN  glucagon (human recombinant), 1 mg, PRN  glucose, 16 g, PRN  glucose, 24 g, PRN  HYDROcodone-acetaminophen, 1 tablet, Q6H PRN  melatonin, 6 mg, Nightly PRN  morphine, 2 mg, Q4H PRN  naloxone, 0.02 mg, PRN  ondansetron, 4 mg, Q8H PRN  promethazine, 25 mg, Q6H PRN  senna-docusate, 1 tablet, BID PRN  sodium chloride 0.9%, 10 mL, Q12H PRN      Chart check completed. Will continue plan of care. Bed not available at main        [place] Coma Scale Score: 15     Lead Monitored: Lead II Rhythm: normal sinus rhythm    Cardiac/Telemetry Box Number: [id number]  VTE Core Measure: (SCDs) Sequential compression device initiated/maintained Last Bowel Movement: 06/15/25  Diet Low Sodium (2 gm) Renal Non-Dialysis     Clinton Score: 23  Fall Risk Score: 7  Accucheck []   Freq?      Lines/Drains/Airways       Peripheral Intravenous Line  Duration                  Peripheral IV - Single Lumen 06/15/25 1424 20 G Posterior;Proximal;Right Forearm 2 days

## 2025-06-17 NOTE — PROGRESS NOTES
Nephrology progress note        History of Present Illness     The patient is a 25 y.o. male with a hx of biopsy-proven lupus nephritis diagnosed October 2024 followed with [facility] nephrology Dr Giron in [place].  Patient has persistent proteinuria with an initial PCR of 5.9 g.  Treated with MMF, Plaquenil, prednisone. Voclosporin added per rheumatology.  Last seen per [facility] 05/12/2025 with a creatinine of 1.3 and a hemoglobin of 7.9.  At that visit he appeared to be close to remission per Nephrology notes.    Patient admitted to [facility] 6/12 with outpatient labs drawn per rheumatology which revealed significant anemia and worsening renal function.  Patient reportedly had stopped taking his medications since 06/05 due to fear of side effects.  Admit labs revealed a hemoglobin of 5.2, potassium 5.7, CO2 14, BUN/creatinine 60/4.2.  Denies NSAIDs. CXR negative.  Patient admitted per hospital medicine.  Renal consulted for AMBER on CKD.    Interval history:  No acute events overnight.  Creatinine continues to improve with good urine output and no overt signs of uremia.  Patient likely to be discharged today to [place] to follow with his usual providers.        PMHx:    Past Medical History:   Diagnosis Date    Marijuana use     Systemic lupus erythematosus, organ or system involvement unspecified        SocHx:    Social History[1]    FamHx:    Family History   Problem Relation Name Age of Onset    No Known Problems Mother      Hypertension Father      Sickle cell trait Sister      Heart failure Brother      Diabetes Brother         ROS:    CONSTITUTIONAL: negative for - chills, fever, or night sweats  CARDIOVASCULAR: negative for - chest pain, palpitations, tachycardia, or dyspnea on exertion  RESPIRATORY: negative for - cough, hemoptysis, shortness of breath, tachypnea, or wheezing  GASTROINTESTINAL: negative for - nausea, vomiting, constipation, diarrhea, abdominal pain, or change in bowel  "habits  GENITOURINARY:  negative for - incontinence, nocturia, dysuria, or sexual dysfunction   MUSCULOSKELETAL: negative for - joint stiffness, joint swelling, joint pain, or muscle pain   SKIN:  negative for jaundice, pruritus, rash, or lacerations  NEUROLOGIC: +weakness  ENDOCRINE: negative for - polydipsia, polyuria, galactorrhea, gynecomastia, hot flashes, or temperature intolerance  HEME/LYMPH: negative for - bleeding, hemorrhage, bruising, jaundice, or pallor     Health Status   Allergies:    is allergic to sulfa (sulfonamide antibiotics) and sulfamethoxazole-trimethoprim.    Current medications:   Current Medications[2]     Physical Examination   VS/Measurements   BP (!) 145/86 (Patient Position: Lying)   Pulse 69   Temp 97.6 °F (36.4 °C) (Oral)   Resp 18   Ht 5' 9" (1.753 m)   Wt 79.3 kg (174 lb 13.2 oz)   SpO2 98%   BMI 25.82 kg/m²     Physical Exam  Cardiovascular:      Rate and Rhythm: Normal rate and regular rhythm.      Pulses: Normal pulses.      Heart sounds: Normal heart sounds.   Pulmonary:      Effort: Pulmonary effort is normal.      Breath sounds: Normal breath sounds.   Abdominal:      General: Abdomen is flat. Bowel sounds are normal.      Palpations: Abdomen is soft.   Musculoskeletal:      Cervical back: Neck supple.      Right lower leg: Edema present.      Left lower leg: Edema present.   Skin:     General: Skin is warm and dry.   Neurological:      Mental Status: He is alert and oriented to person, place, and time.   Psychiatric:         Mood and Affect: Mood normal.         Behavior: Behavior normal.            Review / Management   Laboratory Results   Today's Lab Results :    Recent Results (from the past 24 hours)   Phosphorus    Collection Time: 06/17/25  4:53 AM   Result Value Ref Range    Phosphorus Level 4.8 (H) 2.7 - 4.5 mg/dL   Magnesium    Collection Time: 06/17/25  4:53 AM   Result Value Ref Range    Magnesium  1.8 1.6 - 2.6 mg/dL   Comprehensive Metabolic Panel    " Collection Time: 06/17/25  4:53 AM   Result Value Ref Range    Sodium 136 136 - 145 mmol/L    Potassium 3.7 3.5 - 5.1 mmol/L    Chloride 107 95 - 110 mmol/L    CO2 22 (L) 23 - 29 mmol/L    Glucose 74 70 - 110 mg/dL    BUN 73 (H) 6 - 20 mg/dL    Creatinine 2.4 (H) 0.5 - 1.4 mg/dL    Calcium 6.6 (LL) 8.7 - 10.5 mg/dL    Protein Total 4.2 (L) 6.0 - 8.4 gm/dL    Albumin 1.0 (L) 3.5 - 5.2 g/dL    Bilirubin Total 1.0 0.1 - 1.0 mg/dL    ALP 66 40 - 150 unit/L    AST 12 11 - 45 unit/L    ALT 6 (L) 10 - 44 unit/L    Anion Gap 7 (L) 8 - 16 mmol/L    eGFR 37 (L) >60 mL/min/1.73/m2   CBC with Differential    Collection Time: 06/17/25  4:53 AM   Result Value Ref Range    WBC 5.74 3.90 - 12.70 K/uL    RBC 2.57 (L) 4.60 - 6.20 M/uL    HGB 7.4 (L) 14.0 - 18.0 gm/dL    HCT 21.2 (L) 40.0 - 54.0 %    MCV 83 82 - 98 fL    MCH 28.8 27.0 - 31.0 pg    MCHC 34.9 32.0 - 36.0 g/dL    RDW 15.3 (H) 11.5 - 14.5 %    Platelet Count 176 150 - 450 K/uL    MPV 10.1 9.2 - 12.9 fL    Nucleated RBC 0 <=0 /100 WBC    Neut % 69.7 38 - 73 %    Lymph % 18.8 18 - 48 %    Mono % 10.1 4 - 15 %    Eos % 0.0 <=8 %    Basophil % 0.2 <=1.9 %    Imm Grans % 1.2 (H) 0.0 - 0.5 %    Neut # 4.00 1.8 - 7.7 K/uL    Lymph # 1.08 1 - 4.8 K/uL    Mono # 0.58 0.3 - 1 K/uL    Eos # 0.00 <=0.5 K/uL    Baso # 0.01 <=0.2 K/uL    Imm Grans # 0.07 (H) 0.00 - 0.04 K/uL        Impression and Plan   Diagnosis     AMBER on CKD.  Baseline creatinine 1.3-1.4.  Likely IVVD in the setting of severe anemia.  Creatinine continues to improve with good urine output and no overt signs of uremia. Patient back on lupus meds.  UA with 3+ protein and 2+ blood.  Fractional excretion of sodium 0.2% indicating prerenal.  CPK ok. BNP 11.  Making urine. Dry based on FENA.  Continue IV fluids with bicarb.  Renal ultrasound negative.    Hypocalcemia.  Albumin 0.9.  Corrected calcium 9.0.    Hypomagnesemia.  On Mag oxide    Anemia.  Hemoglobin 6.9.  Defer to  for PRBCs.      Hyperkalemia.  Persistent  but improving.  Continue Lokelma.    Metabolic acidosis.  P.o. bicarb supplements, bicarb drip.    Lupus nephritis.  On MMF, Plaquenil, prednisone, Voclosporin    --complements low, normal double-stranded DNA.  Patient to transfer to [place] to be seen by his main rheumatologist    Hypertension.  Good control off meds.    Thrombocytopenia.  Mild.  Monitor.       .                    [1]   Social History  Socioeconomic History    Marital status: Single   Tobacco Use    Smoking status: Former     Types: Cigarettes    Smokeless tobacco: Former   Vaping Use    Vaping status: Never Used   Substance and Sexual Activity    Alcohol use: Not Currently    Drug use: Yes     Types: Marijuana     Social Drivers of Health     Financial Resource Strain: Low Risk  (3/20/2025)    Received from [facility]    Overall Financial Resource Strain (CARDIA)     Difficulty of Paying Living Expenses: Not hard at all   Food Insecurity: No Food Insecurity (3/20/2025)    Received from [facility]    Hunger Vital Sign     Worried About Running Out of Food in the Last Year: Never true     Ran Out of Food in the Last Year: Never true   Transportation Needs: No Transportation Needs (3/20/2025)    Received from [facility]    PRAPARE - Transportation     Lack of Transportation (Medical): No     Lack of Transportation (Non-Medical): No   Physical Activity: Inactive (3/20/2025)    Received from [facility]    Exercise Vital Sign     Days of Exercise per Week: 0 days     Minutes of Exercise per Session: 30 min   Stress: No Stress Concern Present (3/20/2025)    Received from [facility]    [employer] - Occupational Stress Questionnaire     Feeling of Stress : Not at all   Housing Stability: Unknown (3/20/2025)    Received from [facility]    Housing Stability Vital Sign     Unable to Pay for Housing in the Last Year: No   [2]   Current Facility-Administered Medications:     0.9%  NaCl infusion (for blood  administration), , Intravenous, Q24H PRN, René Lewis Jr., MD, New Bag at 06/14/25 2148    0.9%  NaCl infusion (for blood administration), , Intravenous, Q24H PRN, Christopher Campbell,     acetaminophen suppository 650 mg, 650 mg, Rectal, Q6H PRN, Joni Vo MD    acetaminophen tablet 650 mg, 650 mg, Oral, Q8H PRN, Joni Vo MD    albuterol-ipratropium 2.5 mg-0.5 mg/3 mL nebulizer solution 3 mL, 3 mL, Nebulization, Q6H PRN, Joni Vo MD    calcium carbonate 200 mg calcium (500 mg) chewable tablet 500 mg, 500 mg, Oral, Daily, Joni Vo MD, 500 mg at 06/17/25 0839    [COMPLETED] calcium gluconate 1 g in D5W 50 mL IVPB (MB+), 1 g, Intravenous, Once, Stopped at 06/13/25 2314 **AND** calcium gluconate 1 g in D5W 50 mL IVPB (MB+), 1 g, Intravenous, Q10 Min PRN, Christopher Campbell DO    dextrose 50% injection 12.5 g, 12.5 g, Intravenous, PRN, Joni Vo MD    dextrose 50% injection 25 g, 25 g, Intravenous, PRN, Joni Vo MD    ferrous sulfate tablet 1 each, 1 tablet, Oral, Daily, Joni Vo MD, 1 each at 06/17/25 0839    glucagon (human recombinant) injection 1 mg, 1 mg, Intramuscular, PRN, Joni Vo MD    glucose chewable tablet 16 g, 16 g, Oral, PRN, Joni Vo MD    glucose chewable tablet 24 g, 24 g, Oral, PRN, Joni Vo MD    HYDROcodone-acetaminophen 5-325 mg per tablet 1 tablet, 1 tablet, Oral, Q6H PRN, Joni Vo MD    hydroxychloroquine tablet 400 mg, 400 mg, Oral, Daily, Joni Vo MD, 400 mg at 06/17/25 0839    melatonin tablet 6 mg, 6 mg, Oral, Nightly PRN, Joni Vo MD    morphine injection 2 mg, 2 mg, Intravenous, Q4H PRN, Joni Vo MD    mycophenolate tablet 1,500 mg, 1,500 mg, Oral, BID, Joni Vo MD, 1,500 mg at 06/17/25 0839    naloxone 0.4 mg/mL injection 0.02 mg, 0.02 mg, Intravenous, PRN, Joni Vo MD    ondansetron injection 4  mg, 4 mg, Intravenous, Q8H PRN, Joni Vo MD    pantoprazole EC tablet 40 mg, 40 mg, Oral, Daily, Joni Vo MD, 40 mg at 06/17/25 0839    predniSONE tablet 15 mg, 15 mg, Oral, Daily, Joni Vo MD, 15 mg at 06/17/25 0839    promethazine tablet 25 mg, 25 mg, Oral, Q6H PRN, Joni Vo MD    senna-docusate 8.6-50 mg per tablet 1 tablet, 1 tablet, Oral, BID PRN, Joni Vo MD    sodium bicarbonate 75 mEq in 0.45% NaCl 1,075 mL infusion, , Intravenous, Continuous, Davey Flores MD, Last Rate: 150 mL/hr at 06/17/25 0559, New Bag at 06/17/25 0559    sodium bicarbonate tablet 1,300 mg, 1,300 mg, Oral, TID, Joni Vo MD, 1,300 mg at 06/17/25 0839    sodium chloride 0.9% flush 10 mL, 10 mL, Intravenous, Q12H PRN, Joni Vo MD    sodium zirconium cyclosilicate packet 10 g, 10 g, Oral, TID, Giovanni Mcguire NP, 10 g at 06/17/25 0838    voclosporin Cap 23.7 mg, 23.7 mg, Oral, Daily, Joni Vo MD, 23.7 mg at 06/17/25 0839

## 2025-06-17 NOTE — ASSESSMENT & PLAN NOTE
Chronic, controlled.  Latest blood pressure and vitals reviewed-   Temp:  [97.6 °F (36.4 °C)-98.2 °F (36.8 °C)]   Pulse:  [59-77]   Resp:  [16-19]   BP: (118-144)/(66-84)   SpO2:  [99 %-100 %] .   Home meds for hypertension were reviewed and noted below.   Hypertension Medications              furosemide (LASIX) 40 MG tablet Take 1 tablet (40 mg total) by mouth once daily.    lisinopriL (PRINIVIL,ZESTRIL) 5 MG tablet Take 5 mg by mouth.     While in the hospital, will manage blood pressure as follows; Continue home antihypertensive regimen    Will utilize p.r.n. blood pressure medication only if patient's blood pressure greater than  180/110 and he develops symptoms such as worsening chest pain or shortness of breath.

## 2025-06-17 NOTE — ASSESSMENT & PLAN NOTE
Baseline creatinine is 1.38. Most recent creatinine and eGFR are listed below.  Recent Labs     06/15/25  0449 06/16/25  0451 06/16/25  0703   CREATININE 4.1* 3.1* 3.1*   EGFRNORACEVR 19* 27* 27*     Plan  -AMBER is currently undergoing medical management  -Avoid nephrotoxins and renally dose meds for GFR listed above  -Monitor urine output, serial BMP, and adjust therapy as needed  -f/u nephrology  recs

## 2025-06-17 NOTE — ASSESSMENT & PLAN NOTE
Anemia is likely due to chronic disease due to lupus nephritis. Most recent hemoglobin and hematocrit are listed below.  Recent Labs     06/15/25  0449 06/15/25  2013 06/16/25  0451   HGB 6.9* 8.3* 8.6*   HCT 20.4* 23.2* 24.7*     Plan  -Monitor serial CBC: Every 12 hours  -Transfuse PRBC if patient becomes hemodynamically unstable, symptomatic or H/H drops below 7/21.  Si  -Patient has received 2 units of PRBCs on 06/13/25  -Patient's anemia is currently undergoing medical management

## 2025-06-18 LAB
ABSOLUTE EOSINOPHIL (OHS): 0.01 K/UL
ABSOLUTE EOSINOPHIL (OHS): 0.02 K/UL
ABSOLUTE MONOCYTE (OHS): 0.78 K/UL (ref 0.3–1)
ABSOLUTE MONOCYTE (OHS): 0.87 K/UL (ref 0.3–1)
ABSOLUTE NEUTROPHIL COUNT (OHS): 3.67 K/UL (ref 1.8–7.7)
ABSOLUTE NEUTROPHIL COUNT (OHS): 3.8 K/UL (ref 1.8–7.7)
ANION GAP (OHS): 5 MMOL/L (ref 8–16)
BASOPHILS # BLD AUTO: 0 K/UL
BASOPHILS # BLD AUTO: 0.03 K/UL
BASOPHILS NFR BLD AUTO: 0 %
BASOPHILS NFR BLD AUTO: 0.5 %
BLOOD GROUP ANTIBODIES SERPL: NORMAL
BUN SERPL-MCNC: 59 MG/DL (ref 6–20)
CALCIUM SERPL-MCNC: 6.6 MG/DL (ref 8.7–10.5)
CHLORIDE SERPL-SCNC: 109 MMOL/L (ref 95–110)
CO2 SERPL-SCNC: 25 MMOL/L (ref 23–29)
CREAT SERPL-MCNC: 1.7 MG/DL (ref 0.5–1.4)
CREAT UR-MCNC: 83 MG/DL (ref 23–375)
ERYTHROCYTE [DISTWIDTH] IN BLOOD BY AUTOMATED COUNT: 15.9 % (ref 11.5–14.5)
ERYTHROCYTE [DISTWIDTH] IN BLOOD BY AUTOMATED COUNT: 16.1 % (ref 11.5–14.5)
GFR SERPLBLD CREATININE-BSD FMLA CKD-EPI: 56 ML/MIN/1.73/M2
GLUCOSE SERPL-MCNC: 73 MG/DL (ref 70–110)
HCT VFR BLD AUTO: 18.9 % (ref 40–54)
HCT VFR BLD AUTO: 20.8 % (ref 40–54)
HGB BLD-MCNC: 6.6 GM/DL (ref 14–18)
HGB BLD-MCNC: 7.3 GM/DL (ref 14–18)
IMM GRANULOCYTES # BLD AUTO: 0.09 K/UL (ref 0–0.04)
IMM GRANULOCYTES # BLD AUTO: 0.12 K/UL (ref 0–0.04)
IMM GRANULOCYTES NFR BLD AUTO: 1.6 % (ref 0–0.5)
IMM GRANULOCYTES NFR BLD AUTO: 1.9 % (ref 0–0.5)
INDIRECT COOMBS: ABNORMAL
LYMPHOCYTES # BLD AUTO: 1.19 K/UL (ref 1–4.8)
LYMPHOCYTES # BLD AUTO: 1.51 K/UL (ref 1–4.8)
MAGNESIUM SERPL-MCNC: 1.9 MG/DL (ref 1.6–2.6)
MCH RBC QN AUTO: 28.7 PG (ref 27–31)
MCH RBC QN AUTO: 28.9 PG (ref 27–31)
MCHC RBC AUTO-ENTMCNC: 34.9 G/DL (ref 32–36)
MCHC RBC AUTO-ENTMCNC: 35.1 G/DL (ref 32–36)
MCV RBC AUTO: 82 FL (ref 82–98)
MCV RBC AUTO: 82 FL (ref 82–98)
NUCLEATED RBC (/100WBC) (OHS): 0 /100 WBC
NUCLEATED RBC (/100WBC) (OHS): 0 /100 WBC
PHOSPHATE SERPL-MCNC: 3.9 MG/DL (ref 2.7–4.5)
PLATELET # BLD AUTO: 185 K/UL (ref 150–450)
PLATELET # BLD AUTO: 205 K/UL (ref 150–450)
PMV BLD AUTO: 10 FL (ref 9.2–12.9)
PMV BLD AUTO: 10.1 FL (ref 9.2–12.9)
POTASSIUM SERPL-SCNC: 3.4 MMOL/L (ref 3.5–5.1)
PROT UR-MCNC: 565 MG/DL
PROT/CREAT UR: 6.81 MG/G{CREAT}
RBC # BLD AUTO: 2.3 M/UL (ref 4.6–6.2)
RBC # BLD AUTO: 2.53 M/UL (ref 4.6–6.2)
RELATIVE EOSINOPHIL (OHS): 0.2 %
RELATIVE EOSINOPHIL (OHS): 0.3 %
RELATIVE LYMPHOCYTE (OHS): 20.7 % (ref 18–48)
RELATIVE LYMPHOCYTE (OHS): 23.8 % (ref 18–48)
RELATIVE MONOCYTE (OHS): 13.6 % (ref 4–15)
RELATIVE MONOCYTE (OHS): 13.7 % (ref 4–15)
RELATIVE NEUTROPHIL (OHS): 59.8 % (ref 38–73)
RELATIVE NEUTROPHIL (OHS): 63.9 % (ref 38–73)
RH BLD: ABNORMAL
SODIUM SERPL-SCNC: 139 MMOL/L (ref 136–145)
SPECIMEN OUTDATE: ABNORMAL
WBC # BLD AUTO: 5.74 K/UL (ref 3.9–12.7)
WBC # BLD AUTO: 6.35 K/UL (ref 3.9–12.7)

## 2025-06-18 PROCEDURE — 84100 ASSAY OF PHOSPHORUS: CPT

## 2025-06-18 PROCEDURE — 86870 RBC ANTIBODY IDENTIFICATION: CPT

## 2025-06-18 PROCEDURE — 36415 COLL VENOUS BLD VENIPUNCTURE: CPT

## 2025-06-18 PROCEDURE — 25000003 PHARM REV CODE 250

## 2025-06-18 PROCEDURE — 80048 BASIC METABOLIC PNL TOTAL CA: CPT

## 2025-06-18 PROCEDURE — 83735 ASSAY OF MAGNESIUM: CPT

## 2025-06-18 PROCEDURE — 11000001 HC ACUTE MED/SURG PRIVATE ROOM

## 2025-06-18 PROCEDURE — 82570 ASSAY OF URINE CREATININE: CPT | Performed by: STUDENT IN AN ORGANIZED HEALTH CARE EDUCATION/TRAINING PROGRAM

## 2025-06-18 PROCEDURE — 85025 COMPLETE CBC W/AUTO DIFF WBC: CPT

## 2025-06-18 PROCEDURE — 25000003 PHARM REV CODE 250: Performed by: STUDENT IN AN ORGANIZED HEALTH CARE EDUCATION/TRAINING PROGRAM

## 2025-06-18 PROCEDURE — 63600175 PHARM REV CODE 636 W HCPCS

## 2025-06-18 PROCEDURE — 63600175 PHARM REV CODE 636 W HCPCS: Mod: TB | Performed by: STUDENT IN AN ORGANIZED HEALTH CARE EDUCATION/TRAINING PROGRAM

## 2025-06-18 PROCEDURE — 86900 BLOOD TYPING SEROLOGIC ABO: CPT

## 2025-06-18 PROCEDURE — 99223 1ST HOSP IP/OBS HIGH 75: CPT | Mod: ,,, | Performed by: INTERNAL MEDICINE

## 2025-06-18 RX ORDER — SODIUM CHLORIDE 0.9 % (FLUSH) 0.9 %
10 SYRINGE (ML) INJECTION EVERY 12 HOURS PRN
Status: DISCONTINUED | OUTPATIENT
Start: 2025-06-18 | End: 2025-06-20 | Stop reason: HOSPADM

## 2025-06-18 RX ORDER — PANTOPRAZOLE SODIUM 40 MG/1
40 TABLET, DELAYED RELEASE ORAL DAILY
Status: DISCONTINUED | OUTPATIENT
Start: 2025-06-18 | End: 2025-06-20 | Stop reason: HOSPADM

## 2025-06-18 RX ORDER — POTASSIUM CHLORIDE 20 MEQ/1
20 TABLET, EXTENDED RELEASE ORAL ONCE
Status: COMPLETED | OUTPATIENT
Start: 2025-06-18 | End: 2025-06-18

## 2025-06-18 RX ORDER — MYCOPHENOLATE MOFETIL 250 MG/1
1500 CAPSULE ORAL 2 TIMES DAILY
Status: DISCONTINUED | OUTPATIENT
Start: 2025-06-18 | End: 2025-06-20 | Stop reason: HOSPADM

## 2025-06-18 RX ORDER — IBUPROFEN 200 MG
24 TABLET ORAL
Status: DISCONTINUED | OUTPATIENT
Start: 2025-06-18 | End: 2025-06-20 | Stop reason: HOSPADM

## 2025-06-18 RX ORDER — GLUCAGON 1 MG
1 KIT INJECTION
Status: DISCONTINUED | OUTPATIENT
Start: 2025-06-18 | End: 2025-06-20 | Stop reason: HOSPADM

## 2025-06-18 RX ORDER — FAMOTIDINE 20 MG/1
20 TABLET, FILM COATED ORAL 2 TIMES DAILY
Status: CANCELLED | OUTPATIENT
Start: 2025-06-18

## 2025-06-18 RX ORDER — IBUPROFEN 200 MG
16 TABLET ORAL
Status: DISCONTINUED | OUTPATIENT
Start: 2025-06-18 | End: 2025-06-20 | Stop reason: HOSPADM

## 2025-06-18 RX ORDER — ENOXAPARIN SODIUM 100 MG/ML
40 INJECTION SUBCUTANEOUS EVERY 24 HOURS
Status: DISCONTINUED | OUTPATIENT
Start: 2025-06-18 | End: 2025-06-20 | Stop reason: HOSPADM

## 2025-06-18 RX ORDER — NALOXONE HCL 0.4 MG/ML
0.02 VIAL (ML) INJECTION
Status: DISCONTINUED | OUTPATIENT
Start: 2025-06-18 | End: 2025-06-20 | Stop reason: HOSPADM

## 2025-06-18 RX ORDER — MUPIROCIN 20 MG/G
OINTMENT TOPICAL 2 TIMES DAILY
Status: DISCONTINUED | OUTPATIENT
Start: 2025-06-18 | End: 2025-06-20 | Stop reason: HOSPADM

## 2025-06-18 RX ORDER — CALCIUM GLUCONATE 20 MG/ML
1 INJECTION, SOLUTION INTRAVENOUS
Status: DISCONTINUED | OUTPATIENT
Start: 2025-06-18 | End: 2025-06-18

## 2025-06-18 RX ORDER — LANOLIN ALCOHOL/MO/W.PET/CERES
1 CREAM (GRAM) TOPICAL DAILY
Status: DISCONTINUED | OUTPATIENT
Start: 2025-06-18 | End: 2025-06-20 | Stop reason: HOSPADM

## 2025-06-18 RX ORDER — HYDROXYCHLOROQUINE SULFATE 200 MG/1
400 TABLET, FILM COATED ORAL DAILY
Status: DISCONTINUED | OUTPATIENT
Start: 2025-06-18 | End: 2025-06-19

## 2025-06-18 RX ADMIN — METHYLPREDNISOLONE SODIUM SUCCINATE 500 MG: 500 INJECTION INTRAMUSCULAR; INTRAVENOUS at 04:06

## 2025-06-18 RX ADMIN — MYCOPHENOLATE MOFETIL 1500 MG: 250 CAPSULE ORAL at 08:06

## 2025-06-18 RX ADMIN — PANTOPRAZOLE SODIUM 40 MG: 40 TABLET, DELAYED RELEASE ORAL at 08:06

## 2025-06-18 RX ADMIN — ENOXAPARIN SODIUM 40 MG: 40 INJECTION SUBCUTANEOUS at 04:06

## 2025-06-18 RX ADMIN — MUPIROCIN: 20 OINTMENT TOPICAL at 09:06

## 2025-06-18 RX ADMIN — POTASSIUM CHLORIDE 20 MEQ: 1500 TABLET, EXTENDED RELEASE ORAL at 04:06

## 2025-06-18 RX ADMIN — MYCOPHENOLATE MOFETIL 1500 MG: 250 CAPSULE ORAL at 09:06

## 2025-06-18 RX ADMIN — HYDROXYCHLOROQUINE SULFATE 400 MG: 200 TABLET, FILM COATED ORAL at 08:06

## 2025-06-18 RX ADMIN — FERROUS SULFATE TAB EC 325 MG (65 MG FE EQUIVALENT) 1 EACH: 325 (65 FE) TABLET DELAYED RESPONSE at 08:06

## 2025-06-18 RX ADMIN — PREDNISONE 15 MG: 2.5 TABLET ORAL at 08:06

## 2025-06-18 NOTE — ASSESSMENT & PLAN NOTE
Hyperkalemia is likely due to AMBER.The patients most recent potassium results are listed below.  Recent Labs     06/16/25  0451 06/16/25  0703 06/17/25  0453   K 4.5 3.8 3.7     Plan  - Monitor for arrhythmias with EKG and/or continuous telemetry.   - Treat the hyperkalemia with Potassium Binders.   - Monitor potassium: Daily  - The patient's hyperkalemia is stable

## 2025-06-18 NOTE — ASSESSMENT & PLAN NOTE
Anemia is likely due to chronic disease due to SLE. Most recent hemoglobin and hematocrit are listed below.  Recent Labs     06/15/25  2013 06/16/25  0451 06/17/25  0453   HGB 8.3* 8.6* 7.4*   HCT 23.2* 24.7* 21.2*     Plan  - Monitor serial CBC: Daily  - Transfuse PRBC if patient becomes hemodynamically unstable, symptomatic or H/H drops below 7/21.  - Patient has received 2 units of PRBCs on 6/13/25  - Patient's anemia is currently stable

## 2025-06-18 NOTE — HPI
25 year old man with lupus nephritis (bx in October 2024) followed by [facility] transferred from [facility] for rheumatology evaluation. He initially presented for acute anemia (Hg 5.1) incidentally found on routine labs and was referred to the [facility] ED. He received 2 units pRBC at the OSH for his acute anemia and Hg has remained stable. No episodes of bleeding reported. Initial Cr 4.4 on admission with improvement once lupus meds were restarted. He reports that he stopped taking his lupus medications in March because he was not used to taking medications. He is on mycophenolate, Plaquenil, prednisone and Voclosporin. He started taking his meds again in April and stopped on June 5th after an episode of vomiting. He was transferred to [facility] for rheumatology evaluation for lupus nephritis.

## 2025-06-18 NOTE — SUBJECTIVE & OBJECTIVE
Past Medical History:   Diagnosis Date    Marijuana use     Systemic lupus erythematosus, organ or system involvement unspecified        Past Surgical History:   Procedure Laterality Date    NO PAST SURGERIES           There is no immunization history on file for this patient.    Review of patient's allergies indicates:   Allergen Reactions    Sulfa (sulfonamide antibiotics)     Sulfamethoxazole-trimethoprim Other (See Comments)     Current Facility-Administered Medications   Medication Frequency    dextrose 50% injection 12.5 g PRN    dextrose 50% injection 25 g PRN    enoxaparin injection 40 mg Daily    ferrous sulfate tablet 1 each Daily    glucagon (human recombinant) injection 1 mg PRN    glucose chewable tablet 16 g PRN    glucose chewable tablet 24 g PRN    hydroxychloroquine tablet 400 mg Daily    methylPREDNISolone sodium succinate (SOLU-MEDROL) 500 mg in 0.9% NaCl 100 mL IVPB Q24H    mupirocin 2 % ointment BID    mycophenolate capsule 1,500 mg BID    naloxone 0.4 mg/mL injection 0.02 mg PRN    pantoprazole EC tablet 40 mg Daily    sodium chloride 0.9% flush 10 mL Q12H PRN     Family History       Problem Relation (Age of Onset)    Diabetes Brother    Heart failure Brother    Hypertension Father    No Known Problems Mother    Sickle cell trait Sister          Tobacco Use    Smoking status: Former     Types: Cigarettes    Smokeless tobacco: Former   Vaping Use    Vaping status: Never Used   Substance and Sexual Activity    Alcohol use: Not Currently    Drug use: Yes     Types: Marijuana    Sexual activity: Not on file     Review of Systems   Constitutional:  Positive for activity change, fatigue and unexpected weight change. Negative for chills and fever.   HENT:  Negative for mouth sores and sinus pain.    Respiratory:  Negative for cough and shortness of breath.    Cardiovascular:  Positive for leg swelling. Negative for chest pain and palpitations.   Gastrointestinal:  Negative  for abdominal distention, abdominal pain, diarrhea, nausea and vomiting.   Genitourinary:  Negative for dysuria.   Musculoskeletal:  Negative for arthralgias, gait problem, joint swelling and myalgias.   Skin:  Negative for color change and rash.     Objective:     Vital Signs (Most Recent):  Temp: 98.1 °F (36.7 °C) (06/18/25 1130)  Pulse: 72 (06/18/25 1130)  Resp: 16 (06/18/25 1130)  BP: 126/75 (06/18/25 1130)  SpO2: 100 % (06/18/25 1130) Vital Signs (24h Range):  Temp:  [97.9 °F (36.6 °C)-98.3 °F (36.8 °C)] 98.1 °F (36.7 °C)  Pulse:  [65-86] 72  Resp:  [16-18] 16  SpO2:  [100 %] 100 %  BP: (126-134)/(67-82) 126/75     Weight: 78.9 kg (174 lb) (06/17/25 2345)  Body mass index is 25.7 kg/m².  Body surface area is 1.96 meters squared.    No intake or output data in the 24 hours ending 06/18/25 1639      Physical Exam   Constitutional: He is oriented to person, place, and time. No distress. He does not appear ill.   HENT:   Head: Normocephalic and atraumatic.   Mouth/Throat: Mucous membranes are moist.   Eyes: Right eye exhibits no discharge. Left eye exhibits no discharge. No scleral icterus.   Cardiovascular: Normal rate and regular rhythm.   No murmur heard.  Pulmonary/Chest: No respiratory distress. He has no wheezes. He has no rales.   Abdominal: There is no abdominal tenderness.   Musculoskeletal:         General: No tenderness.      Right lower leg: Edema present.      Left lower leg: Edema present.      Comments: Swelling of bilateral elbows to forearm + bilateral below knee swelling    Neurological: He is oriented to person, place, and time.   Skin: No lesion and no rash noted.        Significant Labs:  All pertinent lab results from the last 24 hours have been reviewed.    Significant Imaging:  Imaging results within the past 24 hours have been reviewed.

## 2025-06-18 NOTE — PLAN OF CARE
Problem: Adult Inpatient Plan of Care  Goal: Plan of Care Review  6/18/2025 1743 by Enrique Lyman RN  Outcome: Progressing  6/18/2025 1743 by Enrique Lyman RN  Outcome: Progressing  Goal: Patient-Specific Goal (Individualized)  6/18/2025 1743 by Enrique Lyman RN  Outcome: Progressing  6/18/2025 1743 by Enrique Lyman RN  Outcome: Progressing  Goal: Absence of Hospital-Acquired Illness or Injury  6/18/2025 1743 by Enrique Lyman RN  Outcome: Progressing  6/18/2025 1743 by Enrique Lyman RN  Outcome: Progressing  Goal: Optimal Comfort and Wellbeing  6/18/2025 1743 by Enrique Lyman RN  Outcome: Progressing  6/18/2025 1743 by Enrique Lyman RN  Outcome: Progressing  Goal: Readiness for Transition of Care  6/18/2025 1743 by Enrique Lyman RN  Outcome: Progressing  6/18/2025 1743 by Enrique Lyman RN  Outcome: Progressing     Problem: Acute Kidney Injury/Impairment  Goal: Fluid and Electrolyte Balance  6/18/2025 1743 by Enrique Lyman RN  Outcome: Progressing  6/18/2025 1743 by Enrique Lyman RN  Outcome: Progressing  Goal: Improved Oral Intake  6/18/2025 1743 by Enrique Lyman RN  Outcome: Progressing  6/18/2025 1743 by Enrique Lyman RN  Outcome: Progressing  Goal: Effective Renal Function  6/18/2025 1743 by Enrique Lyman RN  Outcome: Progressing  6/18/2025 1743 by Enrique Lyman RN  Outcome: Progressing

## 2025-06-18 NOTE — CARE UPDATE
Unit [facility] Care Support Interaction      I have reviewed the chart of Maikel Deleon who is hospitalized for Lupus nephritis. The patient is currently located in the following unit: [facility]        I have assisted the primary physician in management of the following:      MRSA Decolonization - Mupirocin ordered and CHG ordered     Bernadette Dominique PA-C  Unit Based [facility]

## 2025-06-18 NOTE — CONSULTS
[facility] - Med Surg  Rheumatology  Consult Note    Patient Name: Maikel Deleon  MRN: [id number]  Admission Date: 6/17/2025  Hospital Length of Stay: 1 days  Code Status: Full Code   Attending Provider: Agustín Pena MD  Primary Care Physician: Elaina, Primary Doctor  Principal Problem:Lupus nephritis    Inpatient consult to Rheumatology  Consult performed by: Corine Gonzalez MD  Consult ordered by: Esther Simmons DO        Subjective:     HPI: Maikel Deleon is a  25 y.o. male with SLE c/b biopsy-proven Class III/V lupus nephritis (October 2024) who was admitted to [place] on 6/12 at the request of his rheumatologist for abnormal outpatient labs showing worsening renal function and anemia. For management of SLE, he is currently prescribed plaquenil 300mg, Cellcept 1500mg BID, prednisone 15mg daily, and voclosporin 23.7mg. He has had intermitted compliance with meds due to concerns for side effects, specifically nausea/vomiting, a chronic issue for him. He reports he had not taken any of his meds since June 5th d/t N/V. He was found to have a Cr of 4.4 from baseline of 1.3, hyperkalemia to 5.7, and hgb of 5.2. He was transfused 3 total units prbc with refractory anemia. Nephrology consulted, and thought AMBER to be due to prerenal etiology/IVVD due to severe anemia. His prednisone, MMF, and cellcept were resumed. Given the recurrent anemia and hypocomplementemia, he was transferred to [facility] for further evaluation by rheumatology.     On arrival to [facility], he is HDS on RA. CBC notable for hgb 7.3, WBC and plts WNL. Cr 1.7 and is improving. Hyperkalemia resolved. Albumin 1.0. On 6/15, C3 low at 41, C4 low at 8. CRP nml at 1.48. Ds-DNA was again negative on 6/15.    Patient of Dr. Correia/Laury (LOV 6/12/25).     Rheum History:  - Normal state of health until summer 2024, when he developed SOB, acute renal insufficiency and severe peripheral edema  October 19, 2024 when he presented to the [facility] in [facility]  [place]  - Labs/Serologies: cr 2.7, UPCR 6gm, PERRY 1: 2560, positive SSA, positive anti Flores, positive RNP, negative double-stranded DNA, low C3, low C4  - Kidney bx that admission confirmed class 3/5 lupus nephritis  - He received 1 g of IV Solu-Medrol for 3 days and then transitioned to prednisone 60 mg daily. CellCept 500 mg BID and Plaquenil 200 mg day were also added.   - December 2024, nephrology decreased prednisone to 20 mg/d, Cellcept increased to 1500 mg BID and remained on  mg/d. After this visit, he had to transition care to [facility] due to insurance reasons  - Established with [facility] Feb 2025, who noted compliance with medications. 10mg lisinopril added for additional management of proteinuria, however patient did not start.   - Established with rheumatology in March 2025, who decrease pred to 15mg and started voclosporin in addition to continued Cellcept and plaquenil.   - Saw nephrology in May, who noted that he had been off his meds for several weeks due to n/v that he presumed were side effects of the medications. He was encouraged to continue the medications. 10g proteinuria at that visit.   - Again saw rheumatology 6/12. He noted some LE edema but otherwise no symptom flares. They recommended c/w Cellcept 1500mg BID, PDN 15 mg/d,  mg/d and voclosporin 23.7 BID. Routine labs ordered, which ultimately led to admission.       Rheum ROS:  No fevers or chills  + unintentional weight loss - 30-40 lbs   No rash   No malar rash or photosensitivity  No oral ulcers    No genital ulcers  No dry eyes and dry mouth   No conjunctivitis or uveitis or scleritis or episcleritis  No SOB  No dysphagia  No raynaud's   No digital ulcers   No bloody diarrhea  No focal weakness  No joint pain  + peripheral swelling      History  Social: denies alcohol, tobacco, drug use. Former marijuana use, stopped last summer  Family: denies family h/o autoimmune disease; father is adopted - unknown family  history      Past Medical History:   Diagnosis Date    Marijuana use     Systemic lupus erythematosus, organ or system involvement unspecified        Past Surgical History:   Procedure Laterality Date    NO PAST SURGERIES           There is no immunization history on file for this patient.    Review of patient's allergies indicates:   Allergen Reactions    Sulfa (sulfonamide antibiotics)     Sulfamethoxazole-trimethoprim Other (See Comments)     Current Facility-Administered Medications   Medication Frequency    dextrose 50% injection 12.5 g PRN    dextrose 50% injection 25 g PRN    enoxaparin injection 40 mg Daily    ferrous sulfate tablet 1 each Daily    glucagon (human recombinant) injection 1 mg PRN    glucose chewable tablet 16 g PRN    glucose chewable tablet 24 g PRN    hydroxychloroquine tablet 400 mg Daily    methylPREDNISolone sodium succinate (SOLU-MEDROL) 500 mg in 0.9% NaCl 100 mL IVPB Q24H    mupirocin 2 % ointment BID    mycophenolate capsule 1,500 mg BID    naloxone 0.4 mg/mL injection 0.02 mg PRN    pantoprazole EC tablet 40 mg Daily    sodium chloride 0.9% flush 10 mL Q12H PRN     Family History       Problem Relation (Age of Onset)    Diabetes Brother    Heart failure Brother    Hypertension Father    No Known Problems Mother    Sickle cell trait Sister          Tobacco Use    Smoking status: Former     Types: Cigarettes    Smokeless tobacco: Former   Vaping Use    Vaping status: Never Used   Substance and Sexual Activity    Alcohol use: Not Currently    Drug use: Yes     Types: Marijuana    Sexual activity: Not on file     Review of Systems   Constitutional:  Positive for activity change, fatigue and unexpected weight change. Negative for chills and fever.   HENT:  Negative for mouth sores and sinus pain.    Respiratory:  Negative for cough and shortness of breath.    Cardiovascular:  Positive for leg swelling. Negative for chest pain and palpitations.   Gastrointestinal:  Negative for abdominal  distention, abdominal pain, diarrhea, nausea and vomiting.   Genitourinary:  Negative for dysuria.   Musculoskeletal:  Negative for arthralgias, gait problem, joint swelling and myalgias.   Skin:  Negative for color change and rash.     Objective:     Vital Signs (Most Recent):  Temp: 98.1 °F (36.7 °C) (06/18/25 1130)  Pulse: 72 (06/18/25 1130)  Resp: 16 (06/18/25 1130)  BP: 126/75 (06/18/25 1130)  SpO2: 100 % (06/18/25 1130) Vital Signs (24h Range):  Temp:  [97.9 °F (36.6 °C)-98.3 °F (36.8 °C)] 98.1 °F (36.7 °C)  Pulse:  [65-86] 72  Resp:  [16-18] 16  SpO2:  [100 %] 100 %  BP: (126-134)/(67-82) 126/75     Weight: 78.9 kg (174 lb) (06/17/25 2345)  Body mass index is 25.7 kg/m².  Body surface area is 1.96 meters squared.    No intake or output data in the 24 hours ending 06/18/25 1639      Physical Exam   Constitutional: He is oriented to person, place, and time. No distress. He does not appear ill.   HENT:   Head: Normocephalic and atraumatic.   Mouth/Throat: Mucous membranes are moist.   Eyes: Right eye exhibits no discharge. Left eye exhibits no discharge. No scleral icterus.   Cardiovascular: Normal rate and regular rhythm.   No murmur heard.  Pulmonary/Chest: No respiratory distress. He has no wheezes. He has no rales.   Abdominal: There is no abdominal tenderness.   Musculoskeletal:         General: No tenderness.      Right lower leg: Edema present.      Left lower leg: Edema present.      Comments: Swelling of bilateral elbows to forearm + bilateral below knee swelling    Neurological: He is oriented to person, place, and time.   Skin: No lesion and no rash noted.        Significant Labs:  All pertinent lab results from the last 24 hours have been reviewed.    Significant Imaging:  Imaging results within the past 24 hours have been reviewed.   Assessment/Plan:     *SLE  * Lupus nephritis  Home regimen:   - Cellcept 1500mg BID  - PDN 15 mg/d  -  mg/d  - Voclosporin 23.7mg BID    Serologies/labs:  PERRY+  1:2561, speckled, + SSA (5.47), +Anti-sm Ab (6.84), + Anti-sm/RNP (6.21)  dsDNA negative  Protein/Cr ratio 6000 mg   ANCA, SCL and RNA PolIII <20 neg  C3 (41) and C4 (8) - low  APS labs neg    Kidney bx 10/2024:   1) membranous glomerulonephritis.   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   3)  tubular basement membrane deposits (see comment).      Assessment:   Maikel Deleon is a 25 y.o. male with SLE c/b biopsy-proven Class III/V lupus nephritis (October 2024) who was admitted to [place] on 6/12 at the request of his rheumatologist for abnormal outpatient labs showing worsening renal function and anemia. He reports non-compliance with medications early June d/t N/V, which he attributed to the meds. SLEDAI 10 (hematuria, pyuria, low complements; UPCR pending) - consistent with lupus flare; likely d/t medication non-compliance.    Recommendations:  - IV solumedrol 500 mg q24H x 3 doses  - Check SWAPNA and UPCR  - Continue home meds: Cellcept 1500mg BID,  mg/d, and Voclosporin 23.7 mg BID  - Nephrology consult, appreciate assistance     Thank you for your consult. Assessment and plan discussed with attending physician. Please see attending attestation for official recommendations.        Corine Gonzalez MD  Rheumatology  [facility] Surg    I have personally taken the history and examined the patient and concur with the resident's note as above.  He has a 1 year history of SLE with class 3/5 lupus nephritis.  He has been sporadic in taking his medications.  He has had a flare of his disease with increased anasarca.  He had increased creatinine which has responded to hydration.  We will treat him with pulse Solu-Medrol.  He is to resume his mycophenolate, Plaquenil, and Voclosporin.  We will consult Nephrology for help in managing his nephrotic syndrome.  We will plan on him following up with [facility] post discharge.

## 2025-06-18 NOTE — NURSING
Received transfer from [facility] [place] via ambulance 26 y/o bm with dx of lupus. Aaox4, no distress noted. No complaints at present. Hospital med on call notified of arrival. Awaiting orders.

## 2025-06-18 NOTE — PLAN OF CARE
[facility] - Med Surg  Initial Discharge Assessment       Primary Care Provider: No, Primary Doctor    Admission Diagnosis: Lupus (systemic lupus erythematosus) [M32.9]  Acute renal insufficiency [N28.9]  Lupus nephritis [M32.14]    Admission Date: 6/17/2025  Expected Discharge Date: 6/20/2025    Transition of Care Barriers: None    Payor: MEDICAID / Plan: [facility] / Product Type: Managed Medicaid /     Extended Emergency Contact Information  Primary Emergency Contact: kathia deleon  Mobile Phone: [phone]  Relation: Mother  Preferred language: English   needed? No    Discharge Plan A: Home, Home with family  Discharge Plan B: Home    No Pharmacies Listed    Initial Assessment (most recent)       Adult Discharge Assessment - 06/18/25 1613          Discharge Assessment    Assessment Type Discharge Planning Reassessment     Confirmed/corrected address, phone number and insurance Yes     Confirmed Demographics Correct on Facesheet     Source of Information patient     Communicated LUIGI with patient/caregiver No     People in Home alone     Facility Arrived From: Transferred from [facility].     Do you expect to return to your current living situation? Yes     Do you have help at home or someone to help you manage your care at home? Yes     Who are your caregiver(s) and their phone number(s)? (Mother)Kathia Deleon [phone]     Prior to hospitilization cognitive status: Alert/Oriented     Current cognitive status: Alert/Oriented     Walking or Climbing Stairs Difficulty no     Dressing/Bathing Difficulty no     Home Accessibility wheelchair accessible     Equipment Currently Used at Home none     Readmission within 30 days? Yes   Pt was transferred [facility] to [facility]    Patient currently being followed by outpatient case management? No     Do you currently have service(s) that help you manage your care at home? No     Do you take prescription medications? Yes     Do you  have prescription coverage? Yes     Do you have any problems affording any of your prescribed medications? No     Is the patient taking medications as prescribed? yes     Who is going to help you get home at discharge? Mother Margarito Deleon [phone]     How do you get to doctors appointments? car, drives self     Are you on dialysis? No     Do you take coumadin? No     Discharge Plan A Home;Home with family     Discharge Plan B Home     DME Needed Upon Discharge  none     Discharge Plan discussed with: Patient     Transition of Care Barriers None        Physical Activity    On average, how many days per week do you engage in moderate to strenuous exercise (like a brisk walk)? 0 days     On average, how many minutes do you engage in exercise at this level? 0 min        Financial Resource Strain    How hard is it for you to pay for the very basics like food, housing, medical care, and heating? Not hard at all        Housing Stability    In the last 12 months, was there a time when you were not able to pay the mortgage or rent on time? No     At any time in the past 12 months, were you homeless or living in a shelter (including now)? No        Transportation Needs    In the past 12 months, has lack of transportation kept you from medical appointments or from getting medications? No     In the past 12 months, has lack of transportation kept you from meetings, work, or from getting things needed for daily living? No        Food Insecurity    Within the past 12 months, you worried that your food would run out before you got the money to buy more. Never true     Within the past 12 months, the food you bought just didn't last and you didn't have money to get more. Never true        Stress    Do you feel stress - tense, restless, nervous, or anxious, or unable to sleep at night because your mind is troubled all the time - these days? Not at all        Social Isolation    How often do you feel lonely or isolated from  those around you?  Never        Alcohol Use    Q1: How often do you have a drink containing alcohol? Never     Q2: How many drinks containing alcohol do you have on a typical day when you are drinking? Patient does not drink     Q3: How often do you have six or more drinks on one occasion? Never        Utilities    In the past 12 months has the electric, gas, oil, or water company threatened to shut off services in your home? No        Health Literacy    How often do you need to have someone help you when you read instructions, pamphlets, or other written material from your doctor or pharmacy? Never                   SW met Pt to discuss his intake assessment.  Pt was alert, he lives alone with family support.  Margarito Deleon ([phone]) mother. He reported being ambulatory and managing his adls well. No DME, Pt does not receive coumadin ,dialysis ,or  home health service.    Disp:  Pt not medically ready for d/c -Consult for Rheum.   1. Home. No needs once medically ready.    Discharge Plan A and Plan B have been determined by review of patient's clinical status, future medical and therapeutic needs, and coverage/benefits for post-acute care in coordination with multidisciplinary team members.     Davey GAY/  Case Management

## 2025-06-18 NOTE — ASSESSMENT & PLAN NOTE
Patient with known lupus nephritis admitted with AMBER that is resolving now on lupus medications.    Plan  -continue hydroxychloroquine, MMF, prednisone, voclosporin   -follow up rheumatology recommendations  -monitor renal functon with daily BMP  -renal ultrasound normal

## 2025-06-18 NOTE — HPI
Maikel Deleon is a  25 y.o. male with SLE c/b biopsy-proven Class III/V lupus nephritis (October 2024) who was admitted to [place] on 6/12 at the request of his rheumatologist for abnormal outpatient labs showing worsening renal function and anemia. For management of SLE, he is currently prescribed plaquenil 300mg, Cellcept 1500mg BID, prednisone 15mg daily, and voclosporin 23.7mg. He has had intermitted compliance with meds due to concerns for side effects, specifically nausea/vomiting, a chronic issue for him. He reports he had not taken any of his meds since June 5th d/t N/V. He was found to have a Cr of 4.4 from baseline of 1.3, hyperkalemia to 5.7, and hgb of 5.2. He was transfused 3 total units prbc with refractory anemia. Nephrology consulted, and thought AMBER to be due to prerenal etiology/IVVD due to severe anemia. His prednisone, MMF, and cellcept were resumed. Given the recurrent anemia and hypocomplementemia, he was transferred to [facility] for further evaluation by rheumatology.     On arrival to [facility], he is HDS on RA. CBC notable for hgb 7.3, WBC and plts WNL. Cr 1.7 and is improving. Hyperkalemia resolved. Albumin 1.0. On 6/15, C3 low at 41, C4 low at 8. CRP nml at 1.48. Ds-DNA was again negative on 6/15.    Patient of Dr. Correia/Laury (LOV 6/12/25).     Rheum History:  - Normal state of health until summer 2024, when he developed SOB, acute renal insufficiency and severe peripheral edema  October 19, 2024 when he presented to the [facility] ER in [place]  - Labs/Serologies: cr 2.7, UPCR 6gm, PERRY 1: 2560, positive SSA, positive anti Flores, positive RNP, negative double-stranded DNA, low C3, low C4  - Kidney bx that admission confirmed class 3/5 lupus nephritis  - He received 1 g of IV Solu-Medrol for 3 days and then transitioned to prednisone 60 mg daily. CellCept 500 mg BID and Plaquenil 200 mg day were also added.   - December 2024, nephrology decreased prednisone to 20 mg/d, Cellcept increased  to 1500 mg BID and remained on  mg/d. After this visit, he had to transition care to [facility] due to insurance reasons  - Established with [facility] Feb 2025, who noted compliance with medications. 10mg lisinopril added for additional management of proteinuria, however patient did not start.   - Established with rheumatology in March 2025, who decrease pred to 15mg and started voclosporin in addition to continued Cellcept and plaquenil.   - Saw nephrology in May, who noted that he had been off his meds for several weeks due to n/v that he presumed were side effects of the medications. He was encouraged to continue the medications. 10g proteinuria at that visit.   - Again saw rheumatology 6/12. He noted some LE edema but otherwise no symptom flares. They recommended c/w Cellcept 1500mg BID, PDN 15 mg/d,  mg/d and voclosporin 23.7 BID. Routine labs ordered, which ultimately led to admission.       Rheum ROS:  No fevers or chills  + unintentional weight loss - 30-40 lbs   No rash   No malar rash or photosensitivity  No oral ulcers    No genital ulcers  No dry eyes and dry mouth   No conjunctivitis or uveitis or scleritis or episcleritis  No SOB  No dysphagia  No raynaud's   No digital ulcers   No bloody diarrhea  No focal weakness  No joint pain  + peripheral swelling      History  Social: denies alcohol, tobacco, drug use. Former marijuana use, stopped last summer  Family: denies family h/o autoimmune disease; father is adopted - unknown family history

## 2025-06-18 NOTE — SUBJECTIVE & OBJECTIVE
Past Medical History:   Diagnosis Date    Marijuana use     Systemic lupus erythematosus, organ or system involvement unspecified        Past Surgical History:   Procedure Laterality Date    NO PAST SURGERIES         Review of patient's allergies indicates:   Allergen Reactions    Sulfa (sulfonamide antibiotics)     Sulfamethoxazole-trimethoprim Other (See Comments)       Current Facility-Administered Medications on File Prior to Encounter   Medication    [DISCONTINUED] 0.9%  NaCl infusion (for blood administration)    [DISCONTINUED] 0.9%  NaCl infusion (for blood administration)    [DISCONTINUED] acetaminophen suppository 650 mg    [DISCONTINUED] acetaminophen tablet 650 mg    [DISCONTINUED] albuterol-ipratropium 2.5 mg-0.5 mg/3 mL nebulizer solution 3 mL    [DISCONTINUED] calcium carbonate 200 mg calcium (500 mg) chewable tablet 500 mg    [DISCONTINUED] calcium gluconate 1 g in D5W 50 mL IVPB (MB+)    [DISCONTINUED] dextrose 50% injection 12.5 g    [DISCONTINUED] dextrose 50% injection 25 g    [DISCONTINUED] ferrous sulfate tablet 1 each    [DISCONTINUED] glucagon (human recombinant) injection 1 mg    [DISCONTINUED] glucose chewable tablet 16 g    [DISCONTINUED] glucose chewable tablet 24 g    [DISCONTINUED] HYDROcodone-acetaminophen 5-325 mg per tablet 1 tablet    [DISCONTINUED] hydroxychloroquine tablet 400 mg    [DISCONTINUED] melatonin tablet 6 mg    [DISCONTINUED] morphine injection 2 mg    [DISCONTINUED] mycophenolate tablet 1,500 mg    [DISCONTINUED] naloxone 0.4 mg/mL injection 0.02 mg    [DISCONTINUED] ondansetron injection 4 mg    [DISCONTINUED] pantoprazole EC tablet 40 mg    [DISCONTINUED] predniSONE tablet 15 mg    [DISCONTINUED] promethazine tablet 25 mg    [DISCONTINUED] senna-docusate 8.6-50 mg per tablet 1 tablet    [DISCONTINUED] sodium bicarbonate 75 mEq in 0.45% NaCl 1,075 mL infusion    [DISCONTINUED] sodium bicarbonate tablet 1,300 mg    [DISCONTINUED] sodium chloride 0.9% flush 10 mL     [DISCONTINUED] sodium zirconium cyclosilicate packet 10 g    [DISCONTINUED] voclosporin Cap 23.7 mg     Current Outpatient Medications on File Prior to Encounter   Medication Sig    calcium carbonate (TUMS) 200 mg calcium (500 mg) chewable tablet Take 500 mg by mouth.    diclofenac sodium (VOLTAREN) 1 % Gel Apply 2 g topically 4 (four) times daily as needed.    ergocalciferol (ERGOCALCIFEROL) 50,000 unit Cap Take 50,000 Units by mouth every 7 days.    ferrous sulfate (FEOSOL) 325 mg (65 mg iron) Tab tablet Take 1 tablet every day by oral route for 90 days.    furosemide (LASIX) 40 MG tablet Take 1 tablet (40 mg total) by mouth once daily.    hydroxychloroquine (PLAQUENIL) 200 mg tablet Take 200 mg by mouth. for 90 days    lisinopriL (PRINIVIL,ZESTRIL) 5 MG tablet Take 5 mg by mouth.    mycophenolate (CELLCEPT) 500 mg Tab Take 1 tablet (500 mg total) by mouth 2 (two) times daily.    ondansetron (ZOFRAN) 4 MG tablet Take 4 mg by mouth.    pantoprazole (PROTONIX) 40 MG tablet Take 1 tablet (40 mg total) by mouth once daily.    predniSONE (DELTASONE) 20 MG tablet Take 1 tablet every day by oral route for 90 days.    sodium bicarbonate 650 MG tablet Take 2 tablets (1,300 mg total) by mouth 3 (three) times daily.    voclosporin 7.9 mg Cap Take 23.7 mg by mouth.     Family History       Problem Relation (Age of Onset)    Diabetes Brother    Heart failure Brother    Hypertension Father    No Known Problems Mother    Sickle cell trait Sister          Tobacco Use    Smoking status: Former     Types: Cigarettes    Smokeless tobacco: Former   Vaping Use    Vaping status: Never Used   Substance and Sexual Activity    Alcohol use: Not Currently    Drug use: Yes     Types: Marijuana    Sexual activity: Not on file     Review of Systems  Objective:     Vital Signs (Most Recent):  Temp: 98 °F (36.7 °C) (06/18/25 0445)  Pulse: 69 (06/18/25 0445)  Resp: 18 (06/18/25 0445)  BP: 133/74 (06/18/25 0445)  SpO2: 100 % (06/18/25 0445) Vital  Signs (24h Range):  Temp:  [97.6 °F (36.4 °C)-98.3 °F (36.8 °C)] 98 °F (36.7 °C)  Pulse:  [] 69  Resp:  [17-18] 18  SpO2:  [98 %-100 %] 100 %  BP: (128-145)/(67-86) 133/74     Weight: 78.9 kg (174 lb)  Body mass index is 25.7 kg/m².     Physical Exam  Constitutional:       General: He is not in acute distress.     Appearance: He is normal weight.   HENT:      Head: Normocephalic and atraumatic.   Eyes:      Extraocular Movements: Extraocular movements intact.   Cardiovascular:      Rate and Rhythm: Normal rate and regular rhythm.   Pulmonary:      Effort: Pulmonary effort is normal. No respiratory distress.      Breath sounds: Normal breath sounds. No wheezing.   Abdominal:      Palpations: Abdomen is soft.   Musculoskeletal:      Right lower leg: Edema present.      Left lower leg: Edema present.      Comments: 1+ edema in BLE   Skin:     General: Skin is warm and dry.   Neurological:      Mental Status: He is alert. Mental status is at baseline.   Psychiatric:         Mood and Affect: Mood normal.         Behavior: Behavior normal.                Significant Labs: All pertinent labs within the past 24 hours have been reviewed.    Significant Imaging: I have reviewed all pertinent imaging results/findings within the past 24 hours.

## 2025-06-18 NOTE — H&P
[facility] Medicine  History & Physical    Patient Name: Maikel Deleon  MRN: [id number]  Patient Class: IP- Inpatient  Admission Date: 6/17/2025  Attending Physician: Agustín Pena MD   Primary Care Provider: Elaina Primary Doctor         Patient information was obtained from patient and ER records.     Subjective:     Principal Problem:Lupus nephritis    Chief Complaint: No chief complaint on file.       HPI: 25 year old man with lupus nephritis (bx in October 2024) followed by [facility] transferred from [facility] for rheumatology evaluation. He initially presented for acute anemia (Hg 5.1) incidentally found on routine labs and was referred to the [facility] ED. He received 2 units pRBC at the OS for his acute anemia and Hg has remained stable. No episodes of bleeding reported. Initial Cr 4.4 on admission with improvement once lupus meds were restarted. He reports that he stopped taking his lupus medications in March because he was not used to taking medications. He is on mycophenolate, Plaquenil, prednisone and Voclosporin. He started taking his meds again in April and stopped on June 5th after an episode of vomiting. He was transferred to [facility] for rheumatology evaluation for lupus nephritis.    Past Medical History:   Diagnosis Date    Marijuana use     Systemic lupus erythematosus, organ or system involvement unspecified        Past Surgical History:   Procedure Laterality Date    NO PAST SURGERIES         Review of patient's allergies indicates:   Allergen Reactions    Sulfa (sulfonamide antibiotics)     Sulfamethoxazole-trimethoprim Other (See Comments)       Current Facility-Administered Medications on File Prior to Encounter   Medication    [DISCONTINUED] 0.9%  NaCl infusion (for blood administration)    [DISCONTINUED] 0.9%  NaCl infusion (for blood administration)    [DISCONTINUED] acetaminophen suppository 650 mg    [DISCONTINUED] acetaminophen tablet 650 mg     [DISCONTINUED] albuterol-ipratropium 2.5 mg-0.5 mg/3 mL nebulizer solution 3 mL    [DISCONTINUED] calcium carbonate 200 mg calcium (500 mg) chewable tablet 500 mg    [DISCONTINUED] calcium gluconate 1 g in D5W 50 mL IVPB (MB+)    [DISCONTINUED] dextrose 50% injection 12.5 g    [DISCONTINUED] dextrose 50% injection 25 g    [DISCONTINUED] ferrous sulfate tablet 1 each    [DISCONTINUED] glucagon (human recombinant) injection 1 mg    [DISCONTINUED] glucose chewable tablet 16 g    [DISCONTINUED] glucose chewable tablet 24 g    [DISCONTINUED] HYDROcodone-acetaminophen 5-325 mg per tablet 1 tablet    [DISCONTINUED] hydroxychloroquine tablet 400 mg    [DISCONTINUED] melatonin tablet 6 mg    [DISCONTINUED] morphine injection 2 mg    [DISCONTINUED] mycophenolate tablet 1,500 mg    [DISCONTINUED] naloxone 0.4 mg/mL injection 0.02 mg    [DISCONTINUED] ondansetron injection 4 mg    [DISCONTINUED] pantoprazole EC tablet 40 mg    [DISCONTINUED] predniSONE tablet 15 mg    [DISCONTINUED] promethazine tablet 25 mg    [DISCONTINUED] senna-docusate 8.6-50 mg per tablet 1 tablet    [DISCONTINUED] sodium bicarbonate 75 mEq in 0.45% NaCl 1,075 mL infusion    [DISCONTINUED] sodium bicarbonate tablet 1,300 mg    [DISCONTINUED] sodium chloride 0.9% flush 10 mL    [DISCONTINUED] sodium zirconium cyclosilicate packet 10 g    [DISCONTINUED] voclosporin Cap 23.7 mg     Current Outpatient Medications on File Prior to Encounter   Medication Sig    calcium carbonate (TUMS) 200 mg calcium (500 mg) chewable tablet Take 500 mg by mouth.    diclofenac sodium (VOLTAREN) 1 % Gel Apply 2 g topically 4 (four) times daily as needed.    ergocalciferol (ERGOCALCIFEROL) 50,000 unit Cap Take 50,000 Units by mouth every 7 days.    ferrous sulfate (FEOSOL) 325 mg (65 mg iron) Tab tablet Take 1 tablet every day by oral route for 90 days.    furosemide (LASIX) 40 MG tablet Take 1 tablet (40 mg total) by mouth once daily.    hydroxychloroquine (PLAQUENIL) 200 mg  tablet Take 200 mg by mouth. for 90 days    lisinopriL (PRINIVIL,ZESTRIL) 5 MG tablet Take 5 mg by mouth.    mycophenolate (CELLCEPT) 500 mg Tab Take 1 tablet (500 mg total) by mouth 2 (two) times daily.    ondansetron (ZOFRAN) 4 MG tablet Take 4 mg by mouth.    pantoprazole (PROTONIX) 40 MG tablet Take 1 tablet (40 mg total) by mouth once daily.    predniSONE (DELTASONE) 20 MG tablet Take 1 tablet every day by oral route for 90 days.    sodium bicarbonate 650 MG tablet Take 2 tablets (1,300 mg total) by mouth 3 (three) times daily.    voclosporin 7.9 mg Cap Take 23.7 mg by mouth.     Family History       Problem Relation (Age of Onset)    Diabetes Brother    Heart failure Brother    Hypertension Father    No Known Problems Mother    Sickle cell trait Sister          Tobacco Use    Smoking status: Former     Types: Cigarettes    Smokeless tobacco: Former   Vaping Use    Vaping status: Never Used   Substance and Sexual Activity    Alcohol use: Not Currently    Drug use: Yes     Types: Marijuana    Sexual activity: Not on file     Review of Systems  Objective:     Vital Signs (Most Recent):  Temp: 98 °F (36.7 °C) (06/18/25 0445)  Pulse: 69 (06/18/25 0445)  Resp: 18 (06/18/25 0445)  BP: 133/74 (06/18/25 0445)  SpO2: 100 % (06/18/25 0445) Vital Signs (24h Range):  Temp:  [97.6 °F (36.4 °C)-98.3 °F (36.8 °C)] 98 °F (36.7 °C)  Pulse:  [] 69  Resp:  [17-18] 18  SpO2:  [98 %-100 %] 100 %  BP: (128-145)/(67-86) 133/74     Weight: 78.9 kg (174 lb)  Body mass index is 25.7 kg/m².     Physical Exam  Constitutional:       General: He is not in acute distress.     Appearance: He is normal weight.   HENT:      Head: Normocephalic and atraumatic.   Eyes:      Extraocular Movements: Extraocular movements intact.   Cardiovascular:      Rate and Rhythm: Normal rate and regular rhythm.   Pulmonary:      Effort: Pulmonary effort is normal. No respiratory distress.      Breath sounds: Normal breath sounds. No wheezing.   Abdominal:       Palpations: Abdomen is soft.   Musculoskeletal:      Right lower leg: Edema present.      Left lower leg: Edema present.      Comments: 1+ edema in BLE   Skin:     General: Skin is warm and dry.   Neurological:      Mental Status: He is alert. Mental status is at baseline.   Psychiatric:         Mood and Affect: Mood normal.         Behavior: Behavior normal.                Significant Labs: All pertinent labs within the past 24 hours have been reviewed.    Significant Imaging: I have reviewed all pertinent imaging results/findings within the past 24 hours.  Assessment/Plan:     Assessment & Plan  Lupus nephritis  Patient with known lupus nephritis admitted with AMBER that is resolving now on lupus medications.    Plan  -continue hydroxychloroquine, MMF, prednisone, voclosporin   -follow up rheumatology recommendations  -monitor renal functon with daily BMP  -renal ultrasound normal        Hyperkalemia  Hyperkalemia is likely due to AMBER.The patients most recent potassium results are listed below.  Recent Labs     06/16/25  0451 06/16/25  0703 06/17/25  0453   K 4.5 3.8 3.7     Plan  - Monitor for arrhythmias with EKG and/or continuous telemetry.   - Treat the hyperkalemia with Potassium Binders.   - Monitor potassium: Daily  - The patient's hyperkalemia is stable          Acute kidney injury superimposed on chronic kidney disease  AMBER is likely due to lupus nephritis due medication noncompliance. Baseline creatinine is 1.4. Most recent creatinine and eGFR are listed below.  Recent Labs     06/16/25  0451 06/16/25  0703 06/17/25  0453   CREATININE 3.1* 3.1* 2.4*   EGFRNORACEVR 27* 27* 37*      Plan  - AMBER is improving  - Avoid nephrotoxins and renally dose meds for GFR listed above  - Monitor urine output, serial BMP, and adjust therapy as needed  - Continue lupus medications  - Daily BMP  - Renal US normal  Anemia due to chronic kidney disease  Anemia is likely due to chronic disease due to SLE. Most recent  hemoglobin and hematocrit are listed below.  Recent Labs     06/15/25  2013 06/16/25  0451 06/17/25  0453   HGB 8.3* 8.6* 7.4*   HCT 23.2* 24.7* 21.2*     Plan  - Monitor serial CBC: Daily  - Transfuse PRBC if patient becomes hemodynamically unstable, symptomatic or H/H drops below 7/21.  - Patient has received 2 units of PRBCs on 6/13/25  - Patient's anemia is currently stable    VTE Risk Mitigation (From admission, onward)           Ordered     enoxaparin injection 40 mg  Daily         06/18/25 0052     IP VTE HIGH RISK PATIENT  Once         06/18/25 0052     Place sequential compression device  Until discontinued         06/18/25 0052                                    Esther Simmons DO  Department of Hospital Medicine  [facility]

## 2025-06-18 NOTE — ASSESSMENT & PLAN NOTE
AMBER is likely due to lupus nephritis due medication noncompliance. Baseline creatinine is 1.4. Most recent creatinine and eGFR are listed below.  Recent Labs     06/16/25  0451 06/16/25  0703 06/17/25  0453   CREATININE 3.1* 3.1* 2.4*   EGFRNORACEVR 27* 27* 37*      Plan  - AMBER is improving  - Avoid nephrotoxins and renally dose meds for GFR listed above  - Monitor urine output, serial BMP, and adjust therapy as needed  - Continue lupus medications  - Daily BMP  - Renal US normal

## 2025-06-18 NOTE — ASSESSMENT & PLAN NOTE
Home regimen:   - Cellcept 1500mg BID  - PDN 15 mg/d  -  mg/d  - Voclosporin 23.7 BID    Serologies/labs:  PERRY+ 1:2561, speckled, + SSA (5.47), +Anti-sm Ab (6.84), + Anti-sm/RNP (6.21)  dsDNA negative  Protein/Cr ratio 6000 mg   ANCA, SCL and RNA PolIII <20 neg  C3 (41) and C4 (8) - low  APS labs neg    Kidney bx 10/2024:   1) membranous glomerulonephritis.   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   3)  tubular basement membrane deposits (see comment).      Assessment:   Maikel Deleon is a 25 y.o. male with SLE c/b biopsy-proven Class III/V lupus nephritis (October 2024) who was admitted to [facility] on 6/12 at the request of his rheumatologist for abnormal outpatient labs showing worsening renal function and anemia. He reports non-compliance with medications early June d/t N/V, which he attributed to the meds. SLEDAI 10 (hematuria, pyuria, low complements; UPCR pending) - consistent with lupus flare; likely d/t medication non-compliance.    Recommendations:  - IV solumedrol 500 mg q24H x 3 doses  - Check SWAPNA and UPCR  - Continue home meds: Cellcept 1500mg BID,  mg/d, and Voclosporin 23.7 BID  - Nephrology consult, appreciate assistance

## 2025-06-19 PROBLEM — R80.9 NEPHROTIC RANGE PROTEINURIA: Status: ACTIVE | Noted: 2025-06-19

## 2025-06-19 LAB
ABO + RH BLD: NORMAL
ABSOLUTE EOSINOPHIL (OHS): 0 K/UL
ABSOLUTE MONOCYTE (OHS): 0.18 K/UL (ref 0.3–1)
ABSOLUTE NEUTROPHIL COUNT (OHS): 4.58 K/UL (ref 1.8–7.7)
ANION GAP (OHS): 8 MMOL/L (ref 8–16)
BASOPHILS # BLD AUTO: 0 K/UL
BASOPHILS NFR BLD AUTO: 0 %
BLD PROD TYP BPU: NORMAL
BLOOD UNIT EXPIRATION DATE: NORMAL
BLOOD UNIT TYPE CODE: 5100
BUN SERPL-MCNC: 60 MG/DL (ref 6–20)
CALCIUM SERPL-MCNC: 7.2 MG/DL (ref 8.7–10.5)
CHLORIDE SERPL-SCNC: 108 MMOL/L (ref 95–110)
CO2 SERPL-SCNC: 20 MMOL/L (ref 23–29)
CREAT SERPL-MCNC: 1.6 MG/DL (ref 0.5–1.4)
CROSSMATCH INTERPRETATION: NORMAL
DIRECT COOMBS (DAT): NORMAL
DISPENSE STATUS: NORMAL
ERYTHROCYTE [DISTWIDTH] IN BLOOD BY AUTOMATED COUNT: 16.6 % (ref 11.5–14.5)
GFR SERPLBLD CREATININE-BSD FMLA CKD-EPI: 60 ML/MIN/1.73/M2
GLUCOSE SERPL-MCNC: 118 MG/DL (ref 70–110)
HCT VFR BLD AUTO: 19.1 % (ref 40–54)
HGB BLD-MCNC: 6.6 GM/DL (ref 14–18)
IMM GRANULOCYTES # BLD AUTO: 0.07 K/UL (ref 0–0.04)
IMM GRANULOCYTES NFR BLD AUTO: 1.3 % (ref 0–0.5)
LYMPHOCYTES # BLD AUTO: 0.61 K/UL (ref 1–4.8)
MAGNESIUM SERPL-MCNC: 2 MG/DL (ref 1.6–2.6)
MCH RBC QN AUTO: 28.8 PG (ref 27–31)
MCHC RBC AUTO-ENTMCNC: 34.6 G/DL (ref 32–36)
MCV RBC AUTO: 83 FL (ref 82–98)
NUCLEATED RBC (/100WBC) (OHS): 0 /100 WBC
PHOSPHATE SERPL-MCNC: 3.8 MG/DL (ref 2.7–4.5)
PLATELET # BLD AUTO: 225 K/UL (ref 150–450)
PMV BLD AUTO: 10.2 FL (ref 9.2–12.9)
POTASSIUM SERPL-SCNC: 4.9 MMOL/L (ref 3.5–5.1)
RBC # BLD AUTO: 2.29 M/UL (ref 4.6–6.2)
RELATIVE EOSINOPHIL (OHS): 0 %
RELATIVE LYMPHOCYTE (OHS): 11.2 % (ref 18–48)
RELATIVE MONOCYTE (OHS): 3.3 % (ref 4–15)
RELATIVE NEUTROPHIL (OHS): 84.2 % (ref 38–73)
SODIUM SERPL-SCNC: 136 MMOL/L (ref 136–145)
UNIT NUMBER: NORMAL
WBC # BLD AUTO: 5.44 K/UL (ref 3.9–12.7)

## 2025-06-19 PROCEDURE — 63600175 PHARM REV CODE 636 W HCPCS

## 2025-06-19 PROCEDURE — P9016 RBC LEUKOCYTES REDUCED: HCPCS

## 2025-06-19 PROCEDURE — 86922 COMPATIBILITY TEST ANTIGLOB: CPT

## 2025-06-19 PROCEDURE — 63600175 PHARM REV CODE 636 W HCPCS: Mod: TB | Performed by: STUDENT IN AN ORGANIZED HEALTH CARE EDUCATION/TRAINING PROGRAM

## 2025-06-19 PROCEDURE — 84100 ASSAY OF PHOSPHORUS: CPT

## 2025-06-19 PROCEDURE — 25000003 PHARM REV CODE 250

## 2025-06-19 PROCEDURE — 11000001 HC ACUTE MED/SURG PRIVATE ROOM

## 2025-06-19 PROCEDURE — 80048 BASIC METABOLIC PNL TOTAL CA: CPT

## 2025-06-19 PROCEDURE — 36430 TRANSFUSION BLD/BLD COMPNT: CPT

## 2025-06-19 PROCEDURE — 86880 COOMBS TEST DIRECT: CPT | Performed by: STUDENT IN AN ORGANIZED HEALTH CARE EDUCATION/TRAINING PROGRAM

## 2025-06-19 PROCEDURE — 85025 COMPLETE CBC W/AUTO DIFF WBC: CPT

## 2025-06-19 PROCEDURE — 83735 ASSAY OF MAGNESIUM: CPT

## 2025-06-19 PROCEDURE — 36415 COLL VENOUS BLD VENIPUNCTURE: CPT

## 2025-06-19 PROCEDURE — 99232 SBSQ HOSP IP/OBS MODERATE 35: CPT | Mod: ,,, | Performed by: INTERNAL MEDICINE

## 2025-06-19 PROCEDURE — 99223 1ST HOSP IP/OBS HIGH 75: CPT | Mod: ,,, | Performed by: INTERNAL MEDICINE

## 2025-06-19 PROCEDURE — 30233N1 TRANSFUSION OF NONAUTOLOGOUS RED BLOOD CELLS INTO PERIPHERAL VEIN, PERCUTANEOUS APPROACH: ICD-10-PCS | Performed by: STUDENT IN AN ORGANIZED HEALTH CARE EDUCATION/TRAINING PROGRAM

## 2025-06-19 PROCEDURE — 25000003 PHARM REV CODE 250: Performed by: STUDENT IN AN ORGANIZED HEALTH CARE EDUCATION/TRAINING PROGRAM

## 2025-06-19 RX ORDER — HYDROCODONE BITARTRATE AND ACETAMINOPHEN 500; 5 MG/1; MG/1
TABLET ORAL
Status: DISCONTINUED | OUTPATIENT
Start: 2025-06-19 | End: 2025-06-20 | Stop reason: HOSPADM

## 2025-06-19 RX ADMIN — HYDROXYCHLOROQUINE SULFATE 300 MG: 200 TABLET, FILM COATED ORAL at 08:06

## 2025-06-19 RX ADMIN — MUPIROCIN: 20 OINTMENT TOPICAL at 08:06

## 2025-06-19 RX ADMIN — MYCOPHENOLATE MOFETIL 1500 MG: 250 CAPSULE ORAL at 08:06

## 2025-06-19 RX ADMIN — MUPIROCIN: 20 OINTMENT TOPICAL at 09:06

## 2025-06-19 RX ADMIN — MYCOPHENOLATE MOFETIL 1500 MG: 250 CAPSULE ORAL at 09:06

## 2025-06-19 RX ADMIN — ENOXAPARIN SODIUM 40 MG: 40 INJECTION SUBCUTANEOUS at 04:06

## 2025-06-19 RX ADMIN — METHYLPREDNISOLONE SODIUM SUCCINATE 500 MG: 500 INJECTION INTRAMUSCULAR; INTRAVENOUS at 02:06

## 2025-06-19 RX ADMIN — PANTOPRAZOLE SODIUM 40 MG: 40 TABLET, DELAYED RELEASE ORAL at 08:06

## 2025-06-19 RX ADMIN — FERROUS SULFATE TAB EC 325 MG (65 MG FE EQUIVALENT) 1 EACH: 325 (65 FE) TABLET DELAYED RESPONSE at 08:06

## 2025-06-19 NOTE — SUBJECTIVE & OBJECTIVE
Interval History: No acute events overnight. Pt's creatinine continues to improve. Restarted voclosporin due to improved GFR. Will continue to follow up rheum and nephro recs. Recommended compression stockings for his SHAKILA.     Review of Systems  Objective:     Vital Signs (Most Recent):  Temp: 97.7 °F (36.5 °C) (06/19/25 0944)  Pulse: 78 (06/19/25 0944)  Resp: 14 (06/19/25 0944)  BP: 132/79 (06/19/25 0944)  SpO2: 100 % (06/19/25 0944) Vital Signs (24h Range):  Temp:  [97.6 °F (36.4 °C)-98.3 °F (36.8 °C)] 97.7 °F (36.5 °C)  Pulse:  [65-84] 78  Resp:  [14-18] 14  SpO2:  [100 %] 100 %  BP: (121-146)/(65-94) 132/79     Weight: 78.9 kg (174 lb)  Body mass index is 25.7 kg/m².  No intake or output data in the 24 hours ending 06/19/25 1135      Physical Exam  Constitutional:       General: He is not in acute distress.     Appearance: He is normal weight.   HENT:      Head: Normocephalic and atraumatic.   Eyes:      Extraocular Movements: Extraocular movements intact.   Cardiovascular:      Rate and Rhythm: Normal rate and regular rhythm.   Pulmonary:      Effort: Pulmonary effort is normal. No respiratory distress.      Breath sounds: Normal breath sounds. No wheezing.   Abdominal:      Palpations: Abdomen is soft.   Musculoskeletal:      Right lower leg: Edema present.      Left lower leg: Edema present.      Comments: 1+ edema in BLE   Skin:     General: Skin is warm and dry.   Neurological:      Mental Status: He is alert. Mental status is at baseline.   Psychiatric:         Mood and Affect: Mood normal.         Behavior: Behavior normal.               Significant Labs: All pertinent labs within the past 24 hours have been reviewed.  Recent Lab Results         06/19/25  0515   06/18/25  1607        Anion Gap 8         Baso # 0.00         Basophil % 0.0         BUN 60         Calcium 7.2         Chloride 108         CO2 20         Creatinine 1.6         Urine Creatinine   83.0       Direct Alyson (SWAPNA) NEG         eGFR  60  Comment: Estimated GFR calculated using the CKD-EPI creatinine (2021) equation.         Eos # 0.00         Eos % 0.0         Glucose 118         Gran # (ANC) 4.58         Hematocrit 19.1         Hemoglobin 6.6         Immature Grans (Abs) 0.07  Comment: Mild elevation in immature granulocytes is non specific and can be seen in a variety of conditions including stress response, acute inflammation, trauma and pregnancy. Correlation with other laboratory and clinical findings is essential.         Immature Granulocytes 1.3         Lymph # 0.61         Lymph % 11.2         Magnesium  2.0         MCH 28.8         MCHC 34.6         MCV 83         Mono # 0.18         Mono % 3.3         MPV 10.2         Neut % 84.2         nRBC 0         Phosphorus Level 3.8         Platelet Count 225         Potassium 4.9  Comment: *Result may be interfered by visible hemolysis         Urine Protein/Creatinine Ratio   6.81       Urine Protein   565       RBC 2.29         RDW 16.6         Sodium 136         WBC 5.44                 Significant Imaging: I have reviewed all pertinent imaging results/findings within the past 24 hours.

## 2025-06-19 NOTE — ASSESSMENT & PLAN NOTE
AMBER is likely due to lupus nephritis due medication noncompliance. Baseline creatinine is 1.4. Most recent creatinine and eGFR are listed below.  Recent Labs     06/17/25  0453 06/18/25  0626 06/19/25  0515   CREATININE 2.4* 1.7* 1.6*   EGFRNORACEVR 37* 56* 60*      Plan  - AMBER is improving  - Avoid nephrotoxins and renally dose meds for GFR listed above  - Monitor urine output, serial BMP, and adjust therapy as needed  - Continue lupus medications  - Daily BMP

## 2025-06-19 NOTE — ASSESSMENT & PLAN NOTE
Anemia is likely due to chronic disease due to SLE. Most recent hemoglobin and hematocrit are listed below.  Recent Labs     06/18/25  0626 06/18/25  0813 06/19/25  0515   HGB 6.6* 7.3* 6.6*   HCT 18.9* 20.8* 19.1*     Plan  - Monitor serial CBC: Daily  - Transfuse PRBC if patient becomes hemodynamically unstable, symptomatic or H/H drops below 7/21.  - Patient has received 2 units of PRBCs on 6/13/25  - Patient's anemia is currently stable  - received additional 1 unit of pRBC's on 6/19

## 2025-06-19 NOTE — PROGRESS NOTES
[facility] - Med Surg  Rheumatology  Progress Note    Patient Name: Maikel Deleon  MRN: [id number]  Admission Date: 6/17/2025  Hospital Length of Stay: 2 days  Code Status: Full Code   Attending Provider: Agustín Pena MD  Primary Care Physician: Elaina, Primary Doctor  Principal Problem: Lupus nephritis    Subjective:     HPI: Maikel Deleon is a  25 y.o. male with SLE c/b biopsy-proven Class III/V lupus nephritis (October 2024) who was admitted to [place] on 6/12 at the request of his rheumatologist for abnormal outpatient labs showing worsening renal function and anemia. For management of SLE, he is currently prescribed plaquenil 300mg, Cellcept 1500mg BID, prednisone 15mg daily, and voclosporin 23.7mg. He has had intermitted compliance with meds due to concerns for side effects, specifically nausea/vomiting, a chronic issue for him. He reports he had not taken any of his meds since June 5th d/t N/V. He was found to have a Cr of 4.4 from baseline of 1.3, hyperkalemia to 5.7, and hgb of 5.2. He was transfused 3 total units prbc with refractory anemia. Nephrology consulted, and thought AMBER to be due to prerenal etiology/IVVD due to severe anemia. His prednisone, MMF, and cellcept were resumed. Given the recurrent anemia and hypocomplementemia, he was transferred to [facility] for further evaluation by rheumatology.     On arrival to [facility], he is HDS on RA. CBC notable for hgb 7.3, WBC and plts WNL. Cr 1.7 and is improving. Hyperkalemia resolved. Albumin 1.0. On 6/15, C3 low at 41, C4 low at 8. CRP nml at 1.48. Ds-DNA was again negative on 6/15.    Patient of Dr. Correia/Laury (LOV 6/12/25).     Rheum History:  - Normal state of health until summer 2024, when he developed SOB, acute renal insufficiency and severe peripheral edema  October 19, 2024 when he presented to the [facility] in [place]  - Labs/Serologies: cr 2.7, UPCR 6gm, PERRY 1: 2560, positive SSA, positive anti Flores, positive RNP, negative  double-stranded DNA, low C3, low C4  - Kidney bx that admission confirmed class 3/5 lupus nephritis  - He received 1 g of IV Solu-Medrol for 3 days and then transitioned to prednisone 60 mg daily. CellCept 500 mg BID and Plaquenil 200 mg day were also added.   - December 2024, nephrology decreased prednisone to 20 mg/d, Cellcept increased to 1500 mg BID and remained on  mg/d. After this visit, he had to transition care to [facility] due to insurance reasons  - Established with [facility] Feb 2025, who noted compliance with medications. 10mg lisinopril added for additional management of proteinuria, however patient did not start.   - Established with rheumatology in March 2025, who decrease pred to 15mg and started voclosporin in addition to continued Cellcept and plaquenil.   - Saw nephrology in May, who noted that he had been off his meds for several weeks due to n/v that he presumed were side effects of the medications. He was encouraged to continue the medications. 10g proteinuria at that visit.   - Again saw rheumatology 6/12. He noted some LE edema but otherwise no symptom flares. They recommended c/w Cellcept 1500mg BID, PDN 15 mg/d,  mg/d and voclosporin 23.7 BID. Routine labs ordered, which ultimately led to admission.       Rheum ROS:  No fevers or chills  + unintentional weight loss - 30-40 lbs   No rash   No malar rash or photosensitivity  No oral ulcers    No genital ulcers  No dry eyes and dry mouth   No conjunctivitis or uveitis or scleritis or episcleritis  No SOB  No dysphagia  No raynaud's   No digital ulcers   No bloody diarrhea  No focal weakness  No joint pain  + peripheral swelling      History  Social: denies alcohol, tobacco, drug use. Former marijuana use, stopped last summer  Family: denies family h/o autoimmune disease; father is adopted - unknown family history      Interval History: No acute complaints. Peripheral swelling unchanged. Day 2 of steroids.    Current  Facility-Administered Medications   Medication Frequency    0.9%  NaCl infusion (for blood administration) Q24H PRN    dextrose 50% injection 12.5 g PRN    dextrose 50% injection 25 g PRN    enoxaparin injection 40 mg Daily    ferrous sulfate tablet 1 each Daily    glucagon (human recombinant) injection 1 mg PRN    glucose chewable tablet 16 g PRN    glucose chewable tablet 24 g PRN    hydrOXYchloroQUINE split tablet 300 mg Daily    methylPREDNISolone sodium succinate (SOLU-MEDROL) 500 mg in 0.9% NaCl 100 mL IVPB Q24H    mupirocin 2 % ointment BID    mycophenolate capsule 1,500 mg BID    naloxone 0.4 mg/mL injection 0.02 mg PRN    pantoprazole EC tablet 40 mg Daily    sodium chloride 0.9% flush 10 mL Q12H PRN    Voclosporin Cap 23.7 mg [PATIENT SUPPLIED] - SEE INSTRUCTION BID     Objective:     Vital Signs (Most Recent):  Temp: 97.7 °F (36.5 °C) (06/19/25 0944)  Pulse: 78 (06/19/25 0944)  Resp: 14 (06/19/25 0944)  BP: 132/79 (06/19/25 0944)  SpO2: 100 % (06/19/25 0944) Vital Signs (24h Range):  Temp:  [97.6 °F (36.4 °C)-98.3 °F (36.8 °C)] 97.7 °F (36.5 °C)  Pulse:  [65-84] 78  Resp:  [14-18] 14  SpO2:  [100 %] 100 %  BP: (121-146)/(65-94) 132/79     Weight: 78.9 kg (174 lb) (06/17/25 2345)  Body mass index is 25.7 kg/m².  Body surface area is 1.96 meters squared.    No intake or output data in the 24 hours ending 06/19/25 1110     Physical Exam   Constitutional: He is oriented to person, place, and time. No distress. He does not appear ill.   HENT:   Head: Normocephalic and atraumatic.   Mouth/Throat: Mucous membranes are moist.   Eyes: Right eye exhibits no discharge. Left eye exhibits no discharge. No scleral icterus.   Cardiovascular: Normal rate and regular rhythm.   No murmur heard.  Pulmonary/Chest: No respiratory distress. He has no wheezes. He has no rales.   Abdominal: There is no abdominal tenderness.   Musculoskeletal:         General: No tenderness.      Right lower leg: Edema present.      Left lower  leg: Edema present.      Comments: Swelling of bilateral elbows to forearm + bilateral below knee swelling    Neurological: He is oriented to person, place, and time.   Skin: No lesion and no rash noted.        Significant Labs:  All pertinent lab results from the last 24 hours have been reviewed.    Significant Imaging:  Imaging results within the past 24 hours have been reviewed.  Assessment/Plan:     *SLE  * Lupus nephritis  Home regimen:   - Cellcept 1500mg BID  - PDN 15 mg/d  -  mg/d  - Voclosporin 23.7 BID    Serologies/labs:  PERRY+ 1:2561, speckled, + SSA (5.47), +Anti-sm Ab (6.84), + Anti-sm/RNP (6.21)  dsDNA negative  Protein/Cr ratio 6000 mg   ANCA, SCL and RNA PolIII <20 neg  C3 (41) and C4 (8) - low  APS labs neg  UPCR 6.81    Kidney bx 10/2024:   1) membranous glomerulonephritis.   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   3)  tubular basement membrane deposits (see comment).      Assessment:   Maikel Deleon is a 25 y.o. male with SLE c/b biopsy-proven Class III/V lupus nephritis (October 2024) who was admitted to [place] on 6/12 at the request of his rheumatologist for abnormal outpatient labs showing worsening renal function and anemia. He reports non-compliance with medications early June d/t N/V, which he attributed to the meds. SLEDAI 10 (hematuria, pyuria, low complements; UPCR pending) - consistent with lupus flare; likely d/t medication non-compliance.    Recommendations:  - IV solumedrol 500 mg q24H x 3 doses (6/19 - ), then transition to prednisone 40 mg/d with taper  - Continue home meds: Cellcept 1500mg BID,  mg/d, and Voclosporin 23.7 BID  - Will reach out to his Rheumatologist at [facility] to arrange follow up    Assessment and plan discussed with attending physician. Please see attending attestation for official recommendations.        Corine Gonzalez MD  Rheumatology  [facility] - Med Surg    I have personally taken the history and  examined the patient and concur with the resident's note as above.  He is feeling some better.  His edema has not changed.  He is tolerating the medication.  He will have 1 more day of IV steroids.  We will continue him on his other medications that he was supposed to be taking at home.

## 2025-06-19 NOTE — CONSULTS
"  [facility] - Med Surg  Adult Nutrition  Consult Note    SUMMARY     Recommendations    Recommendation/Intervention:   1. Continue renal non-dialysis diet as tolerated     - please continue to document PO % intake via flowsheets       2. Encourage good intake    3. RD to monitor weight, labs, intake, tolerance    Goals:   1. % nutritional needs met with diet during admission     2. Maintain weight during admission    3. Display s/s of wound healing during admission    Nutrition Goal Status: new  Communication of RD Recs:  (POC)    Nutrition Discharge Planning     Nutrition Discharge Planning: Therapeutic diet (comments)  Therapeutic diet (comments): Renal non-dialysis    Reason for Assessment    Reason For Assessment: consult (low albumin)  Diagnosis:  (lupus nephritis)  General Information Comments: Pt admitted for lupus nephritis, per  Note " pt initially presented for acute anemia". PMHx: marijuana use, systemic lupus erythematosus organ or system involvement unspecified.  No PSHx. No wounds noted. Edema noted left and right ankle 2+, left and right foot 2+. Pt alone at bedside. Pt follows a 20-30 hr fast every 2-3 days, according to pt since he was 8 years old. Pt works out 3-4 days for about 2-3 hours. Pt no longer experiencing N/V. No significant wt loss noted. PO intake 100%. Pt expresses concern if he drops or gains wt from his desirable range (130-150 lb)    Nutrition/Diet History    Nutrition Intake History: Pt fasts for 20-30 hrs every 2-3 days.  Spiritual, Cultural Beliefs, [employer]al Practices, Values that Affect Care: no  Food Allergies: NKFA  Factors Affecting Nutritional Intake: None identified at this time  Nutrition-related SDOH: None Identified    Anthropometrics    Height: 5' 9" (175.3 cm)  Height (inches): 69 in  Weight: 78.9 kg (174 lb)  Weight (lb): 174 lb  Weight Method: Bed Scale  Ideal Body Weight (IBW), Male: 160 lb  % Ideal Body Weight, Male (lb): 108.75 %  BMI (Calculated): " See telephone encounter dated 03/01/22.   25.7  BMI Grade: 25 - 29.9 - overweight  Usual Body Weight (UBW), k kg  % Usual Body Weight: 116.31       Lab/Procedures/Meds    Pertinent Labs Reviewed: reviewed  Pertinent Labs Comments: H/H 6.6/19.1 L, K 5.7 L, BUN 60 H, Cr 1.6 H, GFR 60 L, Glu 118 H, Ca 7.2 L, Protein total 4.2 L, Albumin 1.0 L, ALT 6 L  Pertinent Medications Reviewed: reviewed  Pertinent Medications Comments: Enoxaparin, ferrous sulfate, hydroxychloroquine, methylprednisolone sodium succinate, mupirocin, mycophenolate, pantoprazole, voclosporin    Estimated/Assessed Needs    Weight Used For Calorie Calculations: 78.9 kg (173 lb 15.1 oz)  Energy Calorie Requirements (kcal): 3843-3591 kcal/kg (20-25 kcal/kg)  Energy Need Method: Kcal/kg  Protein Requirements: 63-79 g/kg (0.8-1.0 g/pro)  Weight Used For Protein Calculations: 78.9 kg (173 lb 15.1 oz)        RDA Method (mL): 1578  CHO Requirement: 197-247      Nutrition Prescription Ordered    Current Diet Order: Renal non dialysis    Evaluation of Received Nutrient/Fluid Intake    I/O: None  Energy Calories Required: meeting needs  Protein Required: meeting needs  Fluid Required:  (per MD)  Comments: LBM   Tolerance: tolerating  % Intake of Estimated Energy Needs: 75 - 100 %  % Meal Intake: 75 - 100 %    PES Statement  No nutrition diagnosis at this time related to   as evidenced by    Status:      Nutrition Risk    Level of Risk/Frequency of Follow-up: moderate - high (1-2/wk)       Monitor and Evaluation    Monitor and Evaluation: Energy intake, Food and beverage intake, Protein intake, Carbohydrate intake, Diet order, Weight, Beliefs and attitudes, Electrolyte and renal panel, Gastrointestinal profile, Glucose/endocrine profile, Lipid profile, Inflammatory profile, Nutrition focused physical findings, Skin       Nutrition Follow-Up    RD Follow-up?: Yes

## 2025-06-19 NOTE — HOSPITAL COURSE
25 year old man with lupus nephritis (bx in October 2024) who follows at [facility] Rheumatology with Dr. Correia, was transferred from [place] for rheumatology evaluation due to a lupus flare due to medication non adherence due to n/v with an AMBER as well as anemia. On admission to [place] he had a Hgb 5.2, Cr 4.4 from baseline 1.3 and was given 3 units PRBC and nephro was consulted as well. His anemia was recurrent and he had low complement and was transferred for rheum evaluation. Rheumatology and nephrology were consulted. Rheum recommended a nephro consult, SWAPNA and UPCR, as well as 3 doses of IV solumedrol 500mg Q24H. Nephrology assessed the patient's case and recommended outpatient follow up and to hold his ACE/ARB in setting of AMBER in addition to avoiding aggressive diuresis. Rheumatology recommended continuing his home immunosuppressive regimen in addition to a steroid taper. With his high dose steroid taper in mind he was started on atovaquone prophylaxis.

## 2025-06-19 NOTE — ASSESSMENT & PLAN NOTE
Home regimen:   - Cellcept 1500mg BID  - PDN 15 mg/d  -  mg/d  - Voclosporin 23.7 BID    Serologies/labs:  PERRY+ 1:2561, speckled, + SSA (5.47), +Anti-sm Ab (6.84), + Anti-sm/RNP (6.21)  dsDNA negative  Protein/Cr ratio 6000 mg   ANCA, SCL and RNA PolIII <20 neg  C3 (41) and C4 (8) - low  APS labs neg  UPCR 6.81    Kidney bx 10/2024:   1) membranous glomerulonephritis.   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   3)  tubular basement membrane deposits (see comment).      Assessment:   Maikel Deleon is a 25 y.o. male with SLE c/b biopsy-proven Class III/V lupus nephritis (October 2024) who was admitted to [place] on 6/12 at the request of his rheumatologist for abnormal outpatient labs showing worsening renal function and anemia. He reports non-compliance with medications early June d/t N/V, which he attributed to the meds. SLEDAI 10 (hematuria, pyuria, low complements; UPCR pending) - consistent with lupus flare; likely d/t medication non-compliance.    Recommendations:  - IV solumedrol 500 mg q24H x 3 doses  - Continue home meds: Cellcept 1500mg BID,  mg/d, and Voclosporin 23.7 BID  - Nephrology consult, appreciate assistance

## 2025-06-19 NOTE — PHARMACY MED REC
"Admission Medication History     The home medication history was taken by Deniz Queen.    You may go to "Admission" then "Reconcile Home Medications" tabs to review and/or act upon these items.     The home medication list has been updated by the Pharmacy department.   Please read ALL comments highlighted in yellow.   Please address this information as you see fit.    Feel free to contact us if you have any questions or require assistance.      The medications listed below were removed from the home medication list. Please reorder if appropriate:  Patient reports no longer taking the following medication(s):  Calcium carbonate (TUMS)      Medications listed below were obtained from: Patient/family  PTA Medications   Medication Sig    diclofenac sodium (VOLTAREN) 1 % Gel Apply 2 g topically 4 (four) times daily as needed.    ergocalciferol (ERGOCALCIFEROL) 50,000 unit Cap Take 50,000 Units by mouth every 7 days.    ferrous sulfate (FEOSOL) 325 mg (65 mg iron) Tab tablet Take 1 tablet every day by oral route for 90 days.    furosemide (LASIX) 40 MG tablet Take 1 tablet (40 mg total) by mouth once daily.    hydroxychloroquine (PLAQUENIL) 200 mg tablet Take 200 mg by mouth. for 90 days    lisinopriL (PRINIVIL,ZESTRIL) 5 MG tablet Take 5 mg by mouth.    mycophenolate (CELLCEPT) 500 mg Tab Take 1 tablet (500 mg total) by mouth 2 (two) times daily.    ondansetron (ZOFRAN) 4 MG tablet Take 4 mg by mouth.    pantoprazole (PROTONIX) 40 MG tablet Take 1 tablet (40 mg total) by mouth once daily.    predniSONE (DELTASONE) 20 MG tablet Take 1 tablet every day by oral route for 90 days.    sodium bicarbonate 650 MG tablet Take 2 tablets (1,300 mg total) by mouth 3 (three) times daily.    voclosporin 7.9 mg Cap Take 23.7 mg by mouth.       Potential issues to be addressed PRIOR TO DISCHARGE      Deniz Queen  EXT [id number]                         "

## 2025-06-19 NOTE — CONSULTS
"[facility] - Medical / Clinical  Nephrology  CONSULT Note      Patient Name: Maikel Deleon   MRN: [id number]   Current Provider: Agustín Pena MD  Primary Care Provider: Elaina Primary Doctor   Admission Date: 6/17/2025   Hospital Day: 2  Bed: 622/622 A  Principal Problem: Lupus nephritis       SUBJECTIVE    Maikel Deleon is a 25 y.o. 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 Correia, was transferred from [place] for rheumatology evaluation.    He was admitted with lupus flare due to medication non compliance because of n/v (stopped on June 5), also has AMBER (Cr 4) and anemia (Hb of 5.2). He was started on tarted on solumedrol 500 Q24 3x and was given 3 units of bloods.     Nephrology was consulted for AMBER. 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 He is on cellcept, hydroxychloroquine, pred and voclosporin.  His Cr peaked to 4.4 on 6/12 now with medications improved to 1.6 on 6/19.    BL Cr is 1.3 (as per notes). UA showed 3+ protein and 2+ blood. UPCR os 6.81. Hypoalbuminemia and low C3, C4.     He denied discoloration of the urine, hematuria, dysuria, foul smell, frequency and urgency.     Review of Systems: Negative except for as stated in history of present illness    OBJECTIVE     Vitals:  /80 (Patient Position: Lying)   Pulse 65   Temp 97.9 °F (36.6 °C) (Oral)   Resp 18   Ht 5' 9" (1.753 m)   Wt 78.9 kg (174 lb)   SpO2 100%   BMI 25.70 kg/m²    No intake/output data recorded.   Net IO Since Admission: No IO data has been entered for this period [06/19/25 2768]    Physical Examination:  Constitutional: Alert and oriented  HEENT: Atraumatic  Chest:  Chest is clear BL. No Crackles. No wheezes  Cardiovascular:  S1+S2+0. Regular. No murmur  Abdominal: SNT, ND  Extremities: 2+ Bilateral lower extremity edema upto knees    MEDICATIONS & " "LABS         enoxparin  40 mg Subcutaneous Daily    ferrous sulfate  1 tablet Oral Daily    hydroxychloroquine  400 mg Oral Daily    methylPREDNISolone injection (PEDS and ADULTS)  500 mg Intravenous Q24H    mupirocin   Nasal BID    mycophenolate  1,500 mg Oral BID    pantoprazole  40 mg Oral Daily     Current Outpatient Medications   Medication Instructions    diclofenac sodium (VOLTAREN) 2 g, 4 times daily PRN    ergocalciferol (ERGOCALCIFEROL) 50,000 Units, Every 7 days    ferrous sulfate (FEOSOL) 325 mg (65 mg iron) Tab tablet Take 1 tablet every day by oral route for 90 days.    furosemide (LASIX) 40 mg, Oral, Daily    hydroxychloroquine (PLAQUENIL) 200 mg    lisinopriL (PRINIVIL,ZESTRIL) 5 mg    mycophenolate (CELLCEPT) 500 mg, Oral, 2 times daily    ondansetron (ZOFRAN) 4 mg    pantoprazole (PROTONIX) 40 mg, Oral, Daily    predniSONE (DELTASONE) 20 MG tablet Take 1 tablet every day by oral route for 90 days.    sodium bicarbonate 1,300 mg, Oral, 3 times daily    voclosporin 23.7 mg       Relevant Data:  Trend: No results found for: "CYSTATINCSER"  Trend:   Lab Results   Component Value Date    BUN 60 (H) 06/19/2025    BUN 59 (H) 06/18/2025    BUN 73 (H) 06/17/2025     Trend:     No results found for: "HCO3"  Trend:   Vitals:    06/18/25 1704 06/18/25 1956 06/18/25 2340 06/19/25 0438   BP: 127/79 135/72 121/65 130/80   BP Location: Left arm      Patient Position: Sitting  Lying Lying   Pulse: 76 81 84 65   Resp: 16 18 18 18   Temp: 98 °F (36.7 °C) 98.3 °F (36.8 °C) 98.2 °F (36.8 °C) 97.9 °F (36.6 °C)   TempSrc: Oral  Oral Oral   SpO2: 100% 100% 100% 100%   Weight:       Height:           No intake/output data recorded.   Net IO Since Admission: No IO data has been entered for this period [06/19/25 0655]  Trend:   Phosphorus Level   Date Value Ref Range Status   06/12/2025 6.1 (H) 2.5 - 4.7 mg/dL Final   05/12/2025 3.9 2.5 - 4.7 mg/dL Final   02/17/2025 3.5 2.5 - 4.7 mg/dL Final     Trend:   Lab Results "   Component Value Date    HGB 6.6 (L) 06/19/2025    HGB 7.3 (L) 06/18/2025    HGB 6.6 (L) 06/18/2025         Trend:   Calcium   Date Value Ref Range Status   06/19/2025 7.2 (L) 8.7 - 10.5 mg/dL Final   06/18/2025 6.6 (LL) 8.7 - 10.5 mg/dL Final     Comment:     *Critical value notification by sammie  with confirmation of receipt to ARCHIE REIS RN at  Date 6/18/25 Time 7:48   06/17/2025 6.6 (LL) 8.7 - 10.5 mg/dL Final     Albumin   Date Value Ref Range Status   06/17/2025 1.0 (L) 3.5 - 5.2 g/dL Final   06/16/2025 1.0 (L) 3.5 - 5.2 g/dL Final       MEDICAL HISTORY    Past Medical History:  Past Medical History:   Diagnosis Date    Marijuana use     Systemic lupus erythematosus, organ or system involvement unspecified        Past Surgical History:  Past Surgical History:   Procedure Laterality Date    NO PAST SURGERIES         Family History:   Family History   Problem Relation Name Age of Onset    No Known Problems Mother      Hypertension Father      Sickle cell trait Sister      Heart failure Brother      Diabetes Brother        Review of patient's allergies indicates:   Allergen Reactions    Sulfa (sulfonamide antibiotics)     Sulfamethoxazole-trimethoprim Other (See Comments)     Social History[1]         ASSESSMENT AND PLAN:    Maikel Deleon is a 25 y.o. male ,is admitted on 6/17/2025  with past medical history of   Lupus nephritis (Bx in 10/2024). nemo at [facility] Rheumatology with Dr. Correia, was transferred from [place] for rheumatology evaluation    Nephrology was consulted for AMBER. 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 He is on cellcept, hydroxychloroquine, pred and voclosporin.  His Cr peaked to 4.4 on 6/12 now with medications improved to 1.6 on 6/19.    BL Cr is 1.3 (as per notes). UA showed 3+ protein and 2+ blood. UPCR os 6.81. Hypoalbuminemia and low C3, C4.       Impression:  AMBER 1 on CKD   Baseline Creatinine: 1.3 (as per  notes)  Creatinine at time of consult: 1.6 (peak at 4.4 on 6/12)  Tests done: UA 3+ protein and 2+ blood, UPCR 6.81  Etiology of AMBER: Lupus nephritis flare up due to non compliance with the medications Improving with the Steroids.      Recommendations :   - Continue with the home medications cellcept, hydroxychloroquine, pred and voclosporin.  - No further addition at this time  - Follow up with the nephrologist in [place]   - Avoid ACE inhibitor/ARB in setting of AMBER  - High risk of coagulation as the albumin <2.5 - Kindly concider thromboembolic prevention and encourage early mobility.    Edema Management:  - Sodium restriction (2-3g/day)  - Loop diuretics (Lasix) - avoid aggressive diuresis  - Daily weights      Monitor serum chemistries daily, strict intake and output Qshift , daily weights if able.  Renal protective measures: Please adjust medications for reduced clearance  Avoid nephrotoxic medications (NSAID, IV contrast)  Avoid ACEi and ARBs in the setting of AMBER  Maintain MAP > 65  BP goal of <140/90 mm Hg  Transfuse for Hb <7    Thank you for allowing us to participate in the care of this patient.  Plan discussed with attending. Please call with any questions or concerns.           Mir Hu MD.  Clinical Nephrology Fellow, PGY-4  [facility] [address]     ATTENDING PHYSICIAN ATTESTATION  I have personally verified the history and examined the patient. I thoroughly reviewed the demographic, clinical, laboratorial and imaging information available in medical records. I agree with the assessment and recommendations provided by the subspecialty resident who was under my supervision.          [1]   Social History  Socioeconomic History    Marital status: Single   Tobacco Use    Smoking status: Former     Types: Cigarettes    Smokeless tobacco: Former   Vaping Use    Vaping status: Never Used   Substance and Sexual Activity    Alcohol use: Not Currently    Drug use: Yes     Types:  Marijuana     Social Drivers of Health     Financial Resource Strain: Low Risk  (6/18/2025)    Overall Financial Resource Strain (CARDIA)     Difficulty of Paying Living Expenses: Not hard at all   Food Insecurity: No Food Insecurity (6/18/2025)    Hunger Vital Sign     Worried About Running Out of Food in the Last Year: Never true     Ran Out of Food in the Last Year: Never true   Transportation Needs: No Transportation Needs (6/18/2025)    PRAPARE - Transportation     Lack of Transportation (Medical): No     Lack of Transportation (Non-Medical): No   Physical Activity: Inactive (6/18/2025)    Exercise Vital Sign     Days of Exercise per Week: 0 days     Minutes of Exercise per Session: 0 min   Stress: No Stress Concern Present (6/18/2025)    [employer] [place] - Occupational Stress Questionnaire     Feeling of Stress : Not at all   Housing Stability: Low Risk  (6/18/2025)    Housing Stability Vital Sign     Unable to Pay for Housing in the Last Year: No     Homeless in the Last Year: No

## 2025-06-19 NOTE — PLAN OF CARE
Recommendations    Recommendation/Intervention:   1. Continue renal non-dialysis diet as tolerated     - please continue to document PO % intake via flowsheets       2. Encourage good intake    3. RD to monitor weight, labs, intake, tolerance    Goals:   1. % nutritional needs met with diet during admission     2. Maintain weight during admission    3. Display s/s of wound healing during admission    Nutrition Goal Status: new  Communication of RD Recs:  (POC)    Nutrition Discharge Planning     Nutrition Discharge Planning: Therapeutic diet (comments)  Therapeutic diet (comments): Renal non-dialysis

## 2025-06-19 NOTE — SUBJECTIVE & OBJECTIVE
Interval History: No acute complaints. Peripheral swelling unchanged    Current Facility-Administered Medications   Medication Frequency    0.9%  NaCl infusion (for blood administration) Q24H PRN    dextrose 50% injection 12.5 g PRN    dextrose 50% injection 25 g PRN    enoxaparin injection 40 mg Daily    ferrous sulfate tablet 1 each Daily    glucagon (human recombinant) injection 1 mg PRN    glucose chewable tablet 16 g PRN    glucose chewable tablet 24 g PRN    hydrOXYchloroQUINE split tablet 300 mg Daily    methylPREDNISolone sodium succinate (SOLU-MEDROL) 500 mg in 0.9% NaCl 100 mL IVPB Q24H    mupirocin 2 % ointment BID    mycophenolate capsule 1,500 mg BID    naloxone 0.4 mg/mL injection 0.02 mg PRN    pantoprazole EC tablet 40 mg Daily    sodium chloride 0.9% flush 10 mL Q12H PRN    Voclosporin Cap 23.7 mg [PATIENT SUPPLIED] - SEE INSTRUCTION BID     Objective:     Vital Signs (Most Recent):  Temp: 97.7 °F (36.5 °C) (06/19/25 0944)  Pulse: 78 (06/19/25 0944)  Resp: 14 (06/19/25 0944)  BP: 132/79 (06/19/25 0944)  SpO2: 100 % (06/19/25 0944) Vital Signs (24h Range):  Temp:  [97.6 °F (36.4 °C)-98.3 °F (36.8 °C)] 97.7 °F (36.5 °C)  Pulse:  [65-84] 78  Resp:  [14-18] 14  SpO2:  [100 %] 100 %  BP: (121-146)/(65-94) 132/79     Weight: 78.9 kg (174 lb) (06/17/25 2345)  Body mass index is 25.7 kg/m².  Body surface area is 1.96 meters squared.    No intake or output data in the 24 hours ending 06/19/25 1110     Physical Exam   Constitutional: He is oriented to person, place, and time. No distress. He does not appear ill.   HENT:   Head: Normocephalic and atraumatic.   Mouth/Throat: Mucous membranes are moist.   Eyes: Right eye exhibits no discharge. Left eye exhibits no discharge. No scleral icterus.   Cardiovascular: Normal rate and regular rhythm.   No murmur heard.  Pulmonary/Chest: No respiratory distress. He has no wheezes. He has no rales.   Abdominal: There is no abdominal tenderness.   Musculoskeletal:          General: No tenderness.      Right lower leg: Edema present.      Left lower leg: Edema present.      Comments: Swelling of bilateral elbows to forearm + bilateral below knee swelling    Neurological: He is oriented to person, place, and time.   Skin: No lesion and no rash noted.        Significant Labs:  All pertinent lab results from the last 24 hours have been reviewed.    Significant Imaging:  Imaging results within the past 24 hours have been reviewed.

## 2025-06-19 NOTE — ASSESSMENT & PLAN NOTE
Patient with known lupus nephritis admitted with AMBER that is resolving now on lupus medications.    Plan  -continue hydroxychloroquine, MMF, prednisone, voclosporin   -follow up rheumatology recommendations  -monitor renal functon with daily BMP

## 2025-06-19 NOTE — ASSESSMENT & PLAN NOTE
Hyperkalemia is likely due to AMBER.The patients most recent potassium results are listed below.  Recent Labs     06/17/25  0453 06/18/25  0626 06/19/25  0515   K 3.7 3.4* 4.9     Plan  - Monitor for arrhythmias with EKG and/or continuous telemetry.   - Treat the hyperkalemia with Potassium Binders.   - Monitor potassium: Daily  - The patient's hyperkalemia is stable

## 2025-06-19 NOTE — PROGRESS NOTES
[facility] Medicine  Progress Note    Patient Name: Maikel Deleon  MRN: [id number]  Patient Class: IP- Inpatient   Admission Date: 6/17/2025  Length of Stay: 2 days  Attending Physician: Agustín Pena MD  Primary Care Provider: Elaina, Primary Doctor        Subjective     Principal Problem:Lupus nephritis        HPI:  25 year old man with lupus nephritis (bx in October 2024) followed by [facility] transferred from OSH for rheumatology evaluation. He initially presented for acute anemia (Hg 5.1) incidentally found on routine labs and was referred to the [facility] ED. He received 2 units pRBC at the OSH for his acute anemia and Hg has remained stable. No episodes of bleeding reported. Initial Cr 4.4 on admission with improvement once lupus meds were restarted. He reports that he stopped taking his lupus medications in March because he was not used to taking medications. He is on mycophenolate, Plaquenil, prednisone and Voclosporin. He started taking his meds again in April and stopped on June 5th after an episode of vomiting. He was [place] [facility] [place] for lupus nephritis.    Overview/Hospital Course:  Transferred from OSH for evaluation of lupus nephritis. Rheumatology and nephrology consulted.     Interval History: No acute events overnight. Pt's creatinine continues to improve. Restarted voclosporin due to improved GFR. Will continue to follow up rheum and nephro recs. Recommended compression stockings for his SHAKILA.     Review of Systems  Objective:     Vital Signs (Most Recent):  Temp: 97.7 °F (36.5 °C) (06/19/25 0944)  Pulse: 78 (06/19/25 0944)  Resp: 14 (06/19/25 0944)  BP: 132/79 (06/19/25 0944)  SpO2: 100 % (06/19/25 0944) Vital Signs (24h Range):  Temp:  [97.6 °F (36.4 °C)-98.3 °F (36.8 °C)] 97.7 °F (36.5 °C)  Pulse:  [65-84] 78  Resp:  [14-18] 14  SpO2:  [100 %] 100 %  BP: (121-146)/(65-94) 132/79     Weight: 78.9 kg (174 lb)  Body mass index is  25.7 kg/m².  No intake or output data in the 24 hours ending 06/19/25 1135      Physical Exam  Constitutional:       General: He is not in acute distress.     Appearance: He is normal weight.   HENT:      Head: Normocephalic and atraumatic.   Eyes:      Extraocular Movements: Extraocular movements intact.   Cardiovascular:      Rate and Rhythm: Normal rate and regular rhythm.   Pulmonary:      Effort: Pulmonary effort is normal. No respiratory distress.      Breath sounds: Normal breath sounds. No wheezing.   Abdominal:      Palpations: Abdomen is soft.   Musculoskeletal:      Right lower leg: Edema present.      Left lower leg: Edema present.      Comments: 1+ edema in BLE   Skin:     General: Skin is warm and dry.   Neurological:      Mental Status: He is alert. Mental status is at baseline.   Psychiatric:         Mood and Affect: Mood normal.         Behavior: Behavior normal.               Significant Labs: All pertinent labs within the past 24 hours have been reviewed.  Recent Lab Results         06/19/25  0515   06/18/25  1607        Anion Gap 8         Baso # 0.00         Basophil % 0.0         BUN 60         Calcium 7.2         Chloride 108         CO2 20         Creatinine 1.6         Urine Creatinine   83.0       Direct Alyson (SWAPNA) NEG         eGFR 60  Comment: Estimated GFR calculated using the CKD-EPI creatinine (2021) equation.         Eos # 0.00         Eos % 0.0         Glucose 118         Gran # (ANC) 4.58         Hematocrit 19.1         Hemoglobin 6.6         Immature Grans (Abs) 0.07  Comment: Mild elevation in immature granulocytes is non specific and can be seen in a variety of conditions including stress response, acute inflammation, trauma and pregnancy. Correlation with other laboratory and clinical findings is essential.         Immature Granulocytes 1.3         Lymph # 0.61         Lymph % 11.2         Magnesium  2.0         MCH 28.8         MCHC 34.6         MCV 83         Mono # 0.18          Mono % 3.3         MPV 10.2         Neut % 84.2         nRBC 0         Phosphorus Level 3.8         Platelet Count 225         Potassium 4.9  Comment: *Result may be interfered by visible hemolysis         Urine Protein/Creatinine Ratio   6.81       Urine Protein   565       RBC 2.29         RDW 16.6         Sodium 136         WBC 5.44                 Significant Imaging: I have reviewed all pertinent imaging results/findings within the past 24 hours.      Assessment & Plan  Lupus nephritis  Patient with known lupus nephritis admitted with AMBER that is resolving now on lupus medications.    Plan  -continue hydroxychloroquine, MMF, prednisone, voclosporin   -follow up rheumatology recommendations  -monitor renal functon with daily BMP  Hyperkalemia  Hyperkalemia is likely due to AMBER.The patients most recent potassium results are listed below.  Recent Labs     06/17/25  0453 06/18/25  0626 06/19/25  0515   K 3.7 3.4* 4.9     Plan  - Monitor for arrhythmias with EKG and/or continuous telemetry.   - Treat the hyperkalemia with Potassium Binders.   - Monitor potassium: Daily  - The patient's hyperkalemia is stable          Acute kidney injury superimposed on chronic kidney disease  AMBER is likely due to lupus nephritis due medication noncompliance. Baseline creatinine is 1.4. Most recent creatinine and eGFR are listed below.  Recent Labs     06/17/25  0453 06/18/25  0626 06/19/25  0515   CREATININE 2.4* 1.7* 1.6*   EGFRNORACEVR 37* 56* 60*      Plan  - AMBER is improving  - Avoid nephrotoxins and renally dose meds for GFR listed above  - Monitor urine output, serial BMP, and adjust therapy as needed  - Continue lupus medications  - Daily BMP  Anemia due to chronic kidney disease  Anemia is likely due to chronic disease due to SLE. Most recent hemoglobin and hematocrit are listed below.  Recent Labs     06/18/25  0626 06/18/25  0813 06/19/25  0515   HGB 6.6* 7.3* 6.6*   HCT 18.9* 20.8* 19.1*     Plan  - Monitor serial CBC:  Daily  - Transfuse PRBC if patient becomes hemodynamically unstable, symptomatic or H/H drops below 7/21.  - Patient has received 2 units of PRBCs on 6/13/25  - Patient's anemia is currently stable  - received additional 1 unit of pRBC's on 6/19  Nephrotic range proteinuria  - nephrology consulted, appreciate recs       VTE Risk Mitigation (From admission, onward)           Ordered     Place GIN hose  Until discontinued         06/19/25 1137     enoxaparin injection 40 mg  Daily         06/18/25 0052     IP VTE HIGH RISK PATIENT  Once         06/18/25 0052     Place sequential compression device  Until discontinued         06/18/25 0052                    Discharge Planning   LUIGI: 6/20/2025     Code Status: Full Code   Medical Readiness for Discharge Date:   Discharge Plan A: Home with family, Home                        Deniz He DO  Department of Hospital Medicine   [facility]

## 2025-06-20 VITALS
SYSTOLIC BLOOD PRESSURE: 169 MMHG | HEART RATE: 66 BPM | TEMPERATURE: 98 F | WEIGHT: 174 LBS | OXYGEN SATURATION: 100 % | RESPIRATION RATE: 17 BRPM | HEIGHT: 69 IN | BODY MASS INDEX: 25.77 KG/M2 | DIASTOLIC BLOOD PRESSURE: 94 MMHG

## 2025-06-20 LAB
ABSOLUTE EOSINOPHIL (OHS): 0 K/UL
ABSOLUTE MONOCYTE (OHS): 0.65 K/UL (ref 0.3–1)
ABSOLUTE NEUTROPHIL COUNT (OHS): 10.29 K/UL (ref 1.8–7.7)
ANION GAP (OHS): 7 MMOL/L (ref 8–16)
BASOPHILS # BLD AUTO: 0.01 K/UL
BASOPHILS NFR BLD AUTO: 0.1 %
BUN SERPL-MCNC: 62 MG/DL (ref 6–20)
C3 SERPL-MCNC: 43 MG/DL (ref 50–180)
C4 COMPLEMENT (OHS): 8 MG/DL (ref 11–44)
CALCIUM SERPL-MCNC: 7.5 MG/DL (ref 8.7–10.5)
CHLORIDE SERPL-SCNC: 108 MMOL/L (ref 95–110)
CO2 SERPL-SCNC: 22 MMOL/L (ref 23–29)
CREAT SERPL-MCNC: 1.7 MG/DL (ref 0.5–1.4)
ERYTHROCYTE [DISTWIDTH] IN BLOOD BY AUTOMATED COUNT: 15.6 % (ref 11.5–14.5)
GFR SERPLBLD CREATININE-BSD FMLA CKD-EPI: 56 ML/MIN/1.73/M2
GLUCOSE SERPL-MCNC: 113 MG/DL (ref 70–110)
HCT VFR BLD AUTO: 23.9 % (ref 40–54)
HGB BLD-MCNC: 8.5 GM/DL (ref 14–18)
IMM GRANULOCYTES # BLD AUTO: 0.19 K/UL (ref 0–0.04)
IMM GRANULOCYTES NFR BLD AUTO: 1.6 % (ref 0–0.5)
LYMPHOCYTES # BLD AUTO: 0.92 K/UL (ref 1–4.8)
MAGNESIUM SERPL-MCNC: 1.9 MG/DL (ref 1.6–2.6)
MCH RBC QN AUTO: 29.3 PG (ref 27–31)
MCHC RBC AUTO-ENTMCNC: 35.6 G/DL (ref 32–36)
MCV RBC AUTO: 82 FL (ref 82–98)
NUCLEATED RBC (/100WBC) (OHS): 0 /100 WBC
PHOSPHATE SERPL-MCNC: 4.1 MG/DL (ref 2.7–4.5)
PLATELET # BLD AUTO: 252 K/UL (ref 150–450)
PMV BLD AUTO: 10.5 FL (ref 9.2–12.9)
POTASSIUM SERPL-SCNC: 4.3 MMOL/L (ref 3.5–5.1)
RBC # BLD AUTO: 2.9 M/UL (ref 4.6–6.2)
RELATIVE EOSINOPHIL (OHS): 0 %
RELATIVE LYMPHOCYTE (OHS): 7.6 % (ref 18–48)
RELATIVE MONOCYTE (OHS): 5.4 % (ref 4–15)
RELATIVE NEUTROPHIL (OHS): 85.3 % (ref 38–73)
SODIUM SERPL-SCNC: 137 MMOL/L (ref 136–145)
WBC # BLD AUTO: 12.06 K/UL (ref 3.9–12.7)

## 2025-06-20 PROCEDURE — 80048 BASIC METABOLIC PNL TOTAL CA: CPT

## 2025-06-20 PROCEDURE — 25000003 PHARM REV CODE 250: Performed by: STUDENT IN AN ORGANIZED HEALTH CARE EDUCATION/TRAINING PROGRAM

## 2025-06-20 PROCEDURE — 63600175 PHARM REV CODE 636 W HCPCS

## 2025-06-20 PROCEDURE — 85025 COMPLETE CBC W/AUTO DIFF WBC: CPT

## 2025-06-20 PROCEDURE — 25000003 PHARM REV CODE 250

## 2025-06-20 PROCEDURE — 86160 COMPLEMENT ANTIGEN: CPT | Performed by: STUDENT IN AN ORGANIZED HEALTH CARE EDUCATION/TRAINING PROGRAM

## 2025-06-20 PROCEDURE — 83735 ASSAY OF MAGNESIUM: CPT

## 2025-06-20 PROCEDURE — 36415 COLL VENOUS BLD VENIPUNCTURE: CPT

## 2025-06-20 PROCEDURE — 63600175 PHARM REV CODE 636 W HCPCS: Mod: TB | Performed by: STUDENT IN AN ORGANIZED HEALTH CARE EDUCATION/TRAINING PROGRAM

## 2025-06-20 PROCEDURE — 84100 ASSAY OF PHOSPHORUS: CPT

## 2025-06-20 RX ORDER — FUROSEMIDE 40 MG/1
40 TABLET ORAL DAILY
Status: DISCONTINUED | OUTPATIENT
Start: 2025-06-20 | End: 2025-06-20 | Stop reason: HOSPADM

## 2025-06-20 RX ORDER — HYDROXYCHLOROQUINE SULFATE 300 MG/1
300 TABLET ORAL DAILY
Qty: 30 TABLET | Refills: 1 | Status: SHIPPED | OUTPATIENT
Start: 2025-06-21 | End: 2025-07-21

## 2025-06-20 RX ORDER — ATOVAQUONE 750 MG/5ML
1500 SUSPENSION ORAL DAILY
Qty: 300 ML | Refills: 0 | Status: SHIPPED | OUTPATIENT
Start: 2025-06-20

## 2025-06-20 RX ORDER — PREDNISONE 20 MG/1
40 TABLET ORAL DAILY
Status: DISCONTINUED | OUTPATIENT
Start: 2025-06-21 | End: 2025-06-20 | Stop reason: HOSPADM

## 2025-06-20 RX ORDER — PREDNISONE 20 MG/1
40 TABLET ORAL DAILY
Qty: 30 TABLET | Refills: 0 | Status: SHIPPED | OUTPATIENT
Start: 2025-06-21

## 2025-06-20 RX ORDER — MYCOPHENOLATE MOFETIL 250 MG/1
1500 CAPSULE ORAL 2 TIMES DAILY
Qty: 360 CAPSULE | Refills: 11 | Status: SHIPPED | OUTPATIENT
Start: 2025-06-20 | End: 2026-06-20

## 2025-06-20 RX ADMIN — HYDROXYCHLOROQUINE SULFATE 300 MG: 200 TABLET, FILM COATED ORAL at 08:06

## 2025-06-20 RX ADMIN — METHYLPREDNISOLONE SODIUM SUCCINATE 500 MG: 500 INJECTION INTRAMUSCULAR; INTRAVENOUS at 02:06

## 2025-06-20 RX ADMIN — FUROSEMIDE 40 MG: 40 TABLET ORAL at 09:06

## 2025-06-20 RX ADMIN — MYCOPHENOLATE MOFETIL 1500 MG: 250 CAPSULE ORAL at 08:06

## 2025-06-20 RX ADMIN — PANTOPRAZOLE SODIUM 40 MG: 40 TABLET, DELAYED RELEASE ORAL at 08:06

## 2025-06-20 RX ADMIN — FERROUS SULFATE TAB EC 325 MG (65 MG FE EQUIVALENT) 1 EACH: 325 (65 FE) TABLET DELAYED RESPONSE at 08:06

## 2025-06-20 NOTE — CARE UPDATE
[facility]  Nephrology        Patient Name: Maikel Deleon   MRN: [id number]   Current Provider: Agustín Pena MD  Primary Care Provider: No, Primary Doctor   Admission Date: 6/17/2025   Hospital Day: 3  Bed: 622/622 A  Principal Problem: Lupus nephritis            Nephrology Chart Review      Intake/Output Summary (Last 24 hours) at 6/20/2025 1303  Last data filed at 6/20/2025 0518  Gross per 24 hour   Intake 480 ml   Output 500 ml   Net -20 ml       Vitals:    06/20/25 0028 06/20/25 0503 06/20/25 0759 06/20/25 1202   BP: (!) 151/94 (!) 155/97 (!) 164/98 (!) 150/94   BP Location: Left arm Left arm  Left arm   Patient Position: Lying Lying  Sitting   Pulse: 65 61 63 62   Resp:   18 18   Temp: 97.7 °F (36.5 °C) 97.7 °F (36.5 °C) 97.8 °F (36.6 °C) 97.9 °F (36.6 °C)   TempSrc: Oral Oral Oral Oral   SpO2: 100% 100% 100% 100%   Weight:       Height:           Recent Labs   Lab 06/18/25  0626 06/19/25  0515 06/20/25  0456    136 137   K 3.4* 4.9 4.3    108 108   CO2 25 20* 22*   BUN 59* 60* 62*   CREATININE 1.7* 1.6* 1.7*   CALCIUM 6.6* 7.2* 7.5*   PHOS 3.9 3.8 4.1       - Cr stable  - Will continue to monitor    No other related issues identified. Please call Nephrology as needed; We will continue to follow.      Mir Hu MD.  Clinical Nephrology Fellow, PGY-4  [facility] [address]

## 2025-06-20 NOTE — SUBJECTIVE & OBJECTIVE
Interval History: No acute events overnight. Pt has no complaints today. Continues to experience leg swelling - restarted home lasix and encouraged him to wear his compression stockings. Clarifying with rheumatology his steroid taper.     Review of Systems  Objective:     Vital Signs (Most Recent):  Temp: 97.8 °F (36.6 °C) (06/20/25 0759)  Pulse: 63 (06/20/25 0759)  Resp: 18 (06/20/25 0759)  BP: (!) 164/98 (06/20/25 0759)  SpO2: 100 % (06/20/25 0759) Vital Signs (24h Range):  Temp:  [97.7 °F (36.5 °C)-98.5 °F (36.9 °C)] 97.8 °F (36.6 °C)  Pulse:  [61-75] 63  Resp:  [18] 18  SpO2:  [100 %] 100 %  BP: (131-164)/(71-98) 164/98     Weight: 78.9 kg (174 lb)  Body mass index is 25.7 kg/m².    Intake/Output Summary (Last 24 hours) at 6/20/2025 1118  Last data filed at 6/20/2025 0518  Gross per 24 hour   Intake 858.08 ml   Output 500 ml   Net 358.08 ml         Physical Exam  Constitutional:       General: He is not in acute distress.     Appearance: He is normal weight.   HENT:      Head: Normocephalic and atraumatic.   Eyes:      Extraocular Movements: Extraocular movements intact.   Cardiovascular:      Rate and Rhythm: Normal rate and regular rhythm.   Pulmonary:      Effort: Pulmonary effort is normal. No respiratory distress.      Breath sounds: Normal breath sounds. No wheezing.   Abdominal:      Palpations: Abdomen is soft.   Musculoskeletal:      Right lower leg: Edema present.      Left lower leg: Edema present.      Comments: 1+ edema in BLE   Skin:     General: Skin is warm and dry.   Neurological:      Mental Status: He is alert. Mental status is at baseline.   Psychiatric:         Mood and Affect: Mood normal.         Behavior: Behavior normal.               Significant Labs: All pertinent labs within the past 24 hours have been reviewed.  Recent Lab Results         06/20/25  0456        Anion Gap 7       Baso # 0.01       Basophil % 0.1       BUN 62       C3 Complement 43       C4 Complement 8       Calcium  7.5       Chloride 108       CO2 22       Creatinine 1.7       eGFR 56  Comment: Estimated GFR calculated using the CKD-EPI creatinine (2021) equation.       Eos # 0.00       Eos % 0.0       Glucose 113       Gran # (ANC) 10.29       Hematocrit 23.9       Hemoglobin 8.5       Immature Grans (Abs) 0.19  Comment: Mild elevation in immature granulocytes is non specific and can be seen in a variety of conditions including stress response, acute inflammation, trauma and pregnancy. Correlation with other laboratory and clinical findings is essential.       Immature Granulocytes 1.6       Lymph # 0.92       Lymph % 7.6       Magnesium  1.9       MCH 29.3       MCHC 35.6       MCV 82       Mono # 0.65       Mono % 5.4       MPV 10.5       Neut % 85.3       nRBC 0       Phosphorus Level 4.1       Platelet Count 252       Potassium 4.3       RBC 2.90       RDW 15.6       Sodium 137       WBC 12.06               Significant Imaging: I have reviewed all pertinent imaging results/findings within the past 24 hours.

## 2025-06-20 NOTE — ASSESSMENT & PLAN NOTE
Home regimen:   - Cellcept 1500mg BID  - PDN 15 mg/d  -  mg/d  - Voclosporin 23.7mg BID    Serologies/labs:  PERRY+ 1:2561, speckled, + SSA (5.47), +Anti-sm Ab (6.84), + Anti-sm/RNP (6.21)  dsDNA negative  Protein/Cr ratio 6000 mg   ANCA, SCL and RNA PolIII <20 neg  C3 (41) and C4 (8) - low  APS labs neg  UPCR 6.81     Kidney bx 10/2024:   1) membranous glomerulonephritis.   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   3)  tubular basement membrane deposits (see comment).       Assessment:   Maikel Deleon is a 25 y.o. male with SLE c/b biopsy-proven Class III/V lupus nephritis (October 2024) who was admitted to [facility] on 6/12 at the request of his rheumatologist for abnormal outpatient labs showing worsening renal function and anemia. He reports non-compliance with medications early June d/t N/V, which he attributed to the meds. SLEDAI 10 (hematuria, pyuria, low complements; UPCR pending) - consistent with lupus flare; likely d/t medication non-compliance.     Recommendations:  - IV solumedrol 500 mg q24H x 3 doses (6/19 - 6/20), then transition to prednisone 40 mg/d x 1 month and further taper w/ his outpatient rheum  - Continue home meds: Cellcept 1500mg BID,  mg/d, and Voclosporin 23.7mg BID  - Will reach out to [place] Rheumatologist [place] [facility] to arrange follow up  - Atovaquone for PJP ppx while on high dose steroids     Assessment and plan discussed with attending physician. Please see attending attestation for official recommendations.

## 2025-06-20 NOTE — ASSESSMENT & PLAN NOTE
Anemia is likely due to chronic disease due to SLE. Most recent hemoglobin and hematocrit are listed below.  Recent Labs     06/18/25  0813 06/19/25  0515 06/20/25  0456   HGB 7.3* 6.6* 8.5*   HCT 20.8* 19.1* 23.9*     Plan  - Monitor serial CBC: Daily  - Transfuse PRBC if patient becomes hemodynamically unstable, symptomatic or H/H drops below 7/21.  - Patient has received 2 units of PRBCs on 6/13/25  - Patient's anemia is currently stable  - received additional 1 unit of pRBC's on 6/19

## 2025-06-20 NOTE — PROGRESS NOTES
[facility] Medicine  Progress Note    Patient Name: Maikel Deleon  MRN: [id number]  Patient Class: IP- Inpatient   Admission Date: 6/17/2025  Length of Stay: 3 days  Attending Physician: Agustín Pena MD  Primary Care Provider: Elaina, Primary Doctor        Subjective     Principal Problem:Lupus nephritis        HPI:  25 year old man with lupus nephritis (bx in October 2024) followed by [facility] transferred from OSH for rheumatology evaluation. He initially presented for acute anemia (Hg 5.1) incidentally found on routine labs and was referred to the [facility] ED. He received 2 units pRBC at the OSH for his acute anemia and Hg has remained stable. No episodes of bleeding reported. Initial Cr 4.4 on admission with improvement once lupus meds were restarted. He reports that he stopped taking his lupus medications in March because he was not used to taking medications. He is on mycophenolate, Plaquenil, prednisone and Voclosporin. He started taking his meds again in April and stopped on June 5th after an episode of vomiting. He was transferred to [facility] for rheumatology evaluation for lupus nephritis.    Overview/Hospital Course:  Transferred from OSH for evaluation of lupus nephritis. Rheumatology and nephrology consulted. Nephrology assessed the patient's case and recommended outpatient follow up and to hold his ACE/ARB in setting of AMBER in addition to avoiding aggressive diuresis. Rheumatology recommended continuing his home immunosuppressive regimen in addition to a steroid taper. With his high dose steroid taper in mind he was started on atovaquone prophylaxis.     Interval History: No acute events overnight. Pt has no complaints today. Continues to experience leg swelling - restarted home lasix and encouraged him to wear his compression stockings. Clarifying with rheumatology his steroid taper.     Review of Systems  Objective:     Vital Signs (Most Recent):  Temp: 97.8 °F  (36.6 °C) (06/20/25 0759)  Pulse: 63 (06/20/25 0759)  Resp: 18 (06/20/25 0759)  BP: (!) 164/98 (06/20/25 0759)  SpO2: 100 % (06/20/25 0759) Vital Signs (24h Range):  Temp:  [97.7 °F (36.5 °C)-98.5 °F (36.9 °C)] 97.8 °F (36.6 °C)  Pulse:  [61-75] 63  Resp:  [18] 18  SpO2:  [100 %] 100 %  BP: (131-164)/(71-98) 164/98     Weight: 78.9 kg (174 lb)  Body mass index is 25.7 kg/m².    Intake/Output Summary (Last 24 hours) at 6/20/2025 1118  Last data filed at 6/20/2025 0518  Gross per 24 hour   Intake 858.08 ml   Output 500 ml   Net 358.08 ml         Physical Exam  Constitutional:       General: He is not in acute distress.     Appearance: He is normal weight.   HENT:      Head: Normocephalic and atraumatic.   Eyes:      Extraocular Movements: Extraocular movements intact.   Cardiovascular:      Rate and Rhythm: Normal rate and regular rhythm.   Pulmonary:      Effort: Pulmonary effort is normal. No respiratory distress.      Breath sounds: Normal breath sounds. No wheezing.   Abdominal:      Palpations: Abdomen is soft.   Musculoskeletal:      Right lower leg: Edema present.      Left lower leg: Edema present.      Comments: 1+ edema in BLE   Skin:     General: Skin is warm and dry.   Neurological:      Mental Status: He is alert. Mental status is at baseline.   Psychiatric:         Mood and Affect: Mood normal.         Behavior: Behavior normal.               Significant Labs: All pertinent labs within the past 24 hours have been reviewed.  Recent Lab Results         06/20/25  0456        Anion Gap 7       Baso # 0.01       Basophil % 0.1       BUN 62       C3 Complement 43       C4 Complement 8       Calcium 7.5       Chloride 108       CO2 22       Creatinine 1.7       eGFR 56  Comment: Estimated GFR calculated using the CKD-EPI creatinine (2021) equation.       Eos # 0.00       Eos % 0.0       Glucose 113       Gran # (ANC) 10.29       Hematocrit 23.9       Hemoglobin 8.5       Immature Grans (Abs) 0.19  Comment: Mild  elevation in immature granulocytes is non specific and can be seen in a variety of conditions including stress response, acute inflammation, trauma and pregnancy. Correlation with other laboratory and clinical findings is essential.       Immature Granulocytes 1.6       Lymph # 0.92       Lymph % 7.6       Magnesium  1.9       MCH 29.3       MCHC 35.6       MCV 82       Mono # 0.65       Mono % 5.4       MPV 10.5       Neut % 85.3       nRBC 0       Phosphorus Level 4.1       Platelet Count 252       Potassium 4.3       RBC 2.90       RDW 15.6       Sodium 137       WBC 12.06               Significant Imaging: I have reviewed all pertinent imaging results/findings within the past 24 hours.      Assessment & Plan  Lupus nephritis  Patient with known lupus nephritis admitted with AMBER that is resolving now on lupus medications.    Plan  -continue hydroxychloroquine, MMF, prednisone, voclosporin   -follow up rheumatology recommendations  -monitor renal functon with daily BMP  Hyperkalemia  Hyperkalemia is likely due to AMBER.The patients most recent potassium results are listed below.  Recent Labs     06/18/25  0626 06/19/25  0515 06/20/25  0456   K 3.4* 4.9 4.3     Plan  - Monitor for arrhythmias with EKG and/or continuous telemetry.   - Treat the hyperkalemia with Potassium Binders.   - Monitor potassium: Daily  - The patient's hyperkalemia is stable          Acute kidney injury superimposed on chronic kidney disease  AMBER is likely due to lupus nephritis due medication noncompliance. Baseline creatinine is 1.4. Most recent creatinine and eGFR are listed below.  Recent Labs     06/18/25  0626 06/19/25  0515 06/20/25  0456   CREATININE 1.7* 1.6* 1.7*   EGFRNORACEVR 56* 60* 56*      Plan  - AMBER is improving  - Avoid nephrotoxins and renally dose meds for GFR listed above  - Monitor urine output, serial BMP, and adjust therapy as needed  - Continue lupus medications  - Daily BMP  Anemia due to chronic kidney disease  Anemia  is likely due to chronic disease due to SLE. Most recent hemoglobin and hematocrit are listed below.  Recent Labs     06/18/25  0813 06/19/25  0515 06/20/25  0456   HGB 7.3* 6.6* 8.5*   HCT 20.8* 19.1* 23.9*     Plan  - Monitor serial CBC: Daily  - Transfuse PRBC if patient becomes hemodynamically unstable, symptomatic or H/H drops below 7/21.  - Patient has received 2 units of PRBCs on 6/13/25  - Patient's anemia is currently stable  - received additional 1 unit of pRBC's on 6/19  Nephrotic range proteinuria  - nephrology consulted, appreciate recs     VTE Risk Mitigation (From admission, onward)           Ordered     Place GIN hose  Until discontinued         06/19/25 1137     enoxaparin injection 40 mg  Daily         06/18/25 0052     IP VTE HIGH RISK PATIENT  Once         06/18/25 0052     Place sequential compression device  Until discontinued         06/18/25 0052                    Discharge Planning   LUIGI: 6/22/2025     Code Status: Full Code   Medical Readiness for Discharge Date:   Discharge Plan A: Home, Home with family          SDOH Screening:  The patient was screened for utility difficulties, food insecurity, transport difficulties, housing insecurity, and interpersonal safety and there were no concerns identified this admission.                      Deniz He DO  Department of Hospital Medicine   [facility] - Med Surg

## 2025-06-20 NOTE — ASSESSMENT & PLAN NOTE
Hyperkalemia is likely due to AMBER.The patients most recent potassium results are listed below.  Recent Labs     06/18/25  0626 06/19/25  0515 06/20/25  0456   K 3.4* 4.9 4.3     Plan  - Monitor for arrhythmias with EKG and/or continuous telemetry.   - Treat the hyperkalemia with Potassium Binders.   - Monitor potassium: Daily  - The patient's hyperkalemia is stable

## 2025-06-20 NOTE — DISCHARGE INSTRUCTIONS
Our goal at [facility] is to always give you outstanding care and exceptional service. You may receive a survey from [employer] by mail, text or e-mail in the next 24-48 hours asking about the care you received with us. The survey should only take 5-10 minutes to complete and is very important to us.     Your feedback provides us with a way to recognize our staff who work tirelessly to provide the best care! Also, your responses help us learn how to improve when your experience was below our aspiration of excellence. We are always looking for ways to improve your stay. We WILL use your feedback to continue making improvements to help us provide the highest quality care. We keep your personal information and feedback confidential. We appreciate your time completing this survey and can't wait to hear from you!!!    We look forward to your continued care with us! Thanks so much for choosing [facility] for your healthcare needs!

## 2025-06-20 NOTE — SUBJECTIVE & OBJECTIVE
Interval History: No overnight events. No acute complaints. Peripheral swelling unchanged. Ready to go home.     Current Facility-Administered Medications   Medication Frequency    0.9%  NaCl infusion (for blood administration) Q24H PRN    dextrose 50% injection 12.5 g PRN    dextrose 50% injection 25 g PRN    enoxaparin injection 40 mg Daily    ferrous sulfate tablet 1 each Daily    furosemide tablet 40 mg Daily    glucagon (human recombinant) injection 1 mg PRN    glucose chewable tablet 16 g PRN    glucose chewable tablet 24 g PRN    hydrOXYchloroQUINE split tablet 300 mg Daily    methylPREDNISolone sodium succinate (SOLU-MEDROL) 500 mg in 0.9% NaCl 100 mL IVPB Q24H    mupirocin 2 % ointment BID    mycophenolate capsule 1,500 mg BID    naloxone 0.4 mg/mL injection 0.02 mg PRN    pantoprazole EC tablet 40 mg Daily    [START ON 6/21/2025] predniSONE tablet 40 mg Daily    sodium chloride 0.9% flush 10 mL Q12H PRN    Voclosporin Cap 23.7 mg [PATIENT SUPPLIED] - SEE INSTRUCTION BID     Objective:     Vital Signs (Most Recent):  Temp: 97.8 °F (36.6 °C) (06/20/25 0759)  Pulse: 63 (06/20/25 0759)  Resp: 18 (06/20/25 0759)  BP: (!) 164/98 (06/20/25 0759)  SpO2: 100 % (06/20/25 0759) Vital Signs (24h Range):  Temp:  [97.7 °F (36.5 °C)-98.5 °F (36.9 °C)] 97.8 °F (36.6 °C)  Pulse:  [61-75] 63  Resp:  [18] 18  SpO2:  [100 %] 100 %  BP: (131-164)/(71-98) 164/98     Weight: 78.9 kg (174 lb) (06/17/25 2345)  Body mass index is 25.7 kg/m².  Body surface area is 1.96 meters squared.      Intake/Output Summary (Last 24 hours) at 6/20/2025 1100  Last data filed at 6/20/2025 0518  Gross per 24 hour   Intake 858.08 ml   Output 500 ml   Net 358.08 ml        Physical Exam   Constitutional: He is oriented to person, place, and time. No distress. He does not appear ill.   HENT:   Head: Normocephalic and atraumatic.   Mouth/Throat: Mucous membranes are moist.   Eyes: Right eye exhibits no discharge. Left eye exhibits no discharge. No  scleral icterus.   Cardiovascular: Normal rate and regular rhythm.   No murmur heard.  Pulmonary/Chest: No respiratory distress. He has no wheezes. He has no rales.   Abdominal: There is no abdominal tenderness.   Musculoskeletal:         General: No tenderness.      Right lower leg: Edema present.      Left lower leg: Edema present.      Comments: B/l arm and b/l below knee swelling    Neurological: He is oriented to person, place, and time.   Skin: No lesion and no rash noted.        Significant Labs:  All pertinent lab results from the last 24 hours have been reviewed.    Significant Imaging:  Imaging results within the past 24 hours have been reviewed.

## 2025-06-20 NOTE — DISCHARGE SUMMARY
[facility] Medicine  Discharge Summary      Patient Name: Maikel Deleon  MRN: [id number]  NILESH: [id number]  Patient Class: IP- Inpatient  Admission Date: 6/17/2025  Hospital Length of Stay: 3 days  Discharge Date and Time: 06/20/2025 5:47 PM  Attending Physician: Agustín Pena MD   Discharging Provider: Deniz He DO  Primary Care Provider: Elaina Primary Doctor  [facility] Medicine Team: [facility] 1 Deniz He DO  Primary Care Team: [facility] 1    HPI:   25 year old man with lupus nephritis (bx in October 2024) followed by [facility] transferred from [facility] for rheumatology evaluation. He initially presented for acute anemia (Hg 5.1) incidentally found on routine labs and was referred to the [facility] ED. He received 2 units pRBC at the OS for his acute anemia and Hg has remained stable. No episodes of bleeding reported. Initial Cr 4.4 on admission with improvement once lupus meds were restarted. He reports that he stopped taking his lupus medications in March because he was not used to taking medications. He is on mycophenolate, Plaquenil, prednisone and Voclosporin. He started taking his meds again in April and stopped on June 5th after an episode of vomiting. He was transferred to [facility] for rheumatology evaluation for lupus nephritis.    * No surgery found *      Hospital Course:   25 year old man with lupus nephritis (bx in October 2024) who follows at [facility] with Dr. Correia, was transferred from [facility] for rheumatology evaluation due to a lupus flare due to medication non adherence due to n/v with an AMBER as well as anemia. On admission to [facility] he had a Hgb 5.2, Cr 4.4 from baseline 1.3 and was given 3 units PRBC and nephro was consulted as well. His anemia was recurrent and he had low complement and was transferred for rheum evaluation. Rheumatology and nephrology were consulted. Rheum recommended a nephro consult, SWAPNA and UPCR, as well  as 3 doses of IV solumedrol 500mg Q24H. Nephrology assessed the patient's case and recommended outpatient follow up and to hold his ACE/ARB in setting of AMBER in addition to avoiding aggressive diuresis. Rheumatology recommended continuing his home immunosuppressive regimen in addition to a steroid taper. With his high dose steroid taper in mind he was started on atovaquone prophylaxis.        Goals of Care Treatment Preferences:  Code Status: Full Code         Consults:   Consults (From admission, onward)          Status Ordering Provider     Inpatient consult to Nephrology  Once        Provider:  (Not yet assigned)    Completed JORDI REID     Inpatient consult to Registered Dietitian/Nutritionist  Once        Provider:  (Not yet assigned)    Completed JORDI REID     Inpatient consult to Rheumatology  Once        Provider:  (Not yet assigned)    Completed LESLIE BRADLEY            Assessment & Plan  Lupus nephritis  Patient with known lupus nephritis admitted with AMBER that is resolving now on lupus medications.    Plan  -continue hydroxychloroquine, MMF, prednisone, voclosporin   -follow up rheumatology recommendations  -monitor renal functon with daily BMP  Hyperkalemia  Hyperkalemia is likely due to AMBER.The patients most recent potassium results are listed below.  Recent Labs     06/18/25  0626 06/19/25  0515 06/20/25  0456   K 3.4* 4.9 4.3     Plan  - Monitor for arrhythmias with EKG and/or continuous telemetry.   - Treat the hyperkalemia with Potassium Binders.   - Monitor potassium: Daily  - The patient's hyperkalemia is stable          Acute kidney injury superimposed on chronic kidney disease  AMBER is likely due to lupus nephritis due medication noncompliance. Baseline creatinine is 1.4. Most recent creatinine and eGFR are listed below.  Recent Labs     06/18/25  0626 06/19/25  0515 06/20/25  0456   CREATININE 1.7* 1.6* 1.7*   EGFRNORACEVR 56* 60* 56*      Plan  - AMBER is improving  - Avoid  nephrotoxins and renally dose meds for GFR listed above  - Monitor urine output, serial BMP, and adjust therapy as needed  - Continue lupus medications  - Daily BMP  Anemia due to chronic kidney disease  Anemia is likely due to chronic disease due to SLE. Most recent hemoglobin and hematocrit are listed below.  Recent Labs     06/18/25  0813 06/19/25  0515 06/20/25  0456   HGB 7.3* 6.6* 8.5*   HCT 20.8* 19.1* 23.9*     Plan  - Monitor serial CBC: Daily  - Transfuse PRBC if patient becomes hemodynamically unstable, symptomatic or H/H drops below 7/21.  - Patient has received 2 units of PRBCs on 6/13/25  - Patient's anemia is currently stable  - received additional 1 unit of pRBC's on 6/19  Nephrotic range proteinuria  - nephrology consulted, appreciate recs     Final Active Diagnoses:    Diagnosis Date Noted POA    PRINCIPAL PROBLEM:  Lupus nephritis [M32.14] 06/13/2025 Yes     Chronic    Nephrotic range proteinuria [R80.9] 06/19/2025 Yes    Anemia due to chronic kidney disease [N18.9, D63.1] 06/13/2025 Yes     Chronic    Acute kidney injury superimposed on chronic kidney disease [N17.9, N18.9] 06/13/2025 Yes    Hyperkalemia [E87.5] 10/20/2024 Yes      Problems Resolved During this Admission:       Discharged Condition: fair    Disposition:     Follow Up:    Patient Instructions:   No discharge procedures on file.        Pending Diagnostic Studies:       None           Medications:  Reconciled Home Medications:      Medication List        PAUSE taking these medications      lisinopriL 5 MG tablet  Wait to take this until your doctor or other care provider tells you to start again.  Commonly known as: PRINIVIL,ZESTRIL  Take 5 mg by mouth once daily.            START taking these medications      atovaquone 750 mg/5 mL Susp oral liquid  Commonly known as: MEPRON  Take 10 mLs (1,500 mg total) by mouth once daily.     mycophenolate 250 mg Cap  Commonly known as: CELLCEPT  Take 6 capsules (1,500 mg total) by mouth 2 (two)  times daily.  Replaces: mycophenolate 500 mg Tab            CHANGE how you take these medications      hydroxychloroquine 300 mg Tab  Take 300 mg by mouth once daily.  Start taking on: June 21, 2025  What changed: medication strength     predniSONE 20 MG tablet  Commonly known as: DELTASONE  Take 2 tablets (40 mg total) by mouth once daily.  Start taking on: June 21, 2025  What changed:   medication strength  how much to take            CONTINUE taking these medications      diclofenac sodium 1 % Gel  Commonly known as: VOLTAREN  Apply 2 g topically 4 (four) times daily as needed (pain).     ergocalciferol 50,000 unit Cap  Commonly known as: ERGOCALCIFEROL  Take 50,000 Units by mouth every 7 days.     ferrous sulfate 325 mg (65 mg iron) Tab tablet  Commonly known as: FEOSOL  Take 325 mg by mouth once daily.     furosemide 40 MG tablet  Commonly known as: LASIX  Take 1 tablet (40 mg total) by mouth once daily.     ondansetron 4 MG tablet  Commonly known as: ZOFRAN  Take 4 mg by mouth 2 (two) times daily as needed for Nausea.     pantoprazole 40 MG tablet  Commonly known as: PROTONIX  Take 1 tablet (40 mg total) by mouth once daily.     sodium bicarbonate 650 MG tablet  Take 2 tablets (1,300 mg total) by mouth 3 (three) times daily.     voclosporin 7.9 mg Cap  Take 23.7 mg by mouth 2 (two) times a day.            STOP taking these medications      mycophenolate 500 mg Tab  Commonly known as: CELLCEPT  Replaced by: mycophenolate 250 mg Cap              Indwelling Lines/Drains at time of discharge:   Lines/Drains/Airways       None                       Time spent on the discharge of patient: 40 minutes         Deniz He DO  Department of Hospital Medicine  [facility] Surg

## 2025-06-20 NOTE — PROGRESS NOTES
[facility] - Med Surg  Rheumatology  Progress Note    Patient Name: Maikel Deleon  MRN: [id number]  Admission Date: 6/17/2025  Hospital Length of Stay: 3 days  Code Status: Full Code   Attending Provider: Agustín Pena MD  Primary Care Physician: Elaina, Primary Doctor  Principal Problem: Lupus nephritis    Subjective:     HPI: Maikel Deleon is a  25 y.o. male with SLE c/b biopsy-proven Class III/V lupus nephritis (October 2024) who was admitted to [place] on 6/12 at the request of his rheumatologist for abnormal outpatient labs showing worsening renal function and anemia. For management of SLE, he is currently prescribed plaquenil 300mg, Cellcept 1500mg BID, prednisone 15mg daily, and voclosporin 23.7mg. He has had intermitted compliance with meds due to concerns for side effects, specifically nausea/vomiting, a chronic issue for him. He reports he had not taken any of his meds since June 5th d/t N/V. He was found to have a Cr of 4.4 from baseline of 1.3, hyperkalemia to 5.7, and hgb of 5.2. He was transfused 3 total units prbc with refractory anemia. Nephrology consulted, and thought AMBER to be due to prerenal etiology/IVVD due to severe anemia. His prednisone, MMF, and cellcept were resumed. Given the recurrent anemia and hypocomplementemia, he was transferred to [facility] for further evaluation by rheumatology.     On arrival to [facility], he is HDS on RA. CBC notable for hgb 7.3, WBC and plts WNL. Cr 1.7 and is improving. Hyperkalemia resolved. Albumin 1.0. On 6/15, C3 low at 41, C4 low at 8. CRP nml at 1.48. Ds-DNA was again negative on 6/15.    Patient of Dr. Correia/Laury (LOV 6/12/25).     Rheum History:  - Normal state of health until summer 2024, when he developed SOB, acute renal insufficiency and severe peripheral edema  October 19, 2024 when he presented to the [facility] in [place]  - Labs/Serologies: cr 2.7, UPCR 6gm, PERRY 1: 2560, positive SSA, positive anti Flores, positive RNP, negative  double-stranded DNA, low C3, low C4  - Kidney bx that admission confirmed class 3/5 lupus nephritis  - He received 1 g of IV Solu-Medrol for 3 days and then transitioned to prednisone 60 mg daily. CellCept 500 mg BID and Plaquenil 200 mg day were also added.   - December 2024, nephrology decreased prednisone to 20 mg/d, Cellcept increased to 1500 mg BID and remained on  mg/d. After this visit, he had to transition care to [facility] due to insurance reasons  - Established with [facility] Feb 2025, who noted compliance with medications. 10mg lisinopril added for additional management of proteinuria, however patient did not start.   - Established with rheumatology in March 2025, who decrease pred to 15mg and started voclosporin in addition to continued Cellcept and plaquenil.   - Saw nephrology in May, who noted that he had been off his meds for several weeks due to n/v that he presumed were side effects of the medications. He was encouraged to continue the medications. 10g proteinuria at that visit.   - Again saw rheumatology 6/12. He noted some LE edema but otherwise no symptom flares. They recommended c/w Cellcept 1500mg BID, PDN 15 mg/d,  mg/d and voclosporin 23.7 BID. Routine labs ordered, which ultimately led to admission.       Rheum ROS:  No fevers or chills  + unintentional weight loss - 30-40 lbs   No rash   No malar rash or photosensitivity  No oral ulcers    No genital ulcers  No dry eyes and dry mouth   No conjunctivitis or uveitis or scleritis or episcleritis  No SOB  No dysphagia  No raynaud's   No digital ulcers   No bloody diarrhea  No focal weakness  No joint pain  + peripheral swelling      History  Social: denies alcohol, tobacco, drug use. Former marijuana use, stopped last summer  Family: denies family h/o autoimmune disease; father is adopted - unknown family history      Interval History: No overnight events. No acute complaints. Peripheral swelling unchanged. Ready to go home.      Current Facility-Administered Medications   Medication Frequency    0.9%  NaCl infusion (for blood administration) Q24H PRN    dextrose 50% injection 12.5 g PRN    dextrose 50% injection 25 g PRN    enoxaparin injection 40 mg Daily    ferrous sulfate tablet 1 each Daily    furosemide tablet 40 mg Daily    glucagon (human recombinant) injection 1 mg PRN    glucose chewable tablet 16 g PRN    glucose chewable tablet 24 g PRN    hydrOXYchloroQUINE split tablet 300 mg Daily    methylPREDNISolone sodium succinate (SOLU-MEDROL) 500 mg in 0.9% NaCl 100 mL IVPB Q24H    mupirocin 2 % ointment BID    mycophenolate capsule 1,500 mg BID    naloxone 0.4 mg/mL injection 0.02 mg PRN    pantoprazole EC tablet 40 mg Daily    [START ON 6/21/2025] predniSONE tablet 40 mg Daily    sodium chloride 0.9% flush 10 mL Q12H PRN    Voclosporin Cap 23.7 mg [PATIENT SUPPLIED] - SEE INSTRUCTION BID     Objective:     Vital Signs (Most Recent):  Temp: 97.8 °F (36.6 °C) (06/20/25 0759)  Pulse: 63 (06/20/25 0759)  Resp: 18 (06/20/25 0759)  BP: (!) 164/98 (06/20/25 0759)  SpO2: 100 % (06/20/25 0759) Vital Signs (24h Range):  Temp:  [97.7 °F (36.5 °C)-98.5 °F (36.9 °C)] 97.8 °F (36.6 °C)  Pulse:  [61-75] 63  Resp:  [18] 18  SpO2:  [100 %] 100 %  BP: (131-164)/(71-98) 164/98     Weight: 78.9 kg (174 lb) (06/17/25 2345)  Body mass index is 25.7 kg/m².  Body surface area is 1.96 meters squared.      Intake/Output Summary (Last 24 hours) at 6/20/2025 1100  Last data filed at 6/20/2025 0518  Gross per 24 hour   Intake 858.08 ml   Output 500 ml   Net 358.08 ml        Physical Exam   Constitutional: He is oriented to person, place, and time. No distress. He does not appear ill.   HENT:   Head: Normocephalic and atraumatic.   Mouth/Throat: Mucous membranes are moist.   Eyes: Right eye exhibits no discharge. Left eye exhibits no discharge. No scleral icterus.   Cardiovascular: Normal rate and regular rhythm.   No murmur heard.  Pulmonary/Chest: No  respiratory distress. He has no wheezes. He has no rales.   Abdominal: There is no abdominal tenderness.   Musculoskeletal:         General: No tenderness.      Right lower leg: Edema present.      Left lower leg: Edema present.      Comments: B/l arm and b/l below knee swelling    Neurological: He is oriented to person, place, and time.   Skin: No lesion and no rash noted.        Significant Labs:  All pertinent lab results from the last 24 hours have been reviewed.    Significant Imaging:  Imaging results within the past 24 hours have been reviewed.  Assessment/Plan:     *SLE  * Lupus nephritis  Home regimen:   - Cellcept 1500mg BID  - PDN 15 mg/d  -  mg/d  - Voclosporin 23.7mg BID    Serologies/labs:  PERRY+ 1:2561, speckled, + SSA (5.47), +Anti-sm Ab (6.84), + Anti-sm/RNP (6.21)  dsDNA negative  Protein/Cr ratio 6000 mg   ANCA, SCL and RNA PolIII <20 neg  C3 (41) and C4 (8) - low  APS labs neg  UPCR 6.81     Kidney bx 10/2024:   1) membranous glomerulonephritis.   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   3)  tubular basement membrane deposits (see comment).       Assessment:   Maikel Dleeon is a 25 y.o. male with SLE c/b biopsy-proven Class III/V lupus nephritis (October 2024) who was admitted to [facility] on 6/12 at the request of his rheumatologist for abnormal outpatient labs showing worsening renal function and anemia. He reports non-compliance with medications early June d/t N/V, which he attributed to the meds. SLEDAI 10 (hematuria, pyuria, low complements; UPCR pending) - consistent with lupus flare; likely d/t medication non-compliance.     Recommendations:  - IV solumedrol 500 mg q24H x 3 doses (6/18 - 6/20), then transition to prednisone 40 mg/d with further taper w/ his outpatient rheum. Will reach out to his Rheumatologist, Dr. Schaeffer at [facility] to arrange follow up  - Continue home meds: Cellcept 1500mg BID,  mg/d, and Voclosporin 23.7mg  BID  - Atovaquone for PJP ppx while on high dose steroids     Assessment and plan discussed with attending physician. Please see attending attestation for official recommendations.       Corine Gonzalez MD  Rheumatology Fellow  [facility]

## 2025-06-20 NOTE — ASSESSMENT & PLAN NOTE
AMBER is likely due to lupus nephritis due medication noncompliance. Baseline creatinine is 1.4. Most recent creatinine and eGFR are listed below.  Recent Labs     06/18/25  0626 06/19/25  0515 06/20/25  0456   CREATININE 1.7* 1.6* 1.7*   EGFRNORACEVR 56* 60* 56*      Plan  - AMBER is improving  - Avoid nephrotoxins and renally dose meds for GFR listed above  - Monitor urine output, serial BMP, and adjust therapy as needed  - Continue lupus medications  - Daily BMP

## 2025-06-21 NOTE — DISCHARGE SUMMARY
[facility] - Telemetry ([facility])  [facility] Medicine  Discharge Summary      Patient Name: Maikel Deleon  MRN: [id number]  NILESH: [id number]  Patient Class: IP- Inpatient  Admission Date: 6/12/2025  Hospital Length of Stay: 5 days  Discharge Date and Time: 6/17/2025 10:00 AM  Attending Physician: Elaina att. providers found   Discharging Provider: Christopher Campbell DO  Primary Care Provider: Elaina, Primary Doctor    Primary Care Team: Networked reference to record PCT     HPI:   Maikel Deleon is a 25 y.o. male with a PMH  has a past medical history of Marijuana use and Systemic lupus erythematosus, organ or system involvement unspecified. who presented to the ED directed by his rheumatologist after outpatient labs revealed significant anemia and AMBER.  Patient with recently diagnosed with lupus nephritis back in October 2024 in his been noncompliant with home medications.  Patient was last seen by Dr. Giron from Nephrology on 5/12/25 and was reported to be progressing nicely towards remission and stressed importance of adherence and Dr. Correia from Rheumatology yesterday who documented patient was more adherent to his medications however, patient reported to me he has not taken any of his medications since June 5 secondary to potential side affects.  Patient main complaint includes increased tiredness/fatigue as well as lower extremity swelling but reported all other review of systems negative except as noted above including negative epistaxis, hemoptysis, hematemesis, hematuria, melena, or hematochezia.  Initial workup in the ED revealed patient to be afebrile without leukocytosis, hemodynamically stable, H/H 5.2/15.8, potassium 5.7, BUN 60, creatinine/GFR 4.4/18, calcium 7.4, phosphorus 6.1, and chest x-ray negative for acute findings.  Patient admitted to Hospital Medicine inpatient for continued medical management.  Nephrology consulted and awaiting further evaluation/recommendations.    PCP: Elaina, Primary  Doctor      * No surgery found *      Hospital Course:   6/12-6/13: Admitted to Hospital Medicine for evaluation of anemia and AMBER on CKD concerns.  Underwent 2u pRbc transfusion with improvement in anemia.  Nephrology consulted for AMBER concerns, evaluation noting possibly related to intravascular volume depletion due to low urine sodium versus anemia.  Started on Lokelma, IV fluids and furosemide trial for hyperkalemia.    6/14:  Mild improvement in renal function.  Hyperkalemia resolved.  6/15:  Electrolytes remained stable, but renal function remains concerning.  Nephrology following, further evaluation with renal ultrasound nonacute.  Lab work pending for further evaluation given concerns for lupus etiology.  Hemoglobin downtrended to 6.9, so 1u pRbc transfused.   6/16:  Renal function with some improvement, but still remains abnormal compared to baseline.  Hypocomplementemia, recurrent anemia on labs concerning for lupus flare.  Discussed with Rheumatology provider at [facility], plans for transfer to [facility] for evaluation by Rheumatology.   6/17 Transferred to [facility] for further evaluation.    Discharge plan of care was discussed at length with patient. All questions and concerns were answered.  Patient was in agreement with plan of care going forward. . Patient continued to remain afebrile, hemodynamically stable, able to tolerate diet, and ambulate without any significant concerns. Patient seen and examined on the day of discharge. Patient deemed stable for discharge for transfer to [facility].              Goals of Care Treatment Preferences:  Code Status: Full Code         Consults:   Consults (From admission, onward)          Status Ordering Provider     Inpatient consult to Nephrology  Once        Provider:  Davey Flores MD    Completed KM CARDENAS            Final Active Diagnoses:    Diagnosis Date Noted POA    PRINCIPAL PROBLEM:  Acute kidney injury  superimposed on chronic kidney disease [N17.9, N18.9] 06/13/2025 Yes    Lupus nephritis [M32.14] 06/13/2025 Yes     Chronic    Anemia due to chronic kidney disease [N18.9, D63.1] 06/13/2025 Yes     Chronic    Hyperkalemia [E87.5] 10/20/2024 Yes    Hypertension [I10] 10/19/2024 Yes     Chronic      Problems Resolved During this Admission:       Discharged Condition: stable    Disposition: Short Term Hospital    Follow Up:   Follow-up Information       PCP.                           Patient Instructions:   No discharge procedures on file.    Significant Labs  Result Value Ref Range    Magnesium  1.5 (L) 1.6 - 2.6 mg/dL   C3 Complement    Collection Time: 06/15/25  9:57 AM   Result Value Ref Range    C3 Complement 41 (L) 50 - 180 mg/dL   C4 Complement    Collection Time: 06/15/25  9:57 AM   Result Value Ref Range    C4 Complement 8 (L) 11 - 44 mg/dL   Haptoglobin    Collection Time: 06/15/25  8:13 PM   Result Value Ref Range    Haptoglobin 147 30 - 250 mg/dL   Reticulocytes    Collection Time: 06/15/25  8:13 PM   Result Value Ref Range    Retic Count, Automated 1.8 0.4 - 2.0 %   Lactate dehydrogenase    Collection Time: 06/15/25  8:13 PM   Result Value Ref Range    Lactate Dehydrogenase 226 110 - 260 U/L   Peripheral Smear Review for Hemolysis or Low Platelets    Collection Time: 06/15/25  8:13 PM   Result Value Ref Range    PATHOLOGIST REVIEW - CBC/DIFF ([facility])       Erythrocytes:  Red blood cells with microcytosis and increased central pallor.  Granulocytes:  Slight left shift of granulocytes but with a normal maturation sequence.  Lymphocytes:  Mild lymphocytopenia.  Monocytes:  Reactive appearing monocytes.  Blasts:  Negative for increased blasts.  Platelets:  Platelets within normal limits.      Interpreting Pathologist: Maikel Wilson MD       CBC with Differential    Collection Time: 06/15/25  8:13 PM   Result Value Ref Range    WBC 7.24 3.90 - 12.70 K/uL    RBC 2.86 (L) 4.60 - 6.20 M/uL    HGB 8.3 (L) 14.0 -  18.0 gm/dL    HCT 23.2 (L) 40.0 - 54.0 %    MCV 81 (L) 82 - 98 fL    MCH 29.0 27.0 - 31.0 pg    MCHC 35.8 32.0 - 36.0 g/dL    RDW 15.2 (H) 11.5 - 14.5 %    Platelet Count 171 150 - 450 K/uL    MPV 10.0 9.2 - 12.9 fL    Nucleated RBC 0 <=0 /100 WBC    Neut % 86.6 (H) 38 - 73 %    Lymph % 7.6 (L) 18 - 48 %    Mono % 4.6 4 - 15 %    Eos % 0.0 <=8 %    Basophil % 0.1 <=1.9 %    Imm Grans % 1.1 (H) 0.0 - 0.5 %    Neut # 6.27 1.8 - 7.7 K/uL    Lymph # 0.55 (L) 1 - 4.8 K/uL    Mono # 0.33 0.3 - 1 K/uL    Eos # 0.00 <=0.5 K/uL    Baso # 0.01 <=0.2 K/uL    Imm Grans # 0.08 (H) 0.00 - 0.04 K/uL   Magnesium    Collection Time: 06/16/25  4:51 AM   Result Value Ref Range    Magnesium  1.9 1.6 - 2.6 mg/dL   Comprehensive Metabolic Panel    Collection Time: 06/16/25  4:51 AM   Result Value Ref Range    Sodium 134 (L) 136 - 145 mmol/L    Potassium 4.5 3.5 - 5.1 mmol/L    Chloride 107 95 - 110 mmol/L    CO2 18 (L) 23 - 29 mmol/L    Glucose 77 70 - 110 mg/dL    BUN 75 (H) 6 - 20 mg/dL    Creatinine 3.1 (H) 0.5 - 1.4 mg/dL    Calcium 6.7 (LL) 8.7 - 10.5 mg/dL    Protein Total 4.6 (L) 6.0 - 8.4 gm/dL    Albumin 1.1 (L) 3.5 - 5.2 g/dL    Bilirubin Total 0.8 0.1 - 1.0 mg/dL    ALP 70 40 - 150 unit/L    AST 15 11 - 45 unit/L    ALT 5 (L) 10 - 44 unit/L    Anion Gap 9 8 - 16 mmol/L    eGFR 27 (L) >60 mL/min/1.73/m2   High sensitivity CRP    Collection Time: 06/16/25  4:51 AM   Result Value Ref Range    CRP, High Sensitivity 1.48 <=3.19 mg/L   Phosphorus    Collection Time: 06/16/25  4:51 AM   Result Value Ref Range    Phosphorus Level 5.6 (H) 2.7 - 4.5 mg/dL   CBC with Differential    Collection Time: 06/16/25  4:51 AM   Result Value Ref Range    WBC 7.39 3.90 - 12.70 K/uL    RBC 2.99 (L) 4.60 - 6.20 M/uL    HGB 8.6 (L) 14.0 - 18.0 gm/dL    HCT 24.7 (L) 40.0 - 54.0 %    MCV 83 82 - 98 fL    MCH 28.8 27.0 - 31.0 pg    MCHC 34.8 32.0 - 36.0 g/dL    RDW 15.3 (H) 11.5 - 14.5 %    Platelet Count 196 150 - 450 K/uL    MPV 10.1 9.2 - 12.9 fL     Nucleated RBC 0 <=0 /100 WBC    Neut % 75.1 (H) 38 - 73 %    Lymph % 16.1 (L) 18 - 48 %    Mono % 7.8 4 - 15 %    Eos % 0.1 <=8 %    Basophil % 0.0 <=1.9 %    Imm Grans % 0.9 (H) 0.0 - 0.5 %    Neut # 5.54 1.8 - 7.7 K/uL    Lymph # 1.19 1 - 4.8 K/uL    Mono # 0.58 0.3 - 1 K/uL    Eos # 0.01 <=0.5 K/uL    Baso # 0.00 <=0.2 K/uL    Imm Grans # 0.07 (H) 0.00 - 0.04 K/uL   Anti-DNA Ab, Double-Stranded    Collection Time: 06/16/25  4:52 AM   Result Value Ref Range    dsDNA Ab Qual Negative 1:10 Negative 1:10    dsDNA Ab Titer     Renal Function Panel    Collection Time: 06/16/25  7:03 AM   Result Value Ref Range    Sodium 133 (L) 136 - 145 mmol/L    Potassium 3.8 3.5 - 5.1 mmol/L    Chloride 106 95 - 110 mmol/L    CO2 18 (L) 23 - 29 mmol/L    Glucose 76 70 - 110 mg/dL    BUN 74 (H) 6 - 20 mg/dL    Creatinine 3.1 (H) 0.5 - 1.4 mg/dL    Calcium 6.6 (LL) 8.7 - 10.5 mg/dL    Albumin 1.0 (L) 3.5 - 5.2 g/dL    Phosphorus Level 5.4 (H) 2.7 - 4.5 mg/dL    Anion Gap 9 8 - 16 mmol/L    eGFR 27 (L) >60 mL/min/1.73/m2   Phosphorus    Collection Time: 06/17/25  4:53 AM   Result Value Ref Range    Phosphorus Level 4.8 (H) 2.7 - 4.5 mg/dL   Magnesium    Collection Time: 06/17/25  4:53 AM   Result Value Ref Range    Magnesium  1.8 1.6 - 2.6 mg/dL   Comprehensive Metabolic Panel    Collection Time: 06/17/25  4:53 AM   Result Value Ref Range    Sodium 136 136 - 145 mmol/L    Potassium 3.7 3.5 - 5.1 mmol/L    Chloride 107 95 - 110 mmol/L    CO2 22 (L) 23 - 29 mmol/L    Glucose 74 70 - 110 mg/dL    BUN 73 (H) 6 - 20 mg/dL    Creatinine 2.4 (H) 0.5 - 1.4 mg/dL    Calcium 6.6 (LL) 8.7 - 10.5 mg/dL    Protein Total 4.2 (L) 6.0 - 8.4 gm/dL    Albumin 1.0 (L) 3.5 - 5.2 g/dL    Bilirubin Total 1.0 0.1 - 1.0 mg/dL    ALP 66 40 - 150 unit/L    AST 12 11 - 45 unit/L    ALT 6 (L) 10 - 44 unit/L    Anion Gap 7 (L) 8 - 16 mmol/L    eGFR 37 (L) >60 mL/min/1.73/m2   CBC with Differential    Collection Time: 06/17/25  4:53 AM   Result Value Ref Range     WBC 5.74 3.90 - 12.70 K/uL    RBC 2.57 (L) 4.60 - 6.20 M/uL    HGB 7.4 (L) 14.0 - 18.0 gm/dL    HCT 21.2 (L) 40.0 - 54.0 %    MCV 83 82 - 98 fL    MCH 28.8 27.0 - 31.0 pg    MCHC 34.9 32.0 - 36.0 g/dL    RDW 15.3 (H) 11.5 - 14.5 %    Platelet Count 176 150 - 450 K/uL    MPV 10.1 9.2 - 12.9 fL    Nucleated RBC 0 <=0 /100 WBC    Neut % 69.7 38 - 73 %    Lymph % 18.8 18 - 48 %    Mono % 10.1 4 - 15 %    Eos % 0.0 <=8 %    Basophil % 0.2 <=1.9 %    Imm Grans % 1.2 (H) 0.0 - 0.5 %    Neut # 4.00 1.8 - 7.7 K/uL    Lymph # 1.08 1 - 4.8 K/uL    Mono # 0.58 0.3 - 1 K/uL    Eos # 0.00 <=0.5 K/uL    Baso # 0.01 <=0.2 K/uL    Imm Grans # 0.07 (H) 0.00 - 0.04 K/uL             Significant Imaging:   US Retroperitoneal Complete   Final Result      1.  Normal bilateral renal ultrasound.  Negative for hydronephrosis.  Echogenicity of the kidneys is normal.   2.  Small right pleural effusion.         Finalized on: 6/15/2025 1:03 PM By:  Daryl Rivera MD   [facility]# [id number]      2025-06-15 13:05:15.238     [facility]      X-Ray Chest AP Portable   Final Result    No acute findings.      Finalized on: 6/12/2025 9:10 PM By:  Santo Sanchez MD   [facility]# [id number]      2025-06-12 21:12:11.879     [facility]          Significant Cardiac Procedures      Results for orders placed or performed during the hospital encounter of 06/12/25   EKG 12-lead    Collection Time: 06/12/25  8:53 PM   Result Value Ref Range    QRS Duration 88 ms    [facility] QTC Calculation 404 ms    Narrative    Test Reason : R06.02,    Vent. Rate :  88 BPM     Atrial Rate :  88 BPM     P-R Int : 164 ms          QRS Dur :  88 ms      QT Int : 334 ms       P-R-T Axes :  76  84  51 degrees    QTcB Int : 404 ms    Normal sinus rhythm  Normal ECG  No previous ECGs available  Confirmed by Hemant Massey ([id number]) on 6/14/2025 6:24:51 AM    Referred By: NANIREFERRAL SELF           Confirmed By: Hemant Massey         Pending Diagnostic Studies:       None           Medications:  Reconciled Home Medications:   Transferred to [facility] for further evaluation prior to final discharge      Indwelling Lines/Drains at time of discharge:   Lines/Drains/Airways       None                             Time spent on the discharge of patient: 45 minutes         Christopher Campbell DO  Department of Hospital Medicine  '[facility] Telemetry ([facility])    Voice recognition software was used in the creation of this note/communication and any sound-alike/typographical errors which may have occurred despite initial review prior to signing should be taken in context when interpreting.  If such errors prevent a clear understanding of the note/communication, please contact the provider/office for clarification.

## 2025-06-23 ENCOUNTER — TELEPHONE (OUTPATIENT)
Dept: RHEUMATOLOGY | Facility: CLINIC | Age: 25
End: 2025-06-23
Payer: MEDICAID

## 2025-06-23 NOTE — PLAN OF CARE
[facility] - Med Surg  Discharge Final Note    Primary Care Provider: Elaina, Primary Doctor    Expected Discharge Date: 6/20/2025    Pt discharged home with no needs.  Pt's family provided transportation home.     Discharge Plan A and Plan B have been determined by review of patient's clinical status, future medical and therapeutic needs, and coverage/benefits for post-acute care in coordination with multidisciplinary team members.      Final Discharge Note (most recent)       Final Note - 06/23/25 0750          Final Note    Assessment Type Final Discharge Note     Anticipated Discharge Disposition Home or Self Care        Post-Acute Status    Post-Acute Authorization Other     Other Status No Post-Acute Service Needs     Discharge Delays None known at this time                     Important Message from Medicare Sandra Encalade, LMSW  Part-Time-  [facility]  Ext. [phone]

## 2025-06-23 NOTE — TELEPHONE ENCOUNTER
Spoke to patient's outpatient Rheumatologist, Dr. Schaeffer. Discussed inpatient course and discharge plan. He has a follow up appt scheduled for July.